# Patient Record
Sex: FEMALE | Race: WHITE | Employment: FULL TIME | URBAN - METROPOLITAN AREA
[De-identification: names, ages, dates, MRNs, and addresses within clinical notes are randomized per-mention and may not be internally consistent; named-entity substitution may affect disease eponyms.]

---

## 2017-01-05 ENCOUNTER — OFFICE VISIT (OUTPATIENT)
Dept: FAMILY MEDICINE | Facility: OTHER | Age: 57
End: 2017-01-05
Attending: FAMILY MEDICINE
Payer: MEDICARE

## 2017-01-05 VITALS
DIASTOLIC BLOOD PRESSURE: 68 MMHG | HEART RATE: 74 BPM | OXYGEN SATURATION: 93 % | WEIGHT: 285 LBS | RESPIRATION RATE: 16 BRPM | BODY MASS INDEX: 52.44 KG/M2 | SYSTOLIC BLOOD PRESSURE: 108 MMHG | HEIGHT: 62 IN | TEMPERATURE: 97.3 F

## 2017-01-05 DIAGNOSIS — Z11.59 NEED FOR HEPATITIS C SCREENING TEST: ICD-10-CM

## 2017-01-05 DIAGNOSIS — I10 BENIGN ESSENTIAL HYPERTENSION: Primary | ICD-10-CM

## 2017-01-05 DIAGNOSIS — Z12.31 ENCOUNTER FOR SCREENING MAMMOGRAM FOR BREAST CANCER: ICD-10-CM

## 2017-01-05 DIAGNOSIS — E78.5 HYPERLIPIDEMIA, UNSPECIFIED HYPERLIPIDEMIA TYPE: ICD-10-CM

## 2017-01-05 DIAGNOSIS — E66.01 MORBID OBESITY, UNSPECIFIED OBESITY TYPE (H): ICD-10-CM

## 2017-01-05 LAB
ALBUMIN SERPL-MCNC: 3.5 G/DL (ref 3.4–5)
ALP SERPL-CCNC: 134 U/L (ref 40–150)
ALT SERPL W P-5'-P-CCNC: 25 U/L (ref 0–50)
ANION GAP SERPL CALCULATED.3IONS-SCNC: 9 MMOL/L (ref 3–14)
AST SERPL W P-5'-P-CCNC: 17 U/L (ref 0–45)
BASOPHILS # BLD AUTO: 0 10E9/L (ref 0–0.2)
BASOPHILS NFR BLD AUTO: 0.3 %
BILIRUB SERPL-MCNC: 0.2 MG/DL (ref 0.2–1.3)
BUN SERPL-MCNC: 21 MG/DL (ref 7–30)
CALCIUM SERPL-MCNC: 8.5 MG/DL (ref 8.5–10.1)
CHLORIDE SERPL-SCNC: 107 MMOL/L (ref 94–109)
CHOLEST SERPL-MCNC: 164 MG/DL
CO2 SERPL-SCNC: 27 MMOL/L (ref 20–32)
CREAT SERPL-MCNC: 0.78 MG/DL (ref 0.52–1.04)
DIFFERENTIAL METHOD BLD: NORMAL
EOSINOPHIL # BLD AUTO: 0.3 10E9/L (ref 0–0.7)
EOSINOPHIL NFR BLD AUTO: 3 %
ERYTHROCYTE [DISTWIDTH] IN BLOOD BY AUTOMATED COUNT: 14.9 % (ref 10–15)
GFR SERPL CREATININE-BSD FRML MDRD: 76 ML/MIN/1.7M2
GLUCOSE SERPL-MCNC: 91 MG/DL (ref 70–99)
HCT VFR BLD AUTO: 38.9 % (ref 35–47)
HDLC SERPL-MCNC: 64 MG/DL
HGB BLD-MCNC: 12.5 G/DL (ref 11.7–15.7)
LDLC SERPL CALC-MCNC: 74 MG/DL
LYMPHOCYTES # BLD AUTO: 2.1 10E9/L (ref 0.8–5.3)
LYMPHOCYTES NFR BLD AUTO: 23.7 %
MCH RBC QN AUTO: 28.4 PG (ref 26.5–33)
MCHC RBC AUTO-ENTMCNC: 32.1 G/DL (ref 31.5–36.5)
MCV RBC AUTO: 88 FL (ref 78–100)
MONOCYTES # BLD AUTO: 0.6 10E9/L (ref 0–1.3)
MONOCYTES NFR BLD AUTO: 6.7 %
NEUTROPHILS # BLD AUTO: 5.8 10E9/L (ref 1.6–8.3)
NEUTROPHILS NFR BLD AUTO: 66.3 %
NONHDLC SERPL-MCNC: 100 MG/DL
PLATELET # BLD AUTO: 239 10E9/L (ref 150–450)
POTASSIUM SERPL-SCNC: 3.8 MMOL/L (ref 3.4–5.3)
PROT SERPL-MCNC: 7.5 G/DL (ref 6.8–8.8)
RBC # BLD AUTO: 4.4 10E12/L (ref 3.8–5.2)
SODIUM SERPL-SCNC: 143 MMOL/L (ref 133–144)
TRIGL SERPL-MCNC: 130 MG/DL
WBC # BLD AUTO: 8.7 10E9/L (ref 4–11)

## 2017-01-05 PROCEDURE — 80053 COMPREHEN METABOLIC PANEL: CPT | Performed by: FAMILY MEDICINE

## 2017-01-05 PROCEDURE — 86803 HEPATITIS C AB TEST: CPT | Performed by: FAMILY MEDICINE

## 2017-01-05 PROCEDURE — 85025 COMPLETE CBC W/AUTO DIFF WBC: CPT | Performed by: FAMILY MEDICINE

## 2017-01-05 PROCEDURE — 80061 LIPID PANEL: CPT | Performed by: FAMILY MEDICINE

## 2017-01-05 PROCEDURE — 99214 OFFICE O/P EST MOD 30 MIN: CPT | Performed by: FAMILY MEDICINE

## 2017-01-05 PROCEDURE — 36415 COLL VENOUS BLD VENIPUNCTURE: CPT | Performed by: FAMILY MEDICINE

## 2017-01-05 PROCEDURE — 99213 OFFICE O/P EST LOW 20 MIN: CPT

## 2017-01-05 RX ORDER — FUROSEMIDE 20 MG
40 TABLET ORAL DAILY
COMMUNITY
End: 2017-11-20

## 2017-01-05 ASSESSMENT — ANXIETY QUESTIONNAIRES
1. FEELING NERVOUS, ANXIOUS, OR ON EDGE: NOT AT ALL
5. BEING SO RESTLESS THAT IT IS HARD TO SIT STILL: NOT AT ALL
2. NOT BEING ABLE TO STOP OR CONTROL WORRYING: NOT AT ALL
7. FEELING AFRAID AS IF SOMETHING AWFUL MIGHT HAPPEN: NOT AT ALL
GAD7 TOTAL SCORE: 0
IF YOU CHECKED OFF ANY PROBLEMS ON THIS QUESTIONNAIRE, HOW DIFFICULT HAVE THESE PROBLEMS MADE IT FOR YOU TO DO YOUR WORK, TAKE CARE OF THINGS AT HOME, OR GET ALONG WITH OTHER PEOPLE: NOT DIFFICULT AT ALL
6. BECOMING EASILY ANNOYED OR IRRITABLE: NOT AT ALL
3. WORRYING TOO MUCH ABOUT DIFFERENT THINGS: NOT AT ALL

## 2017-01-05 ASSESSMENT — PAIN SCALES - GENERAL: PAINLEVEL: NO PAIN (0)

## 2017-01-05 ASSESSMENT — PATIENT HEALTH QUESTIONNAIRE - PHQ9: 5. POOR APPETITE OR OVEREATING: NOT AT ALL

## 2017-01-05 NOTE — MR AVS SNAPSHOT
After Visit Summary   1/5/2017    Maite Suero    MRN: [id number]           Patient Information     Date Of Birth          1960        Visit Information        Provider Department      1/5/2017 10:30 AM Yessi Dorsey MD [facility]        Today's Diagnoses     Benign essential hypertension    -  1     Hyperlipidemia, unspecified hyperlipidemia type         Morbid obesity, unspecified obesity type (H)         Need for hepatitis C screening test         Encounter for screening mammogram for breast cancer            Follow-ups after your visit        Additional Services     GENERAL SURG ADULT REFERRAL       Your provider has referred you to: [facility]    Please be aware that coverage of these services is subject to the terms and limitations of your health insurance plan.  Call member services at your health plan with any benefit or coverage questions.      Please bring the following with you to your appointment:    (1) Any X-Rays, CTs or MRIs which have been performed.  Contact the facility where they were done to arrange for  prior to your scheduled appointment.   (2) List of current medications   (3) This referral request   (4) Any documents/labs given to you for this referral                  Follow-up notes from your care team     Return in about 1 year (around 1/5/2018).      Your next 10 appointments already scheduled     Oct 17, 2017  1:00 PM   (Arrive by 12:45 PM)   Return Visit with Darling Osuna NP   [facility] ([facility])    [address] [place] [address]   [phone]              Who to contact     If you have questions or need follow up information about today's clinic visit or your schedule please contact [facility] directly at [phone].  Normal or non-critical lab and imaging results will be communicated to you by MyChart, letter or phone within 4 business days after the clinic has  "received the results. If you do not hear from us within 7 days, please contact the clinic through [employer] or phone. If you have a critical or abnormal lab result, we will notify you by phone as soon as possible.  Submit refill requests through [employer] or call your pharmacy and they will forward the refill request to us. Please allow 3 business days for your refill to be completed.          Additional Information About Your Visit        [employer] Information     [employer] lets you send messages to your doctor, view your test results, renew your prescriptions, schedule appointments and more. To sign up, go to [email or web address] . Click on \"Log in\" on the left side of the screen, which will take you to the Welcome page. Then click on \"Sign up Now\" on the right side of the page.     You will be asked to enter the access code listed below, as well as some personal information. Please follow the directions to create your username and password.     Your access code is: [email or web address]  Expires: 2017 12:22 PM     Your access code will  in 90 days. If you need help or a new code, please call your [facility] clinic or [phone].        Care EveryWhere ID     This is your Care EveryWhere ID. This could be used by other organizations to access your [facility] medical records  [id number]        Your Vitals Were     Pulse Temperature Respirations Height BMI (Body Mass Index) Pulse Oximetry    74 97.3  F (36.3  C) (Tympanic) 16 5' 2\" (1.575 m) 52.11 kg/m2 93%       Blood Pressure from Last 3 Encounters:   17 108/68   10/18/16 110/62   16 104/62    Weight from Last 3 Encounters:   17 285 lb (129.275 kg)   10/18/16 285 lb (129.275 kg)   16 281 lb (127.461 kg)              We Performed the Following     CBC with platelets and differential     Comprehensive metabolic panel     GENERAL SURG ADULT REFERRAL     Hepatitis C Screen Reflex to HCV RNA Quant and Genotype     Lipid Profile (Chol, Trig, HDL, " LDL calc)     MA Digital Screening Bilateral          Today's Medication Changes          These changes are accurate as of: 1/5/17  6:27 PM.  If you have any questions, ask your nurse or doctor.               These medicines have changed or have updated prescriptions.        Dose/Directions    LASIX 20 MG tablet   This may have changed:  Another medication with the same name was removed. Continue taking this medication, and follow the directions you see here.   Generic drug:  furosemide   Changed by:  Yessi Dorsey MD        Dose:  40 mg   Take 40 mg by mouth daily   Refills:  0                Primary Care Provider Office Phone # Fax #    Yessi Dorsey [phone]       [facility] [address] [place] [id number]        Thank you!     Thank you for choosing [facility]  for your care. Our goal is always to provide you with excellent care. Hearing back from our patients is one way we can continue to improve our services. Please take a few minutes to complete the written survey that you may receive in the mail after your visit with us. Thank you!             Your Updated Medication List - Protect others around you: Learn how to safely use, store and throw away your medicines at www.disposemymeds.org.          This list is accurate as of: 1/5/17  6:27 PM.  Always use your most recent med list.                   Brand Name Dispense Instructions for use    ABILIFY PO      Take 10 mg by mouth daily       aspirin 81 MG EC tablet   Generic drug:  aspirin      Take 81 mg by mouth 2 times daily       buPROPion 300 MG 24 hr tablet    WELLBUTRIN XL     Take by mouth every morning       COLACE PO      Take 100 mg by mouth daily       CRESTOR 20 MG tablet   Generic drug:  rosuvastatin     90 tablet    TAKE ONE TABLET BY MOUTH ONCE DAILY       LASIX 20 MG tablet   Generic drug:  furosemide      Take 40 mg by mouth daily       mirabegron 50 MG 24 hr tablet     MYRBETRIQ    30 tablet    Take 1 tablet (50 mg) by mouth daily       MULTIVITAMIN PO      Take 0.5 mg by mouth 2 times daily       NASONEX 50 MCG/ACT spray   Generic drug:  mometasone     17 g    USE TWO SPRAYS IN EACH NOSTRIL ONCE DAILY       omeprazole 20 MG CR capsule    priLOSEC    60 capsule    TAKE ONE CAPSULE BY MOUTH TWICE DAILY       PROBIOTIC DAILY Caps      Take by mouth daily       RITALIN LA 20 MG Cp24   Generic drug:  methylphenidate      Take 80 mg by mouth daily       ROZEREM 8 MG tablet   Generic drug:  ramelteon     30 tablet    TAKE ONE TABLET BY MOUTH AT BEDTIME       sertraline 100 MG tablet    ZOLOFT     Take 2 tablets by mouth daily

## 2017-01-05 NOTE — NURSING NOTE
"Chief Complaint   Patient presents with     Physical     needs mammogram ordered and breast exam today, has an order from DR LARSON for lab work     Recheck Medication     refills       Initial /68 mmHg  Pulse 74  Temp(Src) 97.3  F (36.3  C) (Tympanic)  Resp 16  Ht 5' 2\" (1.575 m)  Wt 285 lb (129.275 kg)  BMI 52.11 kg/m2  SpO2 93% Estimated body mass index is 52.11 kg/(m^2) as calculated from the following:    Height as of this encounter: 5' 2\" (1.575 m).    Weight as of this encounter: 285 lb (129.275 kg).  BP completed using cuff size: catarina Fletcher      "

## 2017-01-05 NOTE — Clinical Note
[facility]  [address]  [place] [address]  [phone]    January 10, 2017    Maite Suero  [address] [place] [address] [place] [id number]    Dear Maite,    Normal/negative results.     Results for orders placed or performed in visit on 01/05/17   Lipid Profile (Chol, Trig, HDL, LDL calc)   Result Value Ref Range    Cholesterol 164 <200 mg/dL    Triglycerides 130 <150 mg/dL    HDL Cholesterol 64 >49 mg/dL    LDL Cholesterol Calculated 74 <100 mg/dL    Non HDL Cholesterol 100 <130 mg/dL   Comprehensive metabolic panel   Result Value Ref Range    Sodium 143 133 - 144 mmol/L    Potassium 3.8 3.4 - 5.3 mmol/L    Chloride 107 94 - 109 mmol/L    Carbon Dioxide 27 20 - 32 mmol/L    Anion Gap 9 3 - 14 mmol/L    Glucose 91 70 - 99 mg/dL    Urea Nitrogen 21 7 - 30 mg/dL    Creatinine 0.78 0.52 - 1.04 mg/dL    GFR Estimate 76 >60 mL/min/1.7m2    GFR Estimate If Black >90   GFR Calc   >60 mL/min/1.7m2    Calcium 8.5 8.5 - 10.1 mg/dL    Bilirubin Total 0.2 0.2 - 1.3 mg/dL    Albumin 3.5 3.4 - 5.0 g/dL    Protein Total 7.5 6.8 - 8.8 g/dL    Alkaline Phosphatase 134 40 - 150 U/L    ALT 25 0 - 50 U/L    AST 17 0 - 45 U/L   CBC with platelets and differential   Result Value Ref Range    WBC 8.7 4.0 - 11.0 10e9/L    RBC Count 4.40 3.8 - 5.2 10e12/L    Hemoglobin 12.5 11.7 - 15.7 g/dL    Hematocrit 38.9 35.0 - 47.0 %    MCV 88 78 - 100 fl    MCH 28.4 26.5 - 33.0 pg    MCHC 32.1 31.5 - 36.5 g/dL    RDW 14.9 10.0 - 15.0 %    Platelet Count 239 150 - 450 10e9/L    Diff Method Automated Method     % Neutrophils 66.3 %    % Lymphocytes 23.7 %    % Monocytes 6.7 %    % Eosinophils 3.0 %    % Basophils 0.3 %    Absolute Neutrophil 5.8 1.6 - 8.3 10e9/L    Absolute Lymphocytes 2.1 0.8 - 5.3 10e9/L    Absolute Monocytes 0.6 0.0 - 1.3 10e9/L    Absolute Eosinophils 0.3 0.0 - 0.7 10e9/L    Absolute Basophils 0.0 0.0 - 0.2 10e9/L   Hepatitis C Screen Reflex to HCV RNA Quant and Genotype    Result Value Ref Range    Hepatitis C Antibody  NR     Nonreactive   Assay performance characteristics have not been established for newborns,   infants, and children         Sincerely,  DR FORBES

## 2017-01-06 ASSESSMENT — ANXIETY QUESTIONNAIRES: GAD7 TOTAL SCORE: 0

## 2017-01-06 ASSESSMENT — PATIENT HEALTH QUESTIONNAIRE - PHQ9: SUM OF ALL RESPONSES TO PHQ QUESTIONS 1-9: 7

## 2017-01-06 NOTE — PROGRESS NOTES
SUBJECTIVE:                                                    Maite Suero is a 56 year old female who presents to clinic today for the following health issues:    Hyperlipidemia Follow-Up      Rate your low fat/cholesterol diet?: good    Taking statin?  Yes, no muscle aches from statin    Other lipid medications/supplements?:  none     Hypertension Follow-up      Outpatient blood pressures are not being checked.    Low Salt Diet: no added salt       Amount of exercise or physical activity: trying to be active daily    Problems taking medications regularly: No    Medication side effects: none    Diet: low fat/cholesterol    Patient states she is frustrated with her weight.  She has tried to do all the right things.  She is ready to meet with Bariatrics.  She is also due for hepatitis C screening.  She is due for mammogram.    Problem list and histories reviewed & adjusted, as indicated.  Additional history: as documented    Patient Active Problem List   Diagnosis     Benign essential hypertension     Hyperlipidemia     Major depressive disorder, recurrent episode (H)     Ankle sprain     Weight gain     GERD (gastroesophageal reflux disease)     Low back pain     Cervicalgia (NECK)     Urinary, incontinence, stress female     Irritable bowel syndrome     Morbid obesity (H)     Dissociative identity disorder     Vitamin D deficiency     OAB (overactive bladder)     ACP (advance care planning)     Mixed stress and urge urinary incontinence     Past Surgical History   Procedure Laterality Date     Hysterectomy       fibroids     Gyn surgery       Cholecystectomy       Colonoscopy N/A 10/9/2014     Procedure: COLONOSCOPY;  Surgeon: Vandana Daniels MD;  Location: [facility] OR       Social History   Substance Use Topics     Smoking status: Never Smoker      Smokeless tobacco: Never Used     Alcohol Use: No     Family History   Problem Relation Age of Onset     Psychotic Disorder Mother      Psychotic Disorder Father   "    Psychotic Disorder Brother      Psychotic Disorder Sister          Current Outpatient Prescriptions   Medication Sig Dispense Refill     furosemide (LASIX) 20 MG tablet Take 40 mg by mouth daily       Docusate Sodium (COLACE PO) Take 100 mg by mouth daily       CRESTOR 20 MG tablet TAKE ONE TABLET BY MOUTH ONCE DAILY 90 tablet 0     NASONEX 50 MCG/ACT nasal spray USE TWO SPRAYS IN EACH NOSTRIL ONCE DAILY 17 g 5     mirabegron (MYRBETRIQ) 50 MG 24 hr tablet Take 1 tablet (50 mg) by mouth daily 30 tablet 11     methylphenidate (RITALIN LA) 20 MG CP24 Take 80 mg by mouth daily       omeprazole (PRILOSEC) 20 MG capsule TAKE ONE CAPSULE BY MOUTH TWICE DAILY 60 capsule 10     ROZEREM 8 MG tablet TAKE ONE TABLET BY MOUTH AT BEDTIME 30 tablet 5     Multiple Vitamins-Minerals (MULTIVITAMIN PO) Take 0.5 mg by mouth 2 times daily       ARIPiprazole (ABILIFY PO) Take 10 mg by mouth daily       aspirin (ASPIRIN) 81 MG EC tablet Take 81 mg by mouth 2 times daily        buPROPion (WELLBUTRIN XL) 300 MG 24 hr tablet Take by mouth every morning       sertraline (ZOLOFT) 100 MG tablet Take 2 tablets by mouth daily       Probiotic Product (PROBIOTIC DAILY) CAPS Take by mouth daily       Allergies   Allergen Reactions     Ambien [Zolpidem] Other (See Comments)     Pt states she was baking food in her sleep     Ivp Dye [Contrast Dye]      From 25 years ago, may be formulated differently now       ROS:  Constitutional, HEENT, cardiovascular, pulmonary, GI, , musculoskeletal, neuro, skin, endocrine and psych systems are negative, except as otherwise noted.    OBJECTIVE:                                                    /68 mmHg  Pulse 74  Temp(Src) 97.3  F (36.3  C) (Tympanic)  Resp 16  Ht 5' 2\" (1.575 m)  Wt 285 lb (129.275 kg)  BMI 52.11 kg/m2  SpO2 93%  Body mass index is 52.11 kg/(m^2).  GENERAL: healthy, alert and no distress  EYES: Eyes grossly normal to inspection, PERRL and conjunctivae and sclerae " normal  HENT: ear canals and TM's normal, nose and mouth without ulcers or lesions  NECK: no adenopathy  RESP: lungs clear to auscultation - no rales, rhonchi or wheezes  BREAST: normal without masses, tenderness or nipple discharge and no palpable axillary masses or adenopathy  CV: regular rate and rhythm, normal S1 S2, no S3 or S4, no murmur, click or rub, no peripheral edema and peripheral pulses strong  ABDOMEN: soft, nontender, no hepatosplenomegaly, no masses and bowel sounds normal  MS: no gross musculoskeletal defects noted, no edema  SKIN: no suspicious lesions or rashes  NEURO: Normal strength and tone, mentation intact and speech normal  PSYCH: mentation appears normal, affect normal/bright    Diagnostic Test Results:  none      ASSESSMENT/PLAN:                                                    Hyperlipidemia; controlled   Plan:  Labs:   Lipid and ALT    Hypertension; controlled   Associated with the following complications:    None   Plan:  Labs:   BMP        ICD-10-CM    1. Benign essential hypertension I10 Comprehensive metabolic panel     CBC with platelets and differential   2. Hyperlipidemia, unspecified hyperlipidemia type E78.5 Lipid Profile (Chol, Trig, HDL, LDL calc)     Comprehensive metabolic panel   3. Morbid obesity, unspecified obesity type (H) E66.01 GENERAL SURG ADULT REFERRAL   4. Need for hepatitis C screening test Z11.59 Hepatitis C Screen Reflex to HCV RNA Quant and Genotype   5. Encounter for screening mammogram for breast cancer Z12.31 MA Digital Screening Bilateral       CONSULTATION/REFERRAL to Bariatric Surgery at [facility].      Yessi Dorsey MD  [facility]

## 2017-01-09 LAB — HCV AB SERPL QL IA: NORMAL

## 2017-01-10 ENCOUNTER — TELEPHONE (OUTPATIENT)
Dept: FAMILY MEDICINE | Facility: OTHER | Age: 57
End: 2017-01-10

## 2017-01-10 DIAGNOSIS — S39.012A BACK STRAIN, INITIAL ENCOUNTER: Primary | ICD-10-CM

## 2017-02-01 DIAGNOSIS — E78.2 MIXED HYPERLIPIDEMIA: Primary | ICD-10-CM

## 2017-02-02 RX ORDER — ROSUVASTATIN CALCIUM 20 MG/1
TABLET, FILM COATED ORAL
Qty: 90 TABLET | Refills: 3 | Status: SHIPPED | OUTPATIENT
Start: 2017-02-02 | End: 2018-04-30

## 2017-02-02 NOTE — TELEPHONE ENCOUNTER
Crestor 20 mg    Last Written Prescription Date: 1-4-2017  Last Fill Quantity: 90, # refills: 0  Last Office Visit with [facility], P or [facility] prescribing provider: 1-5-2017       CHOL      164   1/5/2017  HDL       64   1/5/2017  LDL       74   1/5/2017  TRIG      130   1/5/2017  CHOLHDLRATIO      2.8   11/11/2015

## 2017-03-11 DIAGNOSIS — G47.00 INSOMNIA, UNSPECIFIED TYPE: Primary | ICD-10-CM

## 2017-03-11 DIAGNOSIS — G47.00 INSOMNIA: ICD-10-CM

## 2017-03-13 RX ORDER — RAMELTEON 8 MG/1
TABLET, FILM COATED ORAL
Qty: 30 TABLET | Refills: 5 | Status: SHIPPED | OUTPATIENT
Start: 2017-03-13 | End: 2017-11-28

## 2017-03-27 ENCOUNTER — HOSPITAL ENCOUNTER (EMERGENCY)
Facility: HOSPITAL | Age: 57
Discharge: HOME | End: 2017-03-27
Attending: EMERGENCY MEDICINE | Admitting: EMERGENCY MEDICINE
Payer: MEDICARE

## 2017-03-27 ENCOUNTER — TELEPHONE (OUTPATIENT)
Dept: FAMILY MEDICINE | Facility: OTHER | Age: 57
End: 2017-03-27

## 2017-03-27 VITALS
TEMPERATURE: 97.9 F | SYSTOLIC BLOOD PRESSURE: 119 MMHG | RESPIRATION RATE: 18 BRPM | OXYGEN SATURATION: 93 % | DIASTOLIC BLOOD PRESSURE: 69 MMHG | HEART RATE: 84 BPM

## 2017-03-27 DIAGNOSIS — R42 VERTIGO: ICD-10-CM

## 2017-03-27 PROCEDURE — 25000125 ZZHC RX 250: Performed by: EMERGENCY MEDICINE

## 2017-03-27 PROCEDURE — 99283 EMERGENCY DEPT VISIT LOW MDM: CPT

## 2017-03-27 PROCEDURE — 99283 EMERGENCY DEPT VISIT LOW MDM: CPT | Performed by: EMERGENCY MEDICINE

## 2017-03-27 PROCEDURE — 25900017 H RX MED GY IP 259 OP 259 PS 637: Mod: GY | Performed by: EMERGENCY MEDICINE

## 2017-03-27 PROCEDURE — A9270 NON-COVERED ITEM OR SERVICE: HCPCS | Mod: GY | Performed by: EMERGENCY MEDICINE

## 2017-03-27 RX ORDER — SCOLOPAMINE TRANSDERMAL SYSTEM 1 MG/1
PATCH, EXTENDED RELEASE TRANSDERMAL
Qty: 3 PATCH | Refills: 0 | Status: SHIPPED | OUTPATIENT
Start: 2017-03-27 | End: 2017-08-28

## 2017-03-27 RX ORDER — SCOLOPAMINE TRANSDERMAL SYSTEM 1 MG/1
1 PATCH, EXTENDED RELEASE TRANSDERMAL
Status: DISCONTINUED | OUTPATIENT
Start: 2017-03-27 | End: 2017-03-27 | Stop reason: HOSPADM

## 2017-03-27 RX ORDER — MECLIZINE HYDROCHLORIDE 25 MG/1
25 TABLET ORAL ONCE
Status: COMPLETED | OUTPATIENT
Start: 2017-03-27 | End: 2017-03-27

## 2017-03-27 RX ORDER — MECLIZINE HYDROCHLORIDE 25 MG/1
25 TABLET ORAL EVERY 6 HOURS PRN
Qty: 30 TABLET | Refills: 1 | Status: SHIPPED | OUTPATIENT
Start: 2017-03-27 | End: 2017-08-28

## 2017-03-27 RX ADMIN — SCOPALAMINE 1 PATCH: 1 PATCH, EXTENDED RELEASE TRANSDERMAL at 17:32

## 2017-03-27 RX ADMIN — MECLIZINE HYDROCHLORIDE 25 MG: 25 TABLET ORAL at 17:33

## 2017-03-27 ASSESSMENT — ENCOUNTER SYMPTOMS
FATIGUE: 1
ACTIVITY CHANGE: 1
APPETITE CHANGE: 1
WEAKNESS: 1
HEADACHES: 0
DIZZINESS: 1
LIGHT-HEADEDNESS: 1

## 2017-03-27 NOTE — TELEPHONE ENCOUNTER
12:21 PM    Reason for Call: OVERBOOK    Patient is having the following symptoms:  Extreme dizziness for  4 days    The patient is requesting an appointment for ASAP  with  Dr. Morin    Was an appointment offered for this call?  No    Preferred method for responding to this message: [phone]    If we cannot reach you directly, may we leave a detailed response at the number you provided?  Yes        Deysi Macias

## 2017-03-27 NOTE — ED PROVIDER NOTES
History     Chief Complaint   Patient presents with     Dizziness     Started a little on Friday night, had a little diarrhea on Saturday but no dizziness, then dizziness came back late Saturday night and Sunday. States was severe on Sunday but is actually better now.  has a history of these symptoms but it has been at least a year.  has taken meclizine in the past.      HPI  Maite Suero is a 56 year old female who presents with these symptoms.  She has had vertigo in the past and did not know that she could get meclizine OTC.  Since she could not get into see Dr. Campo today, she presents in our ER.  No headache or any neuro symptoms above the vertigo and nausea.     I have reviewed the Medications, Allergies, Past Medical and Surgical History, and Social History in the Epic system.    Review of Systems   Constitutional: Positive for activity change, appetite change and fatigue.   Neurological: Positive for dizziness, weakness and light-headedness. Negative for headaches.       Physical Exam   BP: 133/77  Temp: 97.9  F (36.6  C)  Resp: 18  SpO2: 94 %  Physical Exam   Constitutional: She is oriented to person, place, and time. She appears well-developed and well-nourished.   HENT:   Head: Normocephalic and atraumatic.   Eyes: Conjunctivae and EOM are normal. Pupils are equal, round, and reactive to light.   No nystagmus   Neck: Normal range of motion. Neck supple.   Cardiovascular: Normal rate.    Pulmonary/Chest: Effort normal.   Abdominal: Soft. She exhibits no distension. There is no tenderness. There is no rebound.   Musculoskeletal: Normal range of motion. She exhibits no edema.   Neurological: She is alert and oriented to person, place, and time.   Skin: Skin is warm.     ED Course     ED Course     Procedures  Critical Care time:  none    Assessments & Plan (with Medical Decision Making)   Maite has recurrent vertigo with nausea and requests only antivert since she was through this  same situation a few years prior.  To that end, a scoploamine patch was applied and she received antivert 25mg and was observed and felt better.  Will f/u with her FP [facility] if this does not help.   I have reviewed the nursing notes.    I have reviewed the findings, diagnosis, plan and need for follow up with the patient.    New Prescriptions    MECLIZINE (ANTIVERT) 25 MG TABLET    Take 1 tablet (25 mg) by mouth every 6 hours as needed for dizziness    SCOPOLAMINE (TRANSDERM) 72 HR PATCH    Apply 1 patch to hairless area behind one ear at least 4 hours before travel.  Remove old patch and change every 3 days (72 hours).       Final diagnoses:   Vertigo       3/27/2017   [facility]     Franky Louis MD  03/27/17 2126

## 2017-03-27 NOTE — ED AVS SNAPSHOT
[facility]    [address]    [place] [address]    Phone:  [phone]                                       Maite uSero   MRN: [id number]    Department:  [facility]   Date of Visit:  3/27/2017           After Visit Summary Signature Page     I have received my discharge instructions, and my questions have been answered. I have discussed any challenges I see with this plan with the nurse or doctor.    ..........................................................................................................................................  Patient/Patient Representative Signature      ..........................................................................................................................................  Patient Representative Print Name and Relationship to Patient    ..................................................               ................................................  Date                                            Time    ..........................................................................................................................................  Reviewed by Signature/Title    ...................................................              ..............................................  Date                                                            Time

## 2017-03-27 NOTE — DISCHARGE INSTRUCTIONS
Managing Dizziness (Vertigo) with Medications  Although medications can't cure your problem, they can help control symptoms. Your doctor may prescribe medications for a few weeks and then taper them off.  If you have side effects from your medications, contact your healthcare provider right away.   How medications can help      Treat infection or inflammation. If you have a bacterial infection, your doctor can prescribe antibiotics.    Limit conflicting balance signals. These medications are often in pill form.    Ease nausea. Suppositories, pills, or shots may be used to reduce vomiting.    Reduce pressure in the canals. Diuretics can be used to treat Meniere's disease. These medications help rid the body of excess fluid.    Ease other symptoms. Other medications can help ease depression and anxiety caused by living with dizziness or fainting.    2865-3240 [employer] [address] [place] [address], [place] [address]. All rights reserved. This information is not intended as a substitute for professional medical care. Always follow your healthcare professional's instructions.          Understanding Dizziness, Balance Problems, and Fainting  Balance is a group effort of the eyes, inner ear, joints, and muscles. They each send signals to the brain about body position and head movement. Then the brain uses this information to achieve balance. When the brain receives conflicting signals, or when there is a problem with blood flow, dizziness or fainting can happen.    Vertigo  Vertigo is the feeling of spinning. It may happen if the brain receives conflicting balance signals. Vertigo is often caused by a problem in the inner ear. Problems include changes in inner ear structures, infection, swelling, or excess fluid. Sometimes vertigo is due to a brain problem, such as migraine.   Dysequilibrium  Dysequilibrium is the feeling of imbalance without a sense of spinning. It may happen if the signal path between the  body and brain is disrupted. There are many causes of dysequilibrium, including diabetes, anemia, head injury, and aging.  Syncope  Syncope is losing consciousness or fainting. The brain needs oxygen-rich blood to function. The heart pumps that blood to the brain. If there is a problem with the heart, blood flow (such as low blood pressure), or blood vessels, faintness may happen.    6405-8219 [employer]. [address] [place] [address], [place] [address]. All rights reserved. This information is not intended as a substitute for professional medical care. Always follow your healthcare professional's instructions.      Dyanaauluis,   You feel that this episode of vertigo is reminiscent of your previous episode and would simply like some meds.  The patch is replaced every three days as a preventative and the antivert can be used for breakthroughs.  If the symptoms have not cleared in 10 days then check in with Dr. Campo for further evaluation.

## 2017-03-27 NOTE — ED AVS SNAPSHOT
[facility]    [address] [address]    Phone:  [phone]                                       Maite Suero   MRN: [id number]    Department:  [facility]   Date of Visit:  3/27/2017           Patient Information     Date Of Birth          1960        Your diagnoses for this visit were:     Vertigo        You were seen by Franky Louis MD.      Follow-up Information     Follow up with Yessi Dorsey MD.    Specialty:  Family Practice    Why:  As needed    Contact information:    [facility]  [address] [address]  [phone]          Discharge Instructions         Managing Dizziness (Vertigo) with Medications  Although medications can't cure your problem, they can help control symptoms. Your doctor may prescribe medications for a few weeks and then taper them off.  If you have side effects from your medications, contact your healthcare provider right away.   How medications can help      Treat infection or inflammation. If you have a bacterial infection, your doctor can prescribe antibiotics.    Limit conflicting balance signals. These medications are often in pill form.    Ease nausea. Suppositories, pills, or shots may be used to reduce vomiting.    Reduce pressure in the canals. Diuretics can be used to treat Meniere's disease. These medications help rid the body of excess fluid.    Ease other symptoms. Other medications can help ease depression and anxiety caused by living with dizziness or fainting.    6243-4096 [employer]. [address] [place] [address]. All rights reserved. This information is not intended as a substitute for professional medical care. Always follow your healthcare professional's instructions.          Understanding Dizziness, Balance Problems, and Fainting  Balance is a group effort of the eyes, inner ear, joints, and muscles. They each send signals to the  brain about body position and head movement. Then the brain uses this information to achieve balance. When the brain receives conflicting signals, or when there is a problem with blood flow, dizziness or fainting can happen.    Vertigo  Vertigo is the feeling of spinning. It may happen if the brain receives conflicting balance signals. Vertigo is often caused by a problem in the inner ear. Problems include changes in inner ear structures, infection, swelling, or excess fluid. Sometimes vertigo is due to a brain problem, such as migraine.   Dysequilibrium  Dysequilibrium is the feeling of imbalance without a sense of spinning. It may happen if the signal path between the body and brain is disrupted. There are many causes of dysequilibrium, including diabetes, anemia, head injury, and aging.  Syncope  Syncope is losing consciousness or fainting. The brain needs oxygen-rich blood to function. The heart pumps that blood to the brain. If there is a problem with the heart, blood flow (such as low blood pressure), or blood vessels, faintness may happen.    0553-1233 [employer]. [address] [place] [address]. All rights reserved. This information is not intended as a substitute for professional medical care. Always follow your healthcare professional's instructions.      Melaura,   You feel that this episode of vertigo is reminiscent of your previous episode and would simply like some meds.  The patch is replaced every three days as a preventative and the antivert can be used for breakthroughs.  If the symptoms have not cleared in 10 days then check in with Dr. Campo for further evaluation.     Future Appointments        Provider Department Dept Phone Center    10/17/2017 1:00 PM Darling Ham NP [facility] [phone] Thelma Art         Review of your medicines      START taking        Dose / Directions Last dose taken    meclizine 25 MG tablet   Commonly known as:   ANTIVERT   Dose:  25 mg   Quantity:  30 tablet        Take 1 tablet (25 mg) by mouth every 6 hours as needed for dizziness   Refills:  1        scopolamine 72 hr patch   Commonly known as:  TRANSDERM   Quantity:  3 patch        Apply 1 patch to hairless area behind one ear at least 4 hours before travel.  Remove old patch and change every 3 days (72 hours).   Refills:  0          Our records show that you are taking the medicines listed below. If these are incorrect, please call your family doctor or clinic.        Dose / Directions Last dose taken    ABILIFY PO   Dose:  10 mg        Take 10 mg by mouth daily   Refills:  0        aspirin 81 MG EC tablet   Dose:  81 mg   Generic drug:  aspirin        Take 81 mg by mouth 2 times daily   Refills:  0        buPROPion 300 MG 24 hr tablet   Commonly known as:  WELLBUTRIN XL        Take by mouth every morning   Refills:  0        COLACE PO   Dose:  100 mg        Take 100 mg by mouth daily   Refills:  0        CRESTOR 20 MG tablet   Quantity:  90 tablet   Generic drug:  rosuvastatin        TAKE ONE TABLET BY MOUTH ONCE DAILY   Refills:  3        LASIX 20 MG tablet   Dose:  40 mg   Generic drug:  furosemide        Take 40 mg by mouth daily   Refills:  0        mirabegron 50 MG 24 hr tablet   Commonly known as:  MYRBETRIQ   Dose:  50 mg   Quantity:  30 tablet        Take 1 tablet (50 mg) by mouth daily   Refills:  11        MULTIVITAMIN PO   Dose:  0.5 mg        Take 0.5 mg by mouth 2 times daily   Refills:  0        NASONEX 50 MCG/ACT spray   Quantity:  17 g   Generic drug:  mometasone        USE TWO SPRAYS IN EACH NOSTRIL ONCE DAILY   Refills:  5        omeprazole 20 MG CR capsule   Commonly known as:  priLOSEC   Quantity:  60 capsule        TAKE ONE CAPSULE BY MOUTH TWICE DAILY   Refills:  10        PROBIOTIC DAILY Caps        Take by mouth daily   Refills:  0        RITALIN LA 20 MG Cp24   Dose:  80 mg   Generic drug:  methylphenidate        Take 80 mg by mouth daily  "  Refills:  0        ROZEREM 8 MG tablet   Quantity:  30 tablet   Generic drug:  ramelteon        TAKE ONE TABLET BY MOUTH AT BEDTIME   Refills:  5        sertraline 100 MG tablet   Commonly known as:  ZOLOFT   Dose:  2 tablet        Take 2 tablets by mouth daily   Refills:  0        tiZANidine 4 MG tablet   Commonly known as:  ZANAFLEX   Quantity:  30 tablet        TAKE ONE TABLET BY MOUTH THREE TIMES DAILY AS NEEDED   Refills:  0                Prescriptions were sent or printed at these locations (2 Prescriptions)                   [facility] [address] [place] [address] [address]    [address] [facility] [address]    Telephone:  [phone]   Fax:  [phone]   Hours:                  Printed at Department/Unit printer (2 of 2)         scopolamine (TRANSDERM) 72 hr patch               meclizine (ANTIVERT) 25 MG tablet                Orders Needing Specimen Collection     None      Pending Results     No orders found from 3/25/2017 to 3/28/2017.            Pending Culture Results     No orders found from 3/25/2017 to 3/28/2017.            Thank you for choosing [facility]       Thank you for choosing [facility] for your care. Our goal is always to provide you with excellent care. Hearing back from our patients is one way we can continue to improve our services. Please take a few minutes to complete the written survey that you may receive in the mail after you visit with us. Thank you!        [employer] Information     [employer] lets you send messages to your doctor, view your test results, renew your prescriptions, schedule appointments and more. To sign up, go to [email or web address] . Click on \"Log in\" on the left side of the screen, which will take you to the Welcome page. Then click on \"Sign up Now\" on the right side of the page.     You will be asked to enter the access code listed below, as well as some personal information. Please follow the directions to create your username and " password.     Your access code is: [email or web address]  Expires: 2017  6:10 PM     Your access code will  in 90 days. If you need help or a new code, please call your [facility] clinic or [phone].        Care EveryWhere ID     This is your Care EveryWhere ID. This could be used by other organizations to access your [facility] medical records  [id number]        After Visit Summary       This is your record. Keep this with you and show to your community pharmacist(s) and doctor(s) at your next visit.

## 2017-04-26 DIAGNOSIS — N39.46 MIXED INCONTINENCE URGE AND STRESS (MALE)(FEMALE): ICD-10-CM

## 2017-04-26 DIAGNOSIS — N39.46 MIXED STRESS AND URGE URINARY INCONTINENCE: Primary | ICD-10-CM

## 2017-04-26 DIAGNOSIS — N32.81 OAB (OVERACTIVE BLADDER): ICD-10-CM

## 2017-04-26 RX ORDER — MIRABEGRON 50 MG/1
50 TABLET, EXTENDED RELEASE ORAL DAILY
Qty: 90 TABLET | Refills: 1 | Status: SHIPPED | OUTPATIENT
Start: 2017-04-26 | End: 2017-10-17

## 2017-04-26 NOTE — PROGRESS NOTES
I received a message from [employer].  Patient knox save money with 90 day prescriptions, so I sent in the 90 day script.  Darling Osuna  CNP, CWOCN  Urology and Wound Care

## 2017-06-29 DIAGNOSIS — J31.0 CHRONIC RHINITIS: ICD-10-CM

## 2017-06-29 DIAGNOSIS — K21.9 GASTROESOPHAGEAL REFLUX DISEASE WITHOUT ESOPHAGITIS: ICD-10-CM

## 2017-06-30 RX ORDER — IBUPROFEN 600 MG/1
TABLET ORAL
Qty: 17 G | Refills: 6 | Status: SHIPPED | OUTPATIENT
Start: 2017-06-30 | End: 2018-03-05

## 2017-06-30 NOTE — TELEPHONE ENCOUNTER
Nasonex      Last Written Prescription Date: 11/1/2016  Last Fill Quantity: 17g,  # refills: 5   Last Office Visit with [facility] or [facility] prescribing provider: 1/5/2017                                             Prilosec      Last Written Prescription Date: 7/1/2016  Last Fill Quantity: 60,  # refills: 10   Last Office Visit with [facility] or [facility] prescribing provider: 1/5/2017

## 2017-08-24 ENCOUNTER — TELEPHONE (OUTPATIENT)
Dept: FAMILY MEDICINE | Facility: OTHER | Age: 57
End: 2017-08-24

## 2017-08-24 NOTE — TELEPHONE ENCOUNTER
8:11 AM    Reason for Call: Phone Call    Description: Still dizzy even with taking her methezene     Was an appointment offered for this call? No  If yes : Appointment type              Date    Preferred method for responding to this message: Telephone Call wondering what to do  What is your phone number ?    If we cannot reach you directly, may we leave a detailed response at the number you provided? Yes    Can this message wait until your PCP/provider returns, if available today? Tracee Sarah

## 2017-08-24 NOTE — TELEPHONE ENCOUNTER
Pt is scheduled 9/11. Stated that she is dizzy now and asked what to do in the mean-time? I advised that she could go to Urgent Care if she didn't want to wait until her appt date.

## 2017-08-25 ENCOUNTER — TELEPHONE (OUTPATIENT)
Dept: FAMILY MEDICINE | Facility: OTHER | Age: 57
End: 2017-08-25

## 2017-08-25 DIAGNOSIS — R42 DIZZINESS: Primary | ICD-10-CM

## 2017-08-25 NOTE — TELEPHONE ENCOUNTER
She should see ENT, is she willing to go to referral appointment, it may be in [place] to be seen sooner?

## 2017-08-25 NOTE — TELEPHONE ENCOUNTER
She is willing to go to [place] but does not trust herself to Drive that far, could get a ride on Tuesdays or Thursday if clinic days are then sivakumar would need more local appointment

## 2017-08-25 NOTE — TELEPHONE ENCOUNTER
8:00 AM    Reason for Call: Phone Call    Description: Maite called and she is still dizzy even when taking the patch and other meds. Appt set for 09/08/18.   Doesn't know if she can go that long.  Please advise  Was an appointment offered for this call? Yes  If yes : Appointment type  Short              Date  09/08/2017  Preferred method for responding to this message: Telephone Call  What is your phone number ?  [phone]    If we cannot reach you directly, may we leave a detailed response at the number you provided? Yes    Can this message wait until your PCP/provider returns, if available today? Not applicable,     Shantell Mane

## 2017-08-28 ENCOUNTER — TELEPHONE (OUTPATIENT)
Dept: FAMILY MEDICINE | Facility: OTHER | Age: 57
End: 2017-08-28

## 2017-08-28 DIAGNOSIS — R42 DIZZINESS: Primary | ICD-10-CM

## 2017-08-28 RX ORDER — SCOLOPAMINE TRANSDERMAL SYSTEM 1 MG/1
PATCH, EXTENDED RELEASE TRANSDERMAL
Qty: 4 PATCH | Refills: 0 | Status: SHIPPED | OUTPATIENT
Start: 2017-08-28 | End: 2017-08-28

## 2017-08-28 RX ORDER — MECLIZINE HYDROCHLORIDE 25 MG/1
25 TABLET ORAL EVERY 6 HOURS PRN
Qty: 30 TABLET | Refills: 0 | Status: SHIPPED | OUTPATIENT
Start: 2017-08-28 | End: 2017-08-28

## 2017-08-28 RX ORDER — MECLIZINE HYDROCHLORIDE 25 MG/1
25 TABLET ORAL EVERY 6 HOURS PRN
Qty: 30 TABLET | Refills: 0 | Status: SHIPPED | OUTPATIENT
Start: 2017-08-28 | End: 2017-09-12

## 2017-08-28 RX ORDER — SCOLOPAMINE TRANSDERMAL SYSTEM 1 MG/1
PATCH, EXTENDED RELEASE TRANSDERMAL
Qty: 4 PATCH | Refills: 0 | Status: SHIPPED | OUTPATIENT
Start: 2017-08-28 | End: 2017-09-08

## 2017-08-28 NOTE — TELEPHONE ENCOUNTER
Call placed to [facility] pharmacy and informed pharmacy staff that Rx's that were sent by Dr. Dorsey today were sent to [facility] in [place] and that Rx should have gone to [facility].  Pharmacy staff states that Rx was already transferred to [facility] earlier.

## 2017-08-28 NOTE — TELEPHONE ENCOUNTER
Attempted to call [facility] to inform to cancel Rx's.  [facility] answering machine states it is closed until 2pm.    Call placed to patient to inform that script was sent to [facility].  Patient states understanding and has no further questions or concerns at this time.

## 2017-08-28 NOTE — TELEPHONE ENCOUNTER
12:29 PM    Reason for Call: Phone Call    Description:  Patient would like refill of meclizine and patches for her dizziness sent to [facility].    Was an appointment offered for this call? No - patient has upcoming appt on 09/08  If yes : Appointment type               Date    Preferred method for responding to this message: Telephone Call  What is your phone number ?    If we cannot reach you directly, may we leave a detailed response at the number you provided? Yes    Can this message wait until your PCP/provider returns, if available today? Not applicable,     Genie Jones

## 2017-09-08 ENCOUNTER — OFFICE VISIT (OUTPATIENT)
Dept: FAMILY MEDICINE | Facility: OTHER | Age: 57
End: 2017-09-08
Attending: FAMILY MEDICINE
Payer: MEDICARE

## 2017-09-08 VITALS
WEIGHT: 275 LBS | DIASTOLIC BLOOD PRESSURE: 72 MMHG | TEMPERATURE: 98 F | BODY MASS INDEX: 50.61 KG/M2 | RESPIRATION RATE: 12 BRPM | OXYGEN SATURATION: 93 % | SYSTOLIC BLOOD PRESSURE: 114 MMHG | HEIGHT: 62 IN | HEART RATE: 90 BPM

## 2017-09-08 DIAGNOSIS — R42 DIZZINESS: ICD-10-CM

## 2017-09-08 DIAGNOSIS — R42 VERTIGO: Primary | ICD-10-CM

## 2017-09-08 DIAGNOSIS — F33.1 MODERATE EPISODE OF RECURRENT MAJOR DEPRESSIVE DISORDER (H): ICD-10-CM

## 2017-09-08 PROCEDURE — 99212 OFFICE O/P EST SF 10 MIN: CPT

## 2017-09-08 PROCEDURE — 99214 OFFICE O/P EST MOD 30 MIN: CPT | Performed by: FAMILY MEDICINE

## 2017-09-08 ASSESSMENT — ANXIETY QUESTIONNAIRES
7. FEELING AFRAID AS IF SOMETHING AWFUL MIGHT HAPPEN: SEVERAL DAYS
6. BECOMING EASILY ANNOYED OR IRRITABLE: NOT AT ALL
IF YOU CHECKED OFF ANY PROBLEMS ON THIS QUESTIONNAIRE, HOW DIFFICULT HAVE THESE PROBLEMS MADE IT FOR YOU TO DO YOUR WORK, TAKE CARE OF THINGS AT HOME, OR GET ALONG WITH OTHER PEOPLE: NOT DIFFICULT AT ALL
5. BEING SO RESTLESS THAT IT IS HARD TO SIT STILL: NOT AT ALL
3. WORRYING TOO MUCH ABOUT DIFFERENT THINGS: NOT AT ALL
1. FEELING NERVOUS, ANXIOUS, OR ON EDGE: SEVERAL DAYS
2. NOT BEING ABLE TO STOP OR CONTROL WORRYING: NOT AT ALL
GAD7 TOTAL SCORE: 2

## 2017-09-08 ASSESSMENT — PATIENT HEALTH QUESTIONNAIRE - PHQ9
5. POOR APPETITE OR OVEREATING: NOT AT ALL
SUM OF ALL RESPONSES TO PHQ QUESTIONS 1-9: 1

## 2017-09-08 NOTE — PROGRESS NOTES
Chief Complaint   Patient presents with     Dizziness     Depression     Screen due         SUBJECTIVE:   Maite Suero is a 57 year old female who presents to clinic today for the following health issues:      Depression and Anxiety Follow-Up    Status since last visit: Improved patient reports    Other associated symptoms:None    Complicating factors:     Significant life event: Recent move    Current substance abuse: None    PHQ-9 SCORE 1/5/2017 9/8/2017   Total Score 7 1     IVONNE-7 SCORE 1/5/2017 9/8/2017   Total Score 0 2       PHQ-9  English  PHQ-9   Any Language  GAD7      Amount of exercise or physical activity: 6-7 days/week on her legs    Problems taking medications regularly: No    Medication side effects: none  Diet: regular (no restrictions)      Dizziness  Onset: 2-3 weeks    Description:   Do you feel faint:  YES  Does it feel like the surroundings (bed, room) are moving: YES  Unsteady/off balance: YES  Have you passed out or fallen: YES    Intensity: severe    Progression of Symptoms:  worsening    Accompanying Signs & Symptoms:  Heart palpitations: no   Nausea, vomiting: YES  Weakness in arms or legs: no   Fatigue: YES  Vision or speech changes: YES- vision has gotten worse.  Ringing in ears (Tinnitus): no   Hearing Loss: no     History:   Head trauma/concussion hx: YES- At 5 & 7 years of age  Previous similar symptoms: YES- but not as severe  Recent bleeding history: no     Precipitating factors:   Worse with activity or head movement: YES  Any new medications (BP?): YES- lamictal and the extra 150 mg of wellbutron  Alcohol/drug abuse/withdrawal: no     Alleviating factors:   Does staying in a fixed position give relief:  YES    Therapies Tried and outcome: Meclizine and scopolamine patch. Patient is still dizzy.        Patient states she was never contacted to schedule mammogram.      Problem list and histories reviewed & adjusted, as indicated.  Additional history: as documented    Patient  Active Problem List   Diagnosis     Benign essential hypertension     Hyperlipidemia     Major depressive disorder, recurrent episode (H)     Ankle sprain     Weight gain     GERD (gastroesophageal reflux disease)     Low back pain     Cervicalgia (NECK)     Urinary, incontinence, stress female     Irritable bowel syndrome     Morbid obesity (H)     Dissociative identity disorder     Vitamin D deficiency     OAB (overactive bladder)     ACP (advance care planning)     Mixed stress and urge urinary incontinence     Past Surgical History:   Procedure Laterality Date     cholecystectomy       COLONOSCOPY N/A 10/9/2014    Procedure: COLONOSCOPY;  Surgeon: Vandana Daniels MD;  Location: [facility]     GYN SURGERY       HYSTERECTOMY      fibroids       Social History   Substance Use Topics     Smoking status: Never Smoker     Smokeless tobacco: Never Used     Alcohol use No     Family History   Problem Relation Age of Onset     Psychotic Disorder Mother      Psychotic Disorder Father      Psychotic Disorder Brother      Psychotic Disorder Sister          Current Outpatient Prescriptions   Medication Sig Dispense Refill     BuPROPion HCl (WELLBUTRIN PO) Take 150 mg by mouth daily (with breakfast)       LamoTRIgine (LAMICTAL PO)        scopolamine (TRANSDERM) 72 hr patch Apply 1 patch to hairless area behind one ear at least 4 hours before travel.  Remove old patch and change every 3 days (72 hours). 4 patch 0     meclizine (ANTIVERT) 25 MG tablet Take 1 tablet (25 mg) by mouth every 6 hours as needed for dizziness 30 tablet 0     NASONEX 50 MCG/ACT spray USE TWO SPRAYS IN EACH NOSTRIL ONCE DAILY 17 g 6     omeprazole (PRILOSEC) 20 MG CR capsule TAKE ONE CAPSULE BY MOUTH TWICE DAILY (Patient taking differently: 2 every morning) 60 capsule 6     mirabegron (MYRBETRIQ) 50 MG 24 hr tablet Take 1 tablet (50 mg) by mouth daily 90 tablet 1     ROZEREM 8 MG tablet TAKE ONE TABLET BY MOUTH AT BEDTIME 30 tablet 5     CRESTOR 20 MG  "tablet TAKE ONE TABLET BY MOUTH ONCE DAILY 90 tablet 3     tiZANidine (ZANAFLEX) 4 MG tablet TAKE ONE TABLET BY MOUTH THREE TIMES DAILY AS NEEDED 30 tablet 0     furosemide (LASIX) 20 MG tablet Take 40 mg by mouth daily       Docusate Sodium (COLACE PO) Take 100 mg by mouth daily       NASONEX 50 MCG/ACT nasal spray USE TWO SPRAYS IN EACH NOSTRIL ONCE DAILY 17 g 5     methylphenidate (RITALIN LA) 20 MG CP24 Take 80 mg by mouth daily       Multiple Vitamins-Minerals (MULTIVITAMIN PO) Take 0.5 mg by mouth 2 times daily       ARIPiprazole (ABILIFY PO) Take 10 mg by mouth daily       Probiotic Product (PROBIOTIC DAILY) CAPS Take by mouth daily       aspirin (ASPIRIN) 81 MG EC tablet Take 81 mg by mouth 2 times daily        buPROPion (WELLBUTRIN XL) 300 MG 24 hr tablet Take by mouth every morning       sertraline (ZOLOFT) 100 MG tablet Take 2 tablets by mouth daily       Allergies   Allergen Reactions     Ambien [Zolpidem] Other (See Comments)     Pt states she was baking food in her sleep     Ivp Dye [Contrast Dye]      From 25 years ago, may be formulated differently now         Reviewed and updated as needed this visit by clinical staffTobacco  Allergies  Meds       Reviewed and updated as needed this visit by Provider         ROS:  Constitutional, HEENT, cardiovascular, pulmonary, gi and gu systems are negative, except as otherwise noted.      OBJECTIVE:   /72 (BP Location: Left arm, Patient Position: Sitting, Cuff Size: Adult Large)  Pulse 90  Temp 98  F (36.7  C) (Tympanic)  Resp 12  Ht 5' 2\" (1.575 m)  Wt 275 lb (124.7 kg)  SpO2 93%  BMI 50.3 kg/m2  Body mass index is 50.3 kg/(m^2).  GENERAL: healthy, alert and no distress  EYES: Eyes grossly normal to inspection, PERRL and conjunctivae and sclerae normal  HENT: ear canals and TM's normal, nose and mouth without ulcers or lesions  PSYCH: mentation appears normal, affect normal/bright    Diagnostic Test Results:  none     ASSESSMENT/PLAN:     1. " Vertigo  Will refer to ENT.  Patient requests Dr. Peterson.  - OTOLARYNGOLOGY REFERRAL    2. Moderate episode of recurrent major depressive disorder (H)  Patient continues to work with Psych.  - DEPRESSION ACTION PLAN (DAP)        Yessi Dorsey MD  [facility]

## 2017-09-08 NOTE — PATIENT INSTRUCTIONS
My Depression Action Plan  Name: Maite Suero   Date of Birth 1960  Date: 9/8/2017    My doctor: Yessi Dorsey   My clinic: [facility]  [address] [place] [address] [address]  [place] [address]  [phone]          GREEN    ZONE   Good Control    What it looks like:     Things are going generally well. You have normal up s and down s. You may even feel depressed from time to time, but bad moods usually last less than a day.   What you need to do:  1. Continue to care for yourself (see self care plan)  2. Check your depression survival kit and update it as needed  3. Follow your physician s recommendations including any medication.  4. Do not stop taking medication unless you consult with your physician first.           YELLOW         ZONE Getting Worse    What it looks like:     Depression is starting to interfere with your life.     It may be hard to get out of bed; you may be starting to isolate yourself from others.    Symptoms of depression are starting to last most all day and this has happened for several days.     You may have suicidal thoughts but they are not constant.   What you need to do:     1. Call your care team, your response to treatment will improve if you keep your care team informed of your progress. Yellow periods are signs an adjustment may need to be made.     2. Continue your self-care, even if you have to fake it!    3. Talk to someone in your support network    4. Open up your depression survival kit           RED    ZONE Medical Alert - Get Help    What it looks like:     Depression is seriously interfering with your life.     You may experience these or other symptoms: You can t get out of bed most days, can t work or engage in other necessary activities, you have trouble taking care of basic hygiene, or basic responsibilities, thoughts of suicide or death that will not go away, self-injurious behavior.     What you need to do:  1. Call your care team  and request a same-day appointment. If they are not available (weekends or after hours) call your local crisis line, emergency room or 911.      Electronically signed by: Lakshmi Gusman, September 8, 2017    Depression Self Care Plan / Survival Kit    Self-Care for Depression  Here s the deal. Your body and mind are really not as separate as most people think.  What you do and think affects how you feel and how you feel influences what you do and think. This means if you do things that people who feel good do, it will help you feel better.  Sometimes this is all it takes.  There is also a place for medication and therapy depending on how severe your depression is, so be sure to consult with your medical provider and/ or Behavioral Health Consultant if your symptoms are worsening or not improving.     In order to better manage my stress, I will:    Exercise  Get some form of exercise, every day. This will help reduce pain and release endorphins, the  feel good  chemicals in your brain. This is almost as good as taking antidepressants!  This is not the same as joining a gym and then never going! (they count on that by the way ) It can be as simple as just going for a walk or doing some gardening, anything that will get you moving.      Hygiene   Maintain good hygiene (Get out of bed in the morning, Make your bed, Brush your teeth, Take a shower, and Get dressed like you were going to work, even if you are unemployed).  If your clothes don't fit try to get ones that do.    Diet  I will strive to eat foods that are good for me, drink plenty of water, and avoid excessive sugar, caffeine, alcohol, and other mood-altering substances.  Some foods that are helpful in depression are: complex carbohydrates, B vitamins, flaxseed, fish or fish oil, fresh fruits and vegetables.    Psychotherapy  I agree to participate in Individual Therapy (if recommended).    Medication  If prescribed medications, I agree to take them.  Missing  doses can result in serious side effects.  I understand that drinking alcohol, or other illicit drug use, may cause potential side effects.  I will not stop my medication abruptly without first discussing it with my provider.    Staying Connected With Others  I will stay in touch with my friends, family members, and my primary care provider/team.    Use your imagination  Be creative.  We all have a creative side; it doesn t matter if it s oil painting, sand castles, or mud pies! This will also kick up the endorphins.    Witness Beauty  (AKA stop and smell the roses) Take a look outside, even in mid-winter. Notice colors, textures. Watch the squirrels and birds.     Service to others  Be of service to others.  There is always someone else in need.  By helping others we can  get out of ourselves  and remember the really important things.  This also provides opportunities for practicing all the other parts of the program.    Humor  Laugh and be silly!  Adjust your TV habits for less news and crime-drama and more comedy.    Control your stress  Try breathing deep, massage therapy, biofeedback, and meditation. Find time to relax each day.     My support system    Clinic Contact:  Phone number:    Contact 1:  Phone number:    Contact 2:  Phone number:    S[employer]ian[employer]/:  Phone number:    Therapist:  Phone number:    Local crisis center:    Phone number:    Other community support:  Phone number:

## 2017-09-08 NOTE — MR AVS SNAPSHOT
After Visit Summary   9/8/2017    Maite Suero    MRN: [id number]           Patient Information     Date Of Birth          1960        Visit Information        Provider Department      9/8/2017 2:30 PM Yessi Dorsey MD [facility]        Today's Diagnoses     Vertigo    -  1    Moderate episode of recurrent major depressive disorder (H)          Care Instructions                   My Depression Action Plan  Name: Maite Suero   Date of Birth 1960  Date: 9/8/2017    My doctor: Yessi Dorsey   My clinic: [facility]  [address]  [facility] [address]  [phone]          GREEN    ZONE   Good Control    What it looks like:     Things are going generally well. You have normal up s and down s. You may even feel depressed from time to time, but bad moods usually last less than a day.   What you need to do:  1. Continue to care for yourself (see self care plan)  2. Check your depression survival kit and update it as needed  3. Follow your physician s recommendations including any medication.  4. Do not stop taking medication unless you consult with your physician first.           YELLOW         ZONE Getting Worse    What it looks like:     Depression is starting to interfere with your life.     It may be hard to get out of bed; you may be starting to isolate yourself from others.    Symptoms of depression are starting to last most all day and this has happened for several days.     You may have suicidal thoughts but they are not constant.   What you need to do:     1. Call your care team, your response to treatment will improve if you keep your care team informed of your progress. Yellow periods are signs an adjustment may need to be made.     2. Continue your self-care, even if you have to fake it!    3. Talk to someone in your support network    4. Open up your depression survival kit           RED    ZONE Medical Alert - Get  Help    What it looks like:     Depression is seriously interfering with your life.     You may experience these or other symptoms: You can t get out of bed most days, can t work or engage in other necessary activities, you have trouble taking care of basic hygiene, or basic responsibilities, thoughts of suicide or death that will not go away, self-injurious behavior.     What you need to do:  1. Call your care team and request a same-day appointment. If they are not available (weekends or after hours) call your local crisis line, emergency room or 911.      Electronically signed by: Lakshmi Gusman, September 8, 2017    Depression Self Care Plan / Survival Kit    Self-Care for Depression  Here s the deal. Your body and mind are really not as separate as most people think.  What you do and think affects how you feel and how you feel influences what you do and think. This means if you do things that people who feel good do, it will help you feel better.  Sometimes this is all it takes.  There is also a place for medication and therapy depending on how severe your depression is, so be sure to consult with your medical provider and/ or Behavioral Health Consultant if your symptoms are worsening or not improving.     In order to better manage my stress, I will:    Exercise  Get some form of exercise, every day. This will help reduce pain and release endorphins, the  feel good  chemicals in your brain. This is almost as good as taking antidepressants!  This is not the same as joining a gym and then never going! (they count on that by the way ) It can be as simple as just going for a walk or doing some gardening, anything that will get you moving.      Hygiene   Maintain good hygiene (Get out of bed in the morning, Make your bed, Brush your teeth, Take a shower, and Get dressed like you were going to work, even if you are unemployed).  If your clothes don't fit try to get ones that do.    Diet  I will strive to eat foods  that are good for me, drink plenty of water, and avoid excessive sugar, caffeine, alcohol, and other mood-altering substances.  Some foods that are helpful in depression are: complex carbohydrates, B vitamins, flaxseed, fish or fish oil, fresh fruits and vegetables.    Psychotherapy  I agree to participate in Individual Therapy (if recommended).    Medication  If prescribed medications, I agree to take them.  Missing doses can result in serious side effects.  I understand that drinking alcohol, or other illicit drug use, may cause potential side effects.  I will not stop my medication abruptly without first discussing it with my provider.    Staying Connected With Others  I will stay in touch with my friends, family members, and my primary care provider/team.    Use your imagination  Be creative.  We all have a creative side; it doesn t matter if it s oil painting, sand castles, or mud pies! This will also kick up the endorphins.    Witness Beauty  (AKA stop and smell the roses) Take a look outside, even in mid-winter. Notice colors, textures. Watch the squirrels and birds.     Service to others  Be of service to others.  There is always someone else in need.  By helping others we can  get out of ourselves  and remember the really important things.  This also provides opportunities for practicing all the other parts of the program.    Humor  Laugh and be silly!  Adjust your TV habits for less news and crime-drama and more comedy.    Control your stress  Try breathing deep, massage therapy, biofeedback, and meditation. Find time to relax each day.     My support system    Clinic Contact:  Phone number:    Contact 1:  Phone number:    Contact 2:  Phone number:    Religion/:  Phone number:    Therapist:  Phone number:    Local crisis center:    Phone number:    Other community support:  Phone number:              Follow-ups after your visit        Additional Services     OTOLARYNGOLOGY REFERRAL        Your provider has referred you to: ENT that treats people with vertigo ([facility] or elsewhere)    Please be aware that coverage of these services is subject to the terms and limitations of your health insurance plan.  Call member services at your health plan with any benefit or coverage questions.      Please bring the following to your appointment:  >>   Any x-rays, CTs or MRIs which have been performed.  Contact the facility where they were done to arrange for  prior to your scheduled appointment.  Any new CT, MRI or other procedures ordered by your specialist must be performed at a [facility] or coordinated by your clinic's referral office.    >>   List of current medications   >>   This referral request   >>   Any documents/labs given to you for this referral                  Follow-up notes from your care team     Return if symptoms worsen or fail to improve.      Your next 10 appointments already scheduled     Sep 12, 2017  9:30 AM CDT   (Arrive by 9:15 AM)   Hearing Eval with Malick Dawson   [facility] [place] ([facility] [place] )    [address] [place] [address]   [phone]            Sep 12, 2017 10:00 AM CDT   (Arrive by 9:45 AM)   New Visit with Mary Peterson MD   [facility] [place] ([facility] [place] )    [address] [place] [address]   [phone]            Oct 17, 2017  1:00 PM CDT   (Arrive by 12:45 PM)   Return Visit with Darling Osuna NP   [facility] ([facility] [place] )    [address]  [place] [address]   [phone]              Who to contact     If you have questions or need follow up information about today's clinic visit or your schedule please contact [facility]ONG directly at [phone].  Normal or non-critical lab and imaging results will be communicated to you by MyChart, letter or phone within 4 business days after the clinic has received the results. If  "you do not hear from us within 7 days, please contact the clinic through [employer] or phone. If you have a critical or abnormal lab result, we will notify you by phone as soon as possible.  Submit refill requests through [employer] or call your pharmacy and they will forward the refill request to us. Please allow 3 business days for your refill to be completed.          Additional Information About Your Visit        [employer] Information     [employer] lets you send messages to your doctor, view your test results, renew your prescriptions, schedule appointments and more. To sign up, go to [email or web address] . Click on \"Log in\" on the left side of the screen, which will take you to the Welcome page. Then click on \"Sign up Now\" on the right side of the page.     You will be asked to enter the access code listed below, as well as some personal information. Please follow the directions to create your username and password.     Your access code is: [email or web address]  Expires: 12/10/2017 12:51 PM     Your access code will  in 90 days. If you need help or a new code, please call your [facility] clinic or [phone].        Care EveryWhere ID     This is your Care EveryWhere ID. This could be used by other organizations to access your [facility] medical records  [id number]        Your Vitals Were     Pulse Temperature Respirations Height Pulse Oximetry BMI (Body Mass Index)    90 98  F (36.7  C) (Tympanic) 12 5' 2\" (1.575 m) 93% 50.3 kg/m2       Blood Pressure from Last 3 Encounters:   17 114/72   17 119/69   17 108/68    Weight from Last 3 Encounters:   17 275 lb (124.7 kg)   17 285 lb (129.3 kg)   10/18/16 285 lb (129.3 kg)              We Performed the Following     DEPRESSION ACTION PLAN (DAP)     OTOLARYNGOLOGY REFERRAL          Today's Medication Changes          These changes are accurate as of: 17 11:59 PM.  If you have any questions, ask your nurse or doctor.               These " medicines have changed or have updated prescriptions.        Dose/Directions    omeprazole 20 MG CR capsule   Commonly known as:  priLOSEC   This may have changed:  See the new instructions.   Used for:  Gastroesophageal reflux disease without esophagitis        TAKE ONE CAPSULE BY MOUTH TWICE DAILY   Quantity:  60 capsule   Refills:  6       scopolamine 72 hr patch   Commonly known as:  TRANSDERM   This may have changed:  See the new instructions.   Used for:  Dizziness   Changed by:  Yessi Dorsey MD        APPLY 1 PATCH TO HAIRLESS AREA BEHIND ONE EAR AT LEAST 4 HOURS BEFORE TRAVEL. REMOVE OLD PATCH AND CHANGE EVERY 3 DAYS   Quantity:  4 patch   Refills:  1            Where to get your medicines      These medications were sent to [facility] [id number] [place] [address]  AT [facility] [address] [place] [address]     Phone:  [phone]     scopolamine 72 hr patch                Primary Care Provider Office Phone # Fax #    Yessi Dorsey [phone] [address] [facility] [address]        Equal Access to Services     [facility] : Hadii aad ku hadasho Soomaali, waaxda luqadaha, qaybta kaalmada adeegyada, waxay idiin hayaan tanya abdi . So [facility] [phone].    ATENCIÓN: Si habla español, tiene a gaviria disposición servicios gratuitos de asistencia lingüística. Llame al [phone].    We comply with applicable federal civil rights laws and [place] laws. We do not discriminate on the basis of race, color, national origin, age, disability sex, sexual orientation or gender identity.            Thank you!     Thank you for choosing [facility]  for your care. Our goal is always to provide you with excellent care. Hearing back from our patients is one way we can continue to improve our services. Please take a few minutes to complete the written survey that you may receive in the mail after  your visit with us. Thank you!             Your Updated Medication List - Protect others around you: Learn how to safely use, store and throw away your medicines at www.disposemymeds.org.          This list is accurate as of: 9/8/17 11:59 PM.  Always use your most recent med list.                   Brand Name Dispense Instructions for use Diagnosis    ABILIFY PO      Take 10 mg by mouth daily        aspirin 81 MG EC tablet   Generic drug:  aspirin      Take 81 mg by mouth 2 times daily        * buPROPion 300 MG 24 hr tablet    WELLBUTRIN XL     Take by mouth every morning        * WELLBUTRIN PO      Take 150 mg by mouth daily (with breakfast)        COLACE PO      Take 100 mg by mouth daily        CRESTOR 20 MG tablet   Generic drug:  rosuvastatin     90 tablet    TAKE ONE TABLET BY MOUTH ONCE DAILY    Mixed hyperlipidemia       LAMICTAL PO           LASIX 20 MG tablet   Generic drug:  furosemide      Take 40 mg by mouth daily        meclizine 25 MG tablet    ANTIVERT    30 tablet    Take 1 tablet (25 mg) by mouth every 6 hours as needed for dizziness    Dizziness       mirabegron 50 MG 24 hr tablet    MYRBETRIQ    90 tablet    Take 1 tablet (50 mg) by mouth daily    Mixed incontinence urge and stress (male)(female), OAB (overactive bladder)       MULTIVITAMIN PO      Take 0.5 mg by mouth 2 times daily        * NASONEX 50 MCG/ACT spray   Generic drug:  mometasone     17 g    USE TWO SPRAYS IN EACH NOSTRIL ONCE DAILY    Chronic rhinitis       * NASONEX 50 MCG/ACT spray   Generic drug:  mometasone     17 g    USE TWO SPRAYS IN EACH NOSTRIL ONCE DAILY    Chronic rhinitis       omeprazole 20 MG CR capsule    priLOSEC    60 capsule    TAKE ONE CAPSULE BY MOUTH TWICE DAILY    Gastroesophageal reflux disease without esophagitis       PROBIOTIC DAILY Caps      Take by mouth daily        RITALIN LA 20 MG Cp24   Generic drug:  methylphenidate      Take 80 mg by mouth daily        ROZEREM 8 MG tablet   Generic drug:  ramelteon      30 tablet    TAKE ONE TABLET BY MOUTH AT BEDTIME    Insomnia, unspecified type       scopolamine 72 hr patch    TRANSDERM    4 patch    APPLY 1 PATCH TO HAIRLESS AREA BEHIND ONE EAR AT LEAST 4 HOURS BEFORE TRAVEL. REMOVE OLD PATCH AND CHANGE EVERY 3 DAYS    Dizziness       sertraline 100 MG tablet    ZOLOFT     Take 2 tablets by mouth daily        tiZANidine 4 MG tablet    ZANAFLEX    30 tablet    TAKE ONE TABLET BY MOUTH THREE TIMES DAILY AS NEEDED    Strain of muscle and tendon of back wall of thorax, initial encounter       * Notice:  This list has 4 medication(s) that are the same as other medications prescribed for you. Read the directions carefully, and ask your doctor or other care provider to review them with you.

## 2017-09-08 NOTE — NURSING NOTE
"Chief Complaint   Patient presents with     Dizziness     Depression     Screen due       Initial /72 (BP Location: Left arm, Patient Position: Sitting, Cuff Size: Adult Large)  Pulse 90  Temp 98  F (36.7  C) (Tympanic)  Resp 12  Ht 5' 2\" (1.575 m)  Wt 275 lb (124.7 kg)  SpO2 93%  BMI 50.3 kg/m2 Estimated body mass index is 50.3 kg/(m^2) as calculated from the following:    Height as of this encounter: 5' 2\" (1.575 m).    Weight as of this encounter: 275 lb (124.7 kg).  Medication Reconciliation: complete   Lakshmi Gusman      "

## 2017-09-09 ASSESSMENT — ANXIETY QUESTIONNAIRES: GAD7 TOTAL SCORE: 2

## 2017-09-11 DIAGNOSIS — H81.10 BPPV (BENIGN PAROXYSMAL POSITIONAL VERTIGO), UNSPECIFIED LATERALITY: Primary | ICD-10-CM

## 2017-09-11 RX ORDER — SCOLOPAMINE TRANSDERMAL SYSTEM 1 MG/1
PATCH, EXTENDED RELEASE TRANSDERMAL
Qty: 4 PATCH | Refills: 1 | Status: SHIPPED | OUTPATIENT
Start: 2017-09-11 | End: 2017-09-12

## 2017-09-12 ENCOUNTER — OFFICE VISIT (OUTPATIENT)
Dept: OTOLARYNGOLOGY | Facility: OTHER | Age: 57
End: 2017-09-12
Attending: FAMILY MEDICINE
Payer: MEDICARE

## 2017-09-12 ENCOUNTER — OFFICE VISIT (OUTPATIENT)
Dept: AUDIOLOGY | Facility: OTHER | Age: 57
End: 2017-09-12
Attending: AUDIOLOGIST
Payer: MEDICARE

## 2017-09-12 VITALS
DIASTOLIC BLOOD PRESSURE: 74 MMHG | BODY MASS INDEX: 50.61 KG/M2 | OXYGEN SATURATION: 95 % | WEIGHT: 275 LBS | HEART RATE: 86 BPM | HEIGHT: 62 IN | SYSTOLIC BLOOD PRESSURE: 118 MMHG | TEMPERATURE: 97.3 F

## 2017-09-12 DIAGNOSIS — R11.0 NAUSEA: Primary | ICD-10-CM

## 2017-09-12 DIAGNOSIS — E66.01 MORBID OBESITY DUE TO EXCESS CALORIES (H): ICD-10-CM

## 2017-09-12 DIAGNOSIS — H90.3 SENSORINEURAL HEARING LOSS, BILATERAL: ICD-10-CM

## 2017-09-12 DIAGNOSIS — H81.10 BPPV (BENIGN PAROXYSMAL POSITIONAL VERTIGO), UNSPECIFIED LATERALITY: ICD-10-CM

## 2017-09-12 DIAGNOSIS — R42 VERTIGO: ICD-10-CM

## 2017-09-12 DIAGNOSIS — H90.3 SENSORINEURAL HEARING LOSS (SNHL) OF BOTH EARS: ICD-10-CM

## 2017-09-12 DIAGNOSIS — R42 DIZZINESS: ICD-10-CM

## 2017-09-12 PROCEDURE — 92504 EAR MICROSCOPY EXAMINATION: CPT | Performed by: OTOLARYNGOLOGY

## 2017-09-12 PROCEDURE — 92567 TYMPANOMETRY: CPT | Performed by: AUDIOLOGIST

## 2017-09-12 PROCEDURE — 99214 OFFICE O/P EST MOD 30 MIN: CPT | Mod: 25

## 2017-09-12 PROCEDURE — 92557 COMPREHENSIVE HEARING TEST: CPT | Performed by: AUDIOLOGIST

## 2017-09-12 PROCEDURE — 99203 OFFICE O/P NEW LOW 30 MIN: CPT | Mod: 25 | Performed by: OTOLARYNGOLOGY

## 2017-09-12 PROCEDURE — 99203 OFFICE O/P NEW LOW 30 MIN: CPT

## 2017-09-12 RX ORDER — FUROSEMIDE 20 MG
20 TABLET ORAL DAILY
COMMUNITY
Start: 2013-06-17 | End: 2017-09-13

## 2017-09-12 RX ORDER — ONDANSETRON 4 MG/1
4-8 TABLET, ORALLY DISINTEGRATING ORAL
Qty: 20 TABLET | Refills: 2 | Status: SHIPPED | OUTPATIENT
Start: 2017-09-12 | End: 2017-10-17

## 2017-09-12 RX ORDER — BUPROPION HYDROCHLORIDE 300 MG/1
300 TABLET ORAL DAILY
COMMUNITY
Start: 2012-01-31

## 2017-09-12 RX ORDER — SERTRALINE HYDROCHLORIDE 100 MG/1
100 TABLET, FILM COATED ORAL DAILY
COMMUNITY
Start: 2013-03-26 | End: 2017-10-17

## 2017-09-12 RX ORDER — METOPROLOL TARTRATE 25 MG/1
12.5 TABLET, FILM COATED ORAL DAILY
COMMUNITY
Start: 2013-08-08 | End: 2017-11-20

## 2017-09-12 RX ORDER — MULTIVIT-MIN/IRON/FOLIC ACID/K 18-600-40
1 CAPSULE ORAL DAILY
COMMUNITY

## 2017-09-12 RX ORDER — DIAZEPAM 5 MG
5 TABLET ORAL EVERY 6 HOURS PRN
Qty: 30 TABLET | Refills: 0 | Status: SHIPPED | OUTPATIENT
Start: 2017-09-12 | End: 2017-09-20

## 2017-09-12 RX ORDER — MIRABEGRON 50 MG/1
50 TABLET, EXTENDED RELEASE ORAL DAILY
COMMUNITY
End: 2017-10-17

## 2017-09-12 RX ORDER — PNV NO.95/FERROUS FUM/FOLIC AC 28MG-0.8MG
1 TABLET ORAL DAILY
COMMUNITY
End: 2017-10-17

## 2017-09-12 RX ORDER — METHYLPHENIDATE HYDROCHLORIDE 20 MG/1
80 CAPSULE, EXTENDED RELEASE ORAL DAILY
COMMUNITY
End: 2017-10-17

## 2017-09-12 RX ORDER — FEXOFENADINE HCL 180 MG/1
180 TABLET ORAL DAILY
COMMUNITY
Start: 2012-08-10 | End: 2017-10-17

## 2017-09-12 RX ORDER — KETOROLAC TROMETHAMINE 10 MG/1
10 TABLET, FILM COATED ORAL DAILY
COMMUNITY
Start: 2016-05-14 | End: 2017-10-17

## 2017-09-12 RX ORDER — METHYLPREDNISOLONE 4 MG
25 TABLET, DOSE PACK ORAL DAILY
COMMUNITY
Start: 2016-05-14 | End: 2017-10-17

## 2017-09-12 RX ORDER — DIPHENOXYLATE HYDROCHLORIDE AND ATROPINE SULFATE 2.5; .025 MG/1; MG/1
1 TABLET ORAL DAILY
COMMUNITY
Start: 2012-11-27 | End: 2017-10-17

## 2017-09-12 RX ORDER — UBIDECARENONE 100 MG
100 CAPSULE ORAL DAILY
COMMUNITY
Start: 2013-05-24 | End: 2017-10-17

## 2017-09-12 ASSESSMENT — PAIN SCALES - GENERAL: PAINLEVEL: NO PAIN (0)

## 2017-09-12 NOTE — PROGRESS NOTES
Otolaryngology Consultation    Patient: Maite Suero  : 1960    Patient presents with:  Consult: Vertigo/[facility] referred      HPI:  Maite Suero is a 57 year old female seen today for vertigo  She describes 2 months of intermittent spinning sensation, or feeling that floor is moving while walking.  Episodes last minutes with residual unsteadiness for hours  Associated nausea and emesis  She is not driving  Recent onset left otalgia without otorrhea  No chronic otitis media  No flux HL, no bothersome tinnitus or aural fullness  No facial weakness or numbness, no dysphagia    An audiogram and tympanogram were performed today as part of the evaluation and personally reviewed. The tympanogram shows a normal Type A curve, with normal canal volume and middle ear pressure.  There is no sign of eustachian tube dysfunction or middle ear effusion.  The audiogram shows near normal thresholds to mild mid frequency sensorineural hearing loss without conductive hearing loss or significant asymmetry.  SRT 25 to 30 dB  Some problems with communication due to HL, present for over 1 year  No sudden HL or change in hearing with vertigo    She has taken meclizine and scopolomine to no avail  Denies any other new rx    Current Outpatient Rx   Medication Sig Dispense Refill     Multiple Vitamin (MULTI-VITAMINS) TABS Take 1 capsule by mouth daily       metoprolol (LOPRESSOR) 25 MG tablet Take 12.5 mg by mouth daily       methylPREDNISolone (MEDROL DOSEPAK) 4 MG tablet Take 25 mg by mouth daily       ketorolac (TORADOL) 10 MG tablet Take 10 mg by mouth daily       fexofenadine (ALLEGRA) 180 MG tablet Take 180 mg by mouth daily       co-enzyme Q-10 100 MG CAPS capsule Take 100 mg by mouth daily       buPROPion (WELLBUTRIN XL) 300 MG 24 hr tablet Take 450 mg by mouth daily       furosemide (LASIX) 20 MG tablet Take 20 mg by mouth daily       sertraline (ZOLOFT) 100 MG tablet Take 100 mg by mouth daily       Ferrous  Sulfate (IRON) 325 (65 FE) MG tablet Take 1 tablet by mouth daily       Cholecalciferol (VITAMIN D) 2000 UNITS CAPS Take 1 capsule by mouth daily       Amino Acids (DAILY AMINO 6000 PO) Take 100 mg by mouth daily       methylphenidate (RITALIN LA) 20 MG CP24 Take 80 mg by mouth daily       mirabegron (MYRBETRIQ) 50 MG 24 hr tablet Take 50 mg by mouth daily       scopolamine (TRANSDERM) 72 hr patch APPLY 1 PATCH TO HAIRLESS AREA BEHIND ONE EAR AT LEAST 4 HOURS BEFORE TRAVEL. REMOVE OLD PATCH AND CHANGE EVERY 3 DAYS 4 patch 1     BuPROPion HCl (WELLBUTRIN PO) Take 150 mg by mouth daily (with breakfast)       LamoTRIgine (LAMICTAL PO)        meclizine (ANTIVERT) 25 MG tablet Take 1 tablet (25 mg) by mouth every 6 hours as needed for dizziness 30 tablet 0     NASONEX 50 MCG/ACT spray USE TWO SPRAYS IN EACH NOSTRIL ONCE DAILY 17 g 6     omeprazole (PRILOSEC) 20 MG CR capsule TAKE ONE CAPSULE BY MOUTH TWICE DAILY (Patient taking differently: 2 every morning) 60 capsule 6     mirabegron (MYRBETRIQ) 50 MG 24 hr tablet Take 1 tablet (50 mg) by mouth daily 90 tablet 1     ROZEREM 8 MG tablet TAKE ONE TABLET BY MOUTH AT BEDTIME 30 tablet 5     CRESTOR 20 MG tablet TAKE ONE TABLET BY MOUTH ONCE DAILY 90 tablet 3     tiZANidine (ZANAFLEX) 4 MG tablet TAKE ONE TABLET BY MOUTH THREE TIMES DAILY AS NEEDED 30 tablet 0     furosemide (LASIX) 20 MG tablet Take 40 mg by mouth daily       Docusate Sodium (COLACE PO) Take 100 mg by mouth daily       NASONEX 50 MCG/ACT nasal spray USE TWO SPRAYS IN EACH NOSTRIL ONCE DAILY 17 g 5     methylphenidate (RITALIN LA) 20 MG CP24 Take 80 mg by mouth daily       Multiple Vitamins-Minerals (MULTIVITAMIN PO) Take 0.5 mg by mouth 2 times daily       ARIPiprazole (ABILIFY PO) Take 10 mg by mouth daily       Probiotic Product (PROBIOTIC DAILY) CAPS Take by mouth daily       aspirin (ASPIRIN) 81 MG EC tablet Take 81 mg by mouth 2 times daily        buPROPion (WELLBUTRIN XL) 300 MG 24 hr tablet Take by  "mouth every morning       sertraline (ZOLOFT) 100 MG tablet Take 2 tablets by mouth daily         Allergies: Diagnostic x-ray materials; Shellfish allergy; Ambien [zolpidem]; Ivp dye [contrast dye]; Pork allergy; Seasonal; and Povidone iodine     Past Medical History:   Diagnosis Date     Ankle sprain 10/3/2013     Irritable bowel      Major depressive disorder, recurrent episode, unspecified 10/3/2013     Morbid obesity (H)      Other and unspecified hyperlipidemia 10/3/2013     Plantar fasciitis      Unspecified essential hypertension 10/3/2013     Weight gain 10/3/2013       Past Surgical History:   Procedure Laterality Date     cholecystectomy       COLONOSCOPY N/A 10/9/2014    Procedure: COLONOSCOPY;  Surgeon: Vandana Daniels MD;  Location: [facility]     GYN SURGERY       HYSTERECTOMY      fibroids       ENT family history reviewed    Social History   Substance Use Topics     Smoking status: Never Smoker     Smokeless tobacco: Never Used     Alcohol use No       Review of Systems  ROS: 10 point ROS neg other than the symptoms noted above in the HPI and joint pain, depression    Physical Exam  /74 (BP Location: Left arm, Patient Position: Chair, Cuff Size: Adult Large)  Pulse 86  Temp 97.3  F (36.3  C) (Tympanic)  Ht 1.575 m (5' 2\")  Wt 124.7 kg (275 lb)  SpO2 95%  BMI 50.3 kg/m2  General - Alert and oriented to person and place, answers questions and cooperates with examination appropriately.  Morbidly obese.  Nervous  Rhomberg unsteady, gait unsteady  Head and Face - Normocephalic and atraumatic, with no gross asymmetry noted.  The facial nerve is intact, with strong symmetric movements.  Voice and Breathing - The patient was breathing comfortably without the use of accessory muscles. There was no wheezing, stridor, or stertor.  The patients voice was clear and strong, and had appropriate pitch and quality.  Ears -examined under microscopy bilaterally  The external auditory canals are patent, the " tympanic membranes are intact without effusion, retraction or mass.  Bony landmarks are intact.  Eyes - Extraocular movements intact, and the pupils were reactive to light.  Sclera were not icteric or injected, conjunctiva were pink and moist. No spontaneous nystagmus  Mouth - Examination of the oral cavity showed pink, healthy oral mucosa. No lesions or ulcerations noted.  The tongue was mobile and midline, and the dentition were in good condition.    Throat - The walls of the oropharynx were smooth, pink, moist, symmetric, and had no lesions or ulcerations.  The tonsillar pillars and soft palate were symmetric.  The uvula was midline on elevation.    Neck - Normal midline excursion of the laryngotracheal complex during swallowing.  Full range of motion on passive movement.  Palpation of the occipital, submental, submandibular, internal jugular chain, and supraclavicular nodes did not demonstrate any abnormal lymph nodes or masses.  Palpation of the thyroid was soft and smooth, with no nodules or goiter appreciated.  The trachea was mobile and midline.  Nose - External contour is symmetric, no gross deflection or scars.  Nasal mucosa is pink and moist with no abnormal mucus.  The septum and turbinates were evaluated: non obstructive.  No polyps, masses, or purulence noted on examination.    brooke hallpike maneuver performed with reproducible vertigo bilaterally with return to upright positioning, no nystagmus    Impression and Plan- Maite GAVIOTA Suero is a 57 year old female with:    ICD-10-CM    1. Nausea R11.0 ondansetron (ZOFRAN ODT) 4 MG ODT tab   2. Vertigo R42 diazepam (VALIUM) 5 MG tablet   3. BPPV (benign paroxysmal positional vertigo), unspecified laterality H81.10 PHYSICAL THERAPY REFERRAL   4. Morbid obesity due to excess calories (H) E66.01    5. Sensorineural hearing loss (SNHL) of both ears H90.3          No driving    Follow up with Vestibular Therapy  Discontinue Meclizine/Scopalamine  Use Zofran as  needed for Nausea  Use Valium as needed for Vertigo  Repeat Audiogram in 1 year  Follow up with CHEVY Marrufo in 1-2 months  Consider normal pressure hydrocephalus if persistent, d/w Melaura today  May require neurology referral  Weight loss encouraged      Mary Peterson D.O.  Otolaryngology/Head and Neck Surgery  Allergy

## 2017-09-12 NOTE — MR AVS SNAPSHOT
After Visit Summary   9/12/2017    Maite Suero    MRN: [id number]           Patient Information     Date Of Birth          1960        Visit Information        Provider Department      9/12/2017 10:00 AM Mary Peterson MD [facility]        Today's Diagnoses     Vertigo          Care Instructions    Thank you for allowing Dr. Peterson and our ENT team to participate in your care.  If you have a scheduling or an appointment question please contact [facility] Unit Coordinator at their direct line [phone].   ALL nursing questions or concerns can be directed to your ENT nurse at: [phone] - Alyce    Follow up with Vestibular Therapy  Discontinue Meclizine/Scopalamine  Use Zofran as needed for Nausea  Use Valium as needed for Vertigo  Repeat Audiogram in 1 year  Follow up with CHEVY Marrufo in 1-2months          Follow-ups after your visit        Follow-up notes from your care team     Return in about 4 weeks (around 10/10/2017).      Your next 10 appointments already scheduled     Oct 17, 2017  1:00 PM CDT   (Arrive by 12:45 PM)   Return Visit with Darling [facility]   [facility] )    [address]  [facility] [address]   [phone]              Who to contact     If you have questions or need follow up information about today's clinic visit or your schedule please contact [facility] directly at [phone].  Normal or non-critical lab and imaging results will be communicated to you by MyChart, letter or phone within 4 business days after the clinic has received the results. If you do not hear from us within 7 days, please contact the clinic through MyChart or phone. If you have a critical or abnormal lab result, we will notify you by phone as soon as possible.  Submit refill requests through [employer] or call your pharmacy and they will forward the refill request to us. Please allow 3 business  "days for your refill to be completed.          Additional Information About Your Visit        [employer] Information     [employer] lets you send messages to your doctor, view your test results, renew your prescriptions, schedule appointments and more. To sign up, go to [email or web address] . Click on \"Log in\" on the left side of the screen, which will take you to the Welcome page. Then click on \"Sign up Now\" on the right side of the page.     You will be asked to enter the access code listed below, as well as some personal information. Please follow the directions to create your username and password.     Your access code is: [email or web address]  Expires: 12/10/2017 12:51 PM     Your access code will  in 90 days. If you need help or a new code, please call your [place] clinic or [phone].        Care EveryWhere ID     This is your Care EveryWhere ID. This could be used by other organizations to access your [place] medical records  [id number]        Your Vitals Were     Pulse Temperature Height Pulse Oximetry BMI (Body Mass Index)       86 97.3  F (36.3  C) (Tympanic) 5' 2\" (1.575 m) 95% 50.3 kg/m2        Blood Pressure from Last 3 Encounters:   17 118/74   17 114/72   17 119/69    Weight from Last 3 Encounters:   17 275 lb (124.7 kg)   17 275 lb (124.7 kg)   17 285 lb (129.3 kg)              Today, you had the following     No orders found for display         Today's Medication Changes          These changes are accurate as of: 17 10:09 AM.  If you have any questions, ask your nurse or doctor.               These medicines have changed or have updated prescriptions.        Dose/Directions    omeprazole 20 MG CR capsule   Commonly known as:  priLOSEC   This may have changed:  See the new instructions.   Used for:  Gastroesophageal reflux disease without esophagitis        TAKE ONE CAPSULE BY MOUTH TWICE DAILY   Quantity:  60 capsule   Refills:  6                Primary " Care Provider Office Phone # Fax #    Yessi Dorsey [phone] [facility] [address]        Equal Access to Services     EVE GONZALEZ : Hadii aad ku hadadenserena Eloisamatt, wakraigda luisaireese, qaybta kafrancisco javierda manuel, michael beasleyshahnaz galvinjan landaerasmo muñoz. So [facility] [phone].    ATENCIÓN: Si habla español, tiene a gaviria disposición servicios gratuitos de asistencia lingüística. Llame al [phone].    We comply with applicable federal civil rights laws and [place] laws. We do not discriminate on the basis of race, color, national origin, age, disability sex, sexual orientation or gender identity.            Thank you!     Thank you for choosing [facility]  for your care. Our goal is always to provide you with excellent care. Hearing back from our patients is one way we can continue to improve our services. Please take a few minutes to complete the written survey that you may receive in the mail after your visit with us. Thank you!             Your Updated Medication List - Protect others around you: Learn how to safely use, store and throw away your medicines at www.disposemymeds.org.          This list is accurate as of: 9/12/17 10:09 AM.  Always use your most recent med list.                   Brand Name Dispense Instructions for use Diagnosis    ABILIFY PO      Take 10 mg by mouth daily        aspirin 81 MG EC tablet   Generic drug:  aspirin      Take 81 mg by mouth 2 times daily        * buPROPion 300 MG 24 hr tablet    WELLBUTRIN XL     Take by mouth every morning        * WELLBUTRIN PO      Take 150 mg by mouth daily (with breakfast)        * buPROPion 300 MG 24 hr tablet    WELLBUTRIN XL     Take 450 mg by mouth daily        co-enzyme Q-10 100 MG Caps capsule      Take 100 mg by mouth daily        COLACE PO      Take 100 mg by mouth daily        CRESTOR 20 MG tablet   Generic drug:  rosuvastatin     90 tablet    TAKE ONE TABLET BY MOUTH  ONCE DAILY    Mixed hyperlipidemia       DAILY AMINO 6000 PO      Take 100 mg by mouth daily        fexofenadine 180 MG tablet    ALLEGRA     Take 180 mg by mouth daily        iron 325 (65 FE) MG tablet      Take 1 tablet by mouth daily        ketorolac 10 MG tablet    TORADOL     Take 10 mg by mouth daily        LAMICTAL PO           * LASIX 20 MG tablet   Generic drug:  furosemide      Take 40 mg by mouth daily        * furosemide 20 MG tablet    LASIX     Take 20 mg by mouth daily        meclizine 25 MG tablet    ANTIVERT    30 tablet    Take 1 tablet (25 mg) by mouth every 6 hours as needed for dizziness    Dizziness       methylPREDNISolone 4 MG tablet    MEDROL DOSEPAK     Take 25 mg by mouth daily        metoprolol 25 MG tablet    LOPRESSOR     Take 12.5 mg by mouth daily        * mirabegron 50 MG 24 hr tablet    MYRBETRIQ     Take 50 mg by mouth daily        * mirabegron 50 MG 24 hr tablet    MYRBETRIQ    90 tablet    Take 1 tablet (50 mg) by mouth daily    Mixed incontinence urge and stress (male)(female), OAB (overactive bladder)       MULTI-VITAMINS Tabs      Take 1 capsule by mouth daily        MULTIVITAMIN PO      Take 0.5 mg by mouth 2 times daily        * NASONEX 50 MCG/ACT spray   Generic drug:  mometasone     17 g    USE TWO SPRAYS IN EACH NOSTRIL ONCE DAILY    Chronic rhinitis       * NASONEX 50 MCG/ACT spray   Generic drug:  mometasone     17 g    USE TWO SPRAYS IN EACH NOSTRIL ONCE DAILY    Chronic rhinitis       omeprazole 20 MG CR capsule    priLOSEC    60 capsule    TAKE ONE CAPSULE BY MOUTH TWICE DAILY    Gastroesophageal reflux disease without esophagitis       PROBIOTIC DAILY Caps      Take by mouth daily        * RITALIN LA 20 MG Cp24   Generic drug:  methylphenidate      Take 80 mg by mouth daily        * methylphenidate 20 MG Cp24    RITALIN LA     Take 80 mg by mouth daily        ROZEREM 8 MG tablet   Generic drug:  ramelteon     30 tablet    TAKE ONE TABLET BY MOUTH AT BEDTIME     Insomnia, unspecified type       scopolamine 72 hr patch    TRANSDERM    4 patch    APPLY 1 PATCH TO HAIRLESS AREA BEHIND ONE EAR AT LEAST 4 HOURS BEFORE TRAVEL. REMOVE OLD PATCH AND CHANGE EVERY 3 DAYS    Dizziness       * sertraline 100 MG tablet    ZOLOFT     Take 2 tablets by mouth daily        * sertraline 100 MG tablet    ZOLOFT     Take 100 mg by mouth daily        tiZANidine 4 MG tablet    ZANAFLEX    30 tablet    TAKE ONE TABLET BY MOUTH THREE TIMES DAILY AS NEEDED    Strain of muscle and tendon of back wall of thorax, initial encounter       vitamin D 2000 UNITS Caps      Take 1 capsule by mouth daily        * Notice:  This list has 13 medication(s) that are the same as other medications prescribed for you. Read the directions carefully, and ask your doctor or other care provider to review them with you.

## 2017-09-12 NOTE — NURSING NOTE
"Chief Complaint   Patient presents with     Consult     Vertigo/[place] referred       Initial /74 (BP Location: Left arm, Patient Position: Chair, Cuff Size: Adult Large)  Pulse 86  Temp 97.3  F (36.3  C) (Tympanic)  Ht 5' 2\" (1.575 m)  Wt 275 lb (124.7 kg)  SpO2 95%  BMI 50.3 kg/m2 Estimated body mass index is 50.3 kg/(m^2) as calculated from the following:    Height as of this encounter: 5' 2\" (1.575 m).    Weight as of this encounter: 275 lb (124.7 kg).  Medication Reconciliation: complete   Keli Cooley          "

## 2017-09-12 NOTE — PATIENT INSTRUCTIONS
Thank you for allowing Dr. Peterson and our ENT team to participate in your care.  If you have a scheduling or an appointment question please contact [facility] Unit Coordinator at their direct line [phone].   ALL nursing questions or concerns can be directed to your ENT nurse at: [phone] - Alyce    Follow up with Vestibular Therapy  Discontinue Meclizine/Scopalamine  Use Zofran as needed for Nausea  Use Valium as needed for Vertigo  Repeat Audiogram in 1 year  Follow up with CHEVY Marrufo in 1-2months

## 2017-09-12 NOTE — PROGRESS NOTES
Audiology Evaluation Completed. Please refer SCANNED AUDIOGRAM and/or TYMPANOGRAM for BACKGROUND, RESULTS, RECOMMENDATIONS.    UNDER RECOMMENDATIONS ON AUDIOGRAM PATIENT REFERRED TO ENT WITH SYMPTOMS      *Left ear pain reported with tympanogram left ear.      Kelin ELIAS, [facility]  Audiologist #[id number]        NO EPIC REFERRAL/ORDER NEEDED TO ENT BY AUDIOLOGY AS PATIENT ALREADY HAS AN APPOINTMENT WITH ENT

## 2017-09-12 NOTE — MR AVS SNAPSHOT
After Visit Summary   9/12/2017    Maite Suero    MRN: [id number]           Patient Information     Date Of Birth          1960        Visit Information        Provider Department      9/12/2017 9:30 AM Kelin Love [facility]        Today's Diagnoses     Sensorineural hearing loss, bilateral        Dizziness           Follow-ups after your visit        Your next 10 appointments already scheduled     Sep 12, 2017  9:30 AM CDT   (Arrive by 9:15 AM)   Hearing Eval with Malick Dawson   [facility] [place] ([facility] [place] )    [address]  [place] [address]   [phone]            Sep 12, 2017 10:00 AM CDT   (Arrive by 9:45 AM)   New Visit with Mary Peterson MD   [facility] [place] ([facility] [place] )    [address]  [place] [address]   [phone]            Oct 17, 2017  1:00 PM CDT   (Arrive by 12:45 PM)   Return Visit with Darling Osuna NP   [facility] [place] ([facility] [place] )    [address]  [place] [address]   [phone]              Who to contact     If you have questions or need follow up information about today's clinic visit or your schedule please contact [facility] directly at [phone].  Normal or non-critical lab and imaging results will be communicated to you by [employer], letter or phone within 4 business days after the clinic has received the results. If you do not hear from us within 7 days, please contact the clinic through [employer] or phone. If you have a critical or abnormal lab result, we will notify you by phone as soon as possible.  Submit refill requests through [employer] or call your pharmacy and they will forward the refill request to us. Please allow 3 business days for your refill to be completed.          Additional Information About Your Visit        [employer] Information     [employer] lets you send messages to your doctor, view  "your test results, renew your prescriptions, schedule appointments and more. To sign up, go to [email or web address] . Click on \"Log in\" on the left side of the screen, which will take you to the Welcome page. Then click on \"Sign up Now\" on the right side of the page.     You will be asked to enter the access code listed below, as well as some personal information. Please follow the directions to create your username and password.     Your access code is: [email or web address]  Expires: 12/10/2017 12:51 PM     Your access code will  in 90 days. If you need help or a new code, please call your [place] clinic or [phone].        Care EveryWhere ID     This is your Care EveryWhere ID. This could be used by other organizations to access your [place] medical records  [id number]         Blood Pressure from Last 3 Encounters:   17 114/72   17 119/69   17 108/68    Weight from Last 3 Encounters:   17 275 lb (124.7 kg)   17 285 lb (129.3 kg)   10/18/16 285 lb (129.3 kg)              We Performed the Following     AUDIOGRAM/TYMPANOGRAM - INTERFACE          Today's Medication Changes          These changes are accurate as of: 17  9:23 AM.  If you have any questions, ask your nurse or doctor.               These medicines have changed or have updated prescriptions.        Dose/Directions    omeprazole 20 MG CR capsule   Commonly known as:  priLOSEC   This may have changed:  See the new instructions.   Used for:  Gastroesophageal reflux disease without esophagitis        TAKE ONE CAPSULE BY MOUTH TWICE DAILY   Quantity:  60 capsule   Refills:  6                Primary Care Provider Office Phone # Fax #    Yessi Dorsey [phone] [facility] [address]        Equal Access to Services     EVE GONZALEZ AH: Aakash Webster, yves ramon, qaybta kaalmayazan jackson, michael muñoz. So wac " [phone].    ATENCIÓN: Si bettina araujo, tiene a gaviria disposición servicios gratuitos de asistencia lingüística. Olegario lizama [phone].    We comply with applicable federal civil rights laws and [place] laws. We do not discriminate on the basis of race, color, national origin, age, disability sex, sexual orientation or gender identity.            Thank you!     Thank you for choosing [facility]  for your care. Our goal is always to provide you with excellent care. Hearing back from our patients is one way we can continue to improve our services. Please take a few minutes to complete the written survey that you may receive in the mail after your visit with us. Thank you!             Your Updated Medication List - Protect others around you: Learn how to safely use, store and throw away your medicines at www.disposemymeds.org.          This list is accurate as of: 9/12/17  9:23 AM.  Always use your most recent med list.                   Brand Name Dispense Instructions for use Diagnosis    ABILIFY PO      Take 10 mg by mouth daily        aspirin 81 MG EC tablet   Generic drug:  aspirin      Take 81 mg by mouth 2 times daily        * buPROPion 300 MG 24 hr tablet    WELLBUTRIN XL     Take by mouth every morning        * WELLBUTRIN PO      Take 150 mg by mouth daily (with breakfast)        COLACE PO      Take 100 mg by mouth daily        CRESTOR 20 MG tablet   Generic drug:  rosuvastatin     90 tablet    TAKE ONE TABLET BY MOUTH ONCE DAILY    Mixed hyperlipidemia       LAMICTAL PO           LASIX 20 MG tablet   Generic drug:  furosemide      Take 40 mg by mouth daily        meclizine 25 MG tablet    ANTIVERT    30 tablet    Take 1 tablet (25 mg) by mouth every 6 hours as needed for dizziness    Dizziness       mirabegron 50 MG 24 hr tablet    MYRBETRIQ    90 tablet    Take 1 tablet (50 mg) by mouth daily    Mixed incontinence urge and stress (male)(female), OAB (overactive bladder)       MULTIVITAMIN  PO      Take 0.5 mg by mouth 2 times daily        * NASONEX 50 MCG/ACT spray   Generic drug:  mometasone     17 g    USE TWO SPRAYS IN EACH NOSTRIL ONCE DAILY    Chronic rhinitis       * NASONEX 50 MCG/ACT spray   Generic drug:  mometasone     17 g    USE TWO SPRAYS IN EACH NOSTRIL ONCE DAILY    Chronic rhinitis       omeprazole 20 MG CR capsule    priLOSEC    60 capsule    TAKE ONE CAPSULE BY MOUTH TWICE DAILY    Gastroesophageal reflux disease without esophagitis       PROBIOTIC DAILY Caps      Take by mouth daily        RITALIN LA 20 MG Cp24   Generic drug:  methylphenidate      Take 80 mg by mouth daily        ROZEREM 8 MG tablet   Generic drug:  ramelteon     30 tablet    TAKE ONE TABLET BY MOUTH AT BEDTIME    Insomnia, unspecified type       scopolamine 72 hr patch    TRANSDERM    4 patch    APPLY 1 PATCH TO HAIRLESS AREA BEHIND ONE EAR AT LEAST 4 HOURS BEFORE TRAVEL. REMOVE OLD PATCH AND CHANGE EVERY 3 DAYS    Dizziness       sertraline 100 MG tablet    ZOLOFT     Take 2 tablets by mouth daily        tiZANidine 4 MG tablet    ZANAFLEX    30 tablet    TAKE ONE TABLET BY MOUTH THREE TIMES DAILY AS NEEDED    Strain of muscle and tendon of back wall of thorax, initial encounter       * Notice:  This list has 4 medication(s) that are the same as other medications prescribed for you. Read the directions carefully, and ask your doctor or other care provider to review them with you.

## 2017-09-13 DIAGNOSIS — R60.9 EDEMA, UNSPECIFIED TYPE: Primary | ICD-10-CM

## 2017-09-13 NOTE — TELEPHONE ENCOUNTER
Lasix      Last Written Prescription Date: unknown, not prescribed here  Last Fill Quantity: unknown, # refills: unknown  Last Office Visit with [facility] or [facility] prescribing provider: 9/8/17  Next 5 appointments (look out 90 days)     Oct 17, 2017  1:00 PM CDT   (Arrive by 12:45 PM)   Return Visit with Darling Osuna NP   [facility] [place] ([facility] [place] )    [address]  [place] [address]   [phone]            Oct 17, 2017  1:45 PM CDT   (Arrive by 1:30 PM)   Return Visit with Alycia Armenta PA-C   [facility] [place] ([facility] [place] )    [address]  [place] [address]   [phone]                   Potassium   Date Value Ref Range Status   01/05/2017 3.8 3.4 - 5.3 mmol/L Final     Creatinine   Date Value Ref Range Status   01/05/2017 0.78 0.52 - 1.04 mg/dL Final     BP Readings from Last 3 Encounters:   09/12/17 118/74   09/08/17 114/72   03/27/17 119/69

## 2017-09-14 RX ORDER — FUROSEMIDE 20 MG
TABLET ORAL
Qty: 90 TABLET | Refills: 0 | Status: SHIPPED | OUTPATIENT
Start: 2017-09-14 | End: 2017-10-13

## 2017-09-20 DIAGNOSIS — R42 VERTIGO: ICD-10-CM

## 2017-09-21 ENCOUNTER — TELEPHONE (OUTPATIENT)
Dept: FAMILY MEDICINE | Facility: OTHER | Age: 57
End: 2017-09-21

## 2017-09-21 DIAGNOSIS — R35.0 URINARY FREQUENCY: Primary | ICD-10-CM

## 2017-09-21 RX ORDER — DIAZEPAM 5 MG
TABLET ORAL
Qty: 30 TABLET | Refills: 1 | Status: SHIPPED | OUTPATIENT
Start: 2017-09-21 | End: 2017-10-17

## 2017-09-21 NOTE — TELEPHONE ENCOUNTER
8:23 AM    Reason for Call: Phone Call    Description: Patient has possible UTI, no pain, but frequent urination and would like an antibiotic sent to [facility].     Was an appointment offered for this call? No  If yes : Appointment type              Date    Preferred method for responding to this message: Telephone Call  What is your phone number ?    If we cannot reach you directly, may we leave a detailed response at the number you provided? Yes    Can this message wait until your PCP/provider returns, if available today? Not applicable,     Genie Jones

## 2017-09-21 NOTE — TELEPHONE ENCOUNTER
Controlled Substance Refill Request for Valium  Problem List Complete:  No     PROVIDER TO CONSIDER COMPLETION OF PROBLEM LIST AND OVERVIEW/CONTROLLED SUBSTANCE AGREEMENT    Last Written Prescription Date:  09/12/17  Last Fill Quantity: 30,   # refills: 0    Last Office Visit with [facility] primary care provider: 09/08/17    Future Office visit:   Next 5 appointments (look out 90 days)     Oct 17, 2017  1:00 PM CDT   (Arrive by 12:45 PM)   Return Visit with Darling Osuna NP   [facility] [place] ([facility] [place] )    [address] [place] [place] [address]   [phone]            Oct 17, 2017  1:45 PM CDT   (Arrive by 1:30 PM)   Return Visit with Alycia Armenta PA-C   [facility] [place] ([facility] [place] )    [address] [place] [address]   [phone]                  Controlled substance agreement on file: No.     Processing:  Fax Rx to [facility] pharmacy

## 2017-09-22 DIAGNOSIS — R35.0 URINARY FREQUENCY: ICD-10-CM

## 2017-09-22 LAB
ALBUMIN UR-MCNC: NEGATIVE MG/DL
APPEARANCE UR: CLEAR
BILIRUB UR QL STRIP: NEGATIVE
COLOR UR AUTO: YELLOW
GLUCOSE UR STRIP-MCNC: NEGATIVE MG/DL
HGB UR QL STRIP: ABNORMAL
KETONES UR STRIP-MCNC: NEGATIVE MG/DL
LEUKOCYTE ESTERASE UR QL STRIP: ABNORMAL
NITRATE UR QL: NEGATIVE
PH UR STRIP: 6 PH (ref 5–7)
RBC #/AREA URNS AUTO: NORMAL /HPF
SOURCE: ABNORMAL
SP GR UR STRIP: <=1.005 (ref 1–1.03)
UROBILINOGEN UR STRIP-ACNC: 0.2 EU/DL (ref 0.2–1)
WBC #/AREA URNS AUTO: NORMAL /HPF

## 2017-09-22 PROCEDURE — 81001 URINALYSIS AUTO W/SCOPE: CPT | Mod: ZL | Performed by: FAMILY MEDICINE

## 2017-09-26 ENCOUNTER — HOSPITAL ENCOUNTER (OUTPATIENT)
Dept: PHYSICAL THERAPY | Facility: HOSPITAL | Age: 57
Setting detail: THERAPIES SERIES
End: 2017-09-26
Attending: OTOLARYNGOLOGY
Payer: MEDICARE

## 2017-09-26 DIAGNOSIS — H81.11 BPPV (BENIGN PAROXYSMAL POSITIONAL VERTIGO), RIGHT: Primary | ICD-10-CM

## 2017-09-26 PROCEDURE — 97530 THERAPEUTIC ACTIVITIES: CPT | Mod: GP

## 2017-09-26 PROCEDURE — G8981 BODY POS CURRENT STATUS: HCPCS | Mod: GP,CI

## 2017-09-26 PROCEDURE — G8982 BODY POS GOAL STATUS: HCPCS | Mod: GP,CI

## 2017-09-26 PROCEDURE — 40000719 ZZHC STATISTIC PT DEPARTMENT NEURO VISIT

## 2017-09-26 PROCEDURE — 97161 PT EVAL LOW COMPLEX 20 MIN: CPT | Mod: GP

## 2017-09-26 NOTE — TELEPHONE ENCOUNTER
Dr. Peterson,   Please specify a side for vertigo or medicare will not pay for the appointment for Maite. Thanks Alycia

## 2017-09-26 NOTE — PROGRESS NOTES
09/26/17 1135   Quick Adds   Quick Adds Certification;Vestibular Eval   Type of Visit Initial OP PT Evaluation   General Information   Start of Care Date 09/26/17   Referring Physician DR. Peterson   Orders Evaluate and Treat as Indicated   Order Date 09/12/17   Medical Diagnosis vertigo   Onset of illness/injury or Date of Surgery 08/14/17   Surgical/Medical history reviewed Yes   Pertinent history of current vestibular problem (include personal factors and/or comorbidities that impact the POC)  ADHD;Depression;Hearing loss;Learning disability;Migraines;Motion sickness;Prior concussion(s)   Hearing Loss Comments no hearing aides   Pertinent history of current problem (include personal factors and/or comorbidities that impact the POC) States a month before she saw  SHe had vertigo when se woke up. Almost fell at that point and vomitted.  She states vomitting has been off and on x 5 times. Prior denies any issues with infection and states she does have allergies.   Pertinent Visual History  cheaters   Prior level of function comment had this issue a couple years ago . Prior level is independent.   Previous/Current Treatment Medication(s)   Improvement after medication Moderate   Patient role/Employment history Disabled   Living environment Apartment/condo   Home/Community Accessibility Comments no stairs and drives   Patient/Family Goals Statement to decrease her dizziness or get rid of it   Fall Risk Screen   Fall screen completed by PT   Per patient - Fall 2 or more times in past year? Yes   Per patient - Fall with injury in past year? No   Is patient a fall risk? No  (will continue to evaluate)   Pain   Patient currently in pain No   Cognitive Status Examination   Orientation orientation to person, place and time   Level of Consciousness alert   Follows Commands and Answers Questions 100% of the time   Personal Safety and Judgment intact   Memory intact   Observation   Observation no distress but right leg  shaking as she is siting her with me   Integumentary   Integumentary No deficits were identified   Integumentary Comments complains of intermittent right sided jaw pain   Posture   Posture Forward head position;Protracted shoulders   Range of Motion (ROM)   ROM Comment WFL uppers, lowers, neck, TMJ slight deviation to right    Strength   Strength Comments strength grossly 5/5 uppers, lowers, postural is 3/5   Bed Mobility   Bed Mobility Comments independent with supine to sit and rolling, no vertigo today   Transfer Skills   Transfer Comments independent with sit to stand and pivot   Gait   Gait Comments WFL   Balance   Balance Comments WFL   Muscle Tone   Muscle Tone no deficits were identified   Oculomotor Exam   Smooth Pursuit Normal   Saccades Normal   VOR Normal   VOR Cancellation Normal   Rapid Head Thrust Corrective Saccade R head thrust   Rapid Head Thrust Comments very small   Convergence Testing Normal   Infrared Goggle Exam or Frenzel Lense Exam   Vestibular Suppressant in Last 24 Hours? No   Exam completed with Frenzel Lenses   Spontaneous Nystagmus Negative   Gaze Evoked Nystagmus Negative   Head Shake Horizontal Nystagmus Negative   Positional testing Negative   [place]-Hallpike (right) Negative   [place]-Hallpike (Left) Negative   [facility] Supine Roll Test (Right) Negative   [facility] Supine Roll Test (Left) Negative   Planned Therapy Interventions   Planned Therapy Interventions neuromuscular re-education   Planned Therapy Interventions Comment postural correction and TMJ education for controlled opening    Clinical Impression   Criteria for Skilled Therapeutic Interventions Met yes, treatment indicated   PT Diagnosis poor posture with slight decreased VOR head thrust   Influenced by the following impairments poor posture    Functional limitations due to impairments quick head motions and jaw pain/ear pain   Clinical Presentation Stable/Uncomplicated   Clinical Presentation Rationale clinical obs   Clinical Decision  Making (Complexity) Low complexity   Therapy Frequency 1 time/week   Predicted Duration of Therapy Intervention (days/wks) 4 weeks   Risk & Benefits of therapy have been explained Yes   Patient, Family & other staff in agreement with plan of care Yes   Clinical Impression Comments Client presents with poor posture with slight right deviation with jaw and very slight head thrust, not intterferring with function.   Education Assessment   Preferred Learning Style Listening;Reading;Demonstration;Pictures/video   Barriers to Learning No barriers   Goal 1   Goal Identifier functional/long   Goal Description Client will be independent with HEP and be free of dizziness guille she is moving to right.   Target Date 10/27/17   Total Evaluation Time   Total Evaluation Time (Minutes) 30   Therapy Certification   Certification date from 09/26/17   Certification date to 10/27/17   Medical Diagnosis right vertigo   Certification I certify the need for these services furnished under this plan of treatment and while under my care.  (Physician co-signature of this document indicates review and certification of the therapy plan).

## 2017-10-05 NOTE — PROGRESS NOTES
09/26/17 1135   Quick Adds   Quick Adds Certification;Vestibular Eval   Type of Visit Initial OP PT Evaluation   General Information   Start of Care Date 09/26/17   Referring Physician DR. Peterson   Orders Evaluate and Treat as Indicated   Order Date 09/12/17   Medical Diagnosis vertigo   Onset of illness/injury or Date of Surgery 08/14/17   Surgical/Medical history reviewed Yes   Pertinent history of current vestibular problem (include personal factors and/or comorbidities that impact the POC)  ADHD;Depression;Hearing loss;Learning disability;Migraines;Motion sickness;Prior concussion(s)   Hearing Loss Comments no hearing aides   Pertinent history of current problem (include personal factors and/or comorbidities that impact the POC) States a month before she saw  SHe had vertigo when se woke up. Almost fell at that point and vomitted.  She states vomitting has been off and on x 5 times. Prior denies any issues with infection and states she does have allergies.   Pertinent Visual History  cheaters   Prior level of function comment had this issue a couple years ago . Prior level is independent.   Previous/Current Treatment Medication(s)   Improvement after medication Moderate   Patient role/Employment history Disabled   Living environment Apartment/condo   Home/Community Accessibility Comments no stairs and drives   Patient/Family Goals Statement to decrease her dizziness or get rid of it   Fall Risk Screen   Fall screen completed by PT   Per patient - Fall 2 or more times in past year? Yes   Per patient - Fall with injury in past year? No   Is patient a fall risk? No  (will continue to evaluate)   Pain   Patient currently in pain No   Cognitive Status Examination   Orientation orientation to person, place and time   Level of Consciousness alert   Follows Commands and Answers Questions 100% of the time   Personal Safety and Judgment intact   Memory intact   Observation   Observation no distress but right leg  shaking as she is siting her with me   Integumentary   Integumentary No deficits were identified   Integumentary Comments complains of intermittent right sided jaw pain   Posture   Posture Forward head position;Protracted shoulders   Range of Motion (ROM)   ROM Comment WFL uppers, lowers, neck, TMJ slight deviation to right    Strength   Strength Comments strength grossly 5/5 uppers, lowers, postural is 3/5   Bed Mobility   Bed Mobility Comments independent with supine to sit and rolling, no vertigo today   Transfer Skills   Transfer Comments independent with sit to stand and pivot   Gait   Gait Comments WFL   Balance   Balance Comments WFL   Muscle Tone   Muscle Tone no deficits were identified   Oculomotor Exam   Smooth Pursuit Normal   Saccades Normal   VOR Normal   VOR Cancellation Normal   Rapid Head Thrust Corrective Saccade R head thrust   Rapid Head Thrust Comments very small   Convergence Testing Normal   Infrared Goggle Exam or Frenzel Lense Exam   Vestibular Suppressant in Last 24 Hours? No   Exam completed with Frenzel Lenses   Spontaneous Nystagmus Negative   Gaze Evoked Nystagmus Negative   Head Shake Horizontal Nystagmus Negative   Positional testing Negative   [place]-Hallpike (right) Negative   [place]-Hallpike (Left) Negative   [facility] Supine Roll Test (Right) Negative   [facility] Supine Roll Test (Left) Negative   Planned Therapy Interventions   Planned Therapy Interventions neuromuscular re-education   Planned Therapy Interventions Comment postural correction and TMJ education for controlled opening    Clinical Impression   Criteria for Skilled Therapeutic Interventions Met yes, treatment indicated   PT Diagnosis poor posture with slight decreased VOR head thrust   Influenced by the following impairments poor posture    Functional limitations due to impairments quick head motions and jaw pain/ear pain   Clinical Presentation Stable/Uncomplicated   Clinical Presentation Rationale clinical obs   Clinical Decision  Making (Complexity) Low complexity   Therapy Frequency 1 time/week   Predicted Duration of Therapy Intervention (days/wks) 4 weeks   Risk & Benefits of therapy have been explained Yes   Patient, Family & other staff in agreement with plan of care Yes   Clinical Impression Comments Client presents with poor posture with slight right deviation with jaw and very slight head thrust, not intterferring with function.   Education Assessment   Preferred Learning Style Listening;Reading;Demonstration;Pictures/video   Barriers to Learning No barriers   Goal 1   Goal Identifier functional/long   Goal Description Client will be independent with HEP and be free of dizziness guille she is moving to right.   Target Date 10/27/17   Total Evaluation Time   Total Evaluation Time (Minutes) 30   Therapy Certification   Certification date from 09/26/17   Certification date to 10/27/17   Medical Diagnosis right vertigo   Certification I certify the need for these services furnished under this plan of treatment and while under my care.  (Physician co-signature of this document indicates review and certification of the therapy plan).

## 2017-10-13 DIAGNOSIS — R60.9 EDEMA, UNSPECIFIED TYPE: ICD-10-CM

## 2017-10-16 RX ORDER — FUROSEMIDE 20 MG
TABLET ORAL
Qty: 60 TABLET | Refills: 0 | Status: SHIPPED | OUTPATIENT
Start: 2017-10-16 | End: 2017-10-17

## 2017-10-17 ENCOUNTER — OFFICE VISIT (OUTPATIENT)
Dept: OTOLARYNGOLOGY | Facility: OTHER | Age: 57
End: 2017-10-17
Attending: PHYSICIAN ASSISTANT
Payer: MEDICARE

## 2017-10-17 ENCOUNTER — OFFICE VISIT (OUTPATIENT)
Dept: UROLOGY | Facility: OTHER | Age: 57
End: 2017-10-17
Attending: NURSE PRACTITIONER
Payer: MEDICARE

## 2017-10-17 VITALS
OXYGEN SATURATION: 92 % | WEIGHT: 276 LBS | DIASTOLIC BLOOD PRESSURE: 64 MMHG | HEART RATE: 77 BPM | HEIGHT: 62 IN | SYSTOLIC BLOOD PRESSURE: 100 MMHG | BODY MASS INDEX: 50.79 KG/M2 | TEMPERATURE: 98.4 F

## 2017-10-17 VITALS
SYSTOLIC BLOOD PRESSURE: 112 MMHG | BODY MASS INDEX: 48.55 KG/M2 | TEMPERATURE: 97.9 F | HEART RATE: 78 BPM | DIASTOLIC BLOOD PRESSURE: 60 MMHG | HEIGHT: 63 IN | WEIGHT: 274 LBS

## 2017-10-17 DIAGNOSIS — H90.3 SENSORINEURAL HEARING LOSS (SNHL) OF BOTH EARS: ICD-10-CM

## 2017-10-17 DIAGNOSIS — N39.46 MIXED STRESS AND URGE URINARY INCONTINENCE: Primary | ICD-10-CM

## 2017-10-17 DIAGNOSIS — N39.46 MIXED INCONTINENCE URGE AND STRESS (MALE)(FEMALE): ICD-10-CM

## 2017-10-17 DIAGNOSIS — M26.609 TMJ (TEMPOROMANDIBULAR JOINT SYNDROME): ICD-10-CM

## 2017-10-17 DIAGNOSIS — H81.10 BPPV (BENIGN PAROXYSMAL POSITIONAL VERTIGO), UNSPECIFIED LATERALITY: ICD-10-CM

## 2017-10-17 DIAGNOSIS — N32.81 OAB (OVERACTIVE BLADDER): ICD-10-CM

## 2017-10-17 DIAGNOSIS — R42 VERTIGO: Primary | ICD-10-CM

## 2017-10-17 PROCEDURE — 92504 EAR MICROSCOPY EXAMINATION: CPT

## 2017-10-17 PROCEDURE — 51798 US URINE CAPACITY MEASURE: CPT

## 2017-10-17 PROCEDURE — 99213 OFFICE O/P EST LOW 20 MIN: CPT | Mod: 25

## 2017-10-17 PROCEDURE — 92504 EAR MICROSCOPY EXAMINATION: CPT | Performed by: PHYSICIAN ASSISTANT

## 2017-10-17 PROCEDURE — 99213 OFFICE O/P EST LOW 20 MIN: CPT | Performed by: NURSE PRACTITIONER

## 2017-10-17 PROCEDURE — 99212 OFFICE O/P EST SF 10 MIN: CPT

## 2017-10-17 PROCEDURE — 99213 OFFICE O/P EST LOW 20 MIN: CPT | Mod: 25 | Performed by: PHYSICIAN ASSISTANT

## 2017-10-17 RX ORDER — MIRABEGRON 50 MG/1
50 TABLET, EXTENDED RELEASE ORAL DAILY
Qty: 90 TABLET | Refills: 3 | Status: SHIPPED | OUTPATIENT
Start: 2017-10-17 | End: 2018-08-28

## 2017-10-17 ASSESSMENT — PAIN SCALES - GENERAL
PAINLEVEL: NO PAIN (0)
PAINLEVEL: NO PAIN (0)

## 2017-10-17 NOTE — PATIENT INSTRUCTIONS
Please continue to take the Myrbetriq.  I have reordered it.  Please come back to see me in a year, or sooner if you have problems.

## 2017-10-17 NOTE — PROGRESS NOTES
CC: Mixed urinary incontinence.    HPI: Ms. Maite Suero is a very pleasant 55 year old female who presents to the urology clinic today with complaints of mixed urinary incontinence.  I saw her for this a year ago. Her  Myrbetriq was increased from 25 mg to 50 mg daily. She is here today for follow up, and a post void residual.  She has severely limited her caffeine intake and feels that with the extra Myrbetriq and her caffeine restriciton that she is doing better.    Daytime frequency: No, 4-5 X  Nocturia: No, X1  Urgency: Yes  Dysuria: No  Hematuria: No  Urge incontinence: No  Stress incontinence: Yes, only with a cold  Number of pads per day: none (except when having a cold)  Episodes of incontinence per day: none usually  Sensation of incomplete bladder emptying: No  Strain or Crede to improve bladder emptying: Yes  Effect on life:  Rate from 1-5, with 1 being the least bothersome and 5 the most bothersome: 1    Past Medical History:   Diagnosis Date     Ankle sprain 10/3/2013     Irritable bowel      Major depressive disorder, recurrent episode, unspecified 10/3/2013     Morbid obesity (H)      Other and unspecified hyperlipidemia 10/3/2013     Plantar fasciitis      Unspecified essential hypertension 10/3/2013     Weight gain 10/3/2013     Past Surgical History:   Procedure Laterality Date     cholecystectomy       COLONOSCOPY N/A 10/9/2014    Procedure: COLONOSCOPY;  Surgeon: Vandana Daniels MD;  Location: [facility]     GYN SURGERY       HYSTERECTOMY      fibroids     Current Outpatient Prescriptions   Medication Sig Dispense Refill     metoprolol (LOPRESSOR) 25 MG tablet Take 12.5 mg by mouth daily       Cholecalciferol (VITAMIN D) 2000 UNITS CAPS Take 1 capsule by mouth daily       buPROPion (WELLBUTRIN XL) 300 MG 24 hr tablet Take 450 mg by mouth daily       LamoTRIgine (LAMICTAL PO)        NASONEX 50 MCG/ACT spray USE TWO SPRAYS IN EACH NOSTRIL ONCE DAILY 17 g 6     omeprazole (PRILOSEC) 20 MG CR  capsule TAKE ONE CAPSULE BY MOUTH TWICE DAILY (Patient taking differently: 2 every morning) 60 capsule 6     mirabegron (MYRBETRIQ) 50 MG 24 hr tablet Take 1 tablet (50 mg) by mouth daily 90 tablet 1     ROZEREM 8 MG tablet TAKE ONE TABLET BY MOUTH AT BEDTIME 30 tablet 5     CRESTOR 20 MG tablet TAKE ONE TABLET BY MOUTH ONCE DAILY 90 tablet 3     furosemide (LASIX) 20 MG tablet Take 40 mg by mouth daily       Docusate Sodium (COLACE PO) Take 100 mg by mouth daily 2-3 tablets daily       methylphenidate (RITALIN LA) 20 MG CP24 Take 80 mg by mouth daily       ARIPiprazole (ABILIFY PO) Take 10 mg by mouth daily       Probiotic Product (PROBIOTIC DAILY) CAPS Take by mouth daily       aspirin (ASPIRIN) 81 MG EC tablet Take 81 mg by mouth 2 times daily        sertraline (ZOLOFT) 100 MG tablet Take 2 tablets by mouth daily       [DISCONTINUED] furosemide (LASIX) 20 MG tablet TAKE 2 TABLETS BY MOUTH EVERY MORNING AND 1 TABLET BY MOUTH EVERY DAY AT NOON 60 tablet 0     [DISCONTINUED] sertraline (ZOLOFT) 100 MG tablet Take 100 mg by mouth daily       [DISCONTINUED] BuPROPion HCl (WELLBUTRIN PO) Take 150 mg by mouth daily (with breakfast)       [DISCONTINUED] buPROPion (WELLBUTRIN XL) 300 MG 24 hr tablet Take by mouth every morning       Allergies   Allergen Reactions     Diagnostic X-Ray Materials Anaphylaxis     IVP dyes     Shellfish Allergy Anaphylaxis     Ambien [Zolpidem] Other (See Comments)     Pt states she was baking food in her sleep     Ivp Dye [Contrast Dye]      From 25 years ago, may be formulated differently now     Pork Allergy      Seasonal      Povidone Iodine Rash     Family History   Problem Relation Age of Onset     Psychotic Disorder Mother      Psychotic Disorder Father      Psychotic Disorder Brother      Psychotic Disorder Sister      SOCIAL HISTORY: The patient does not smoke cigarettes, no EtOH, and no illicit drug use.  She is   with six children.  She is not employed, she is on disability  "for depression.    Review Of Systems  Skin: negative  Eyes: glasses  Ears/Nose/Throat: negative  Respiratory: Shortness of breath- with stairs, uses CPAP for sleep apnea  Cardiovascular: occ left chest discomfort  Gastrointestinal: negative  Genitourinary: as above  Musculoskeletal: negative  Neurologic: negative  Psychiatric: depression stable  Hematologic/Lymphatic/Immunologic: negative  Endocrine: negative    PHYSICAL EXAM:   Vital signs:  Temp: 98.4  F (36.9  C) Temp src: Tympanic BP: 100/64 Pulse: 77     SpO2: 92 %     Height: 157.5 cm (5' 2\") Weight: 125.2 kg (276 lb)  Estimated body mass index is 50.48 kg/(m^2) as calculated from the following:    Height as of this encounter: 1.575 m (5' 2\").    Weight as of this encounter: 125.2 kg (276 lb).  GENERAL: Well groomed/well developed/obese female in no acute distress.  RESP: No increased respiratory effort.  CV: RRR, no murmurs/rubs/gallops.  LYMPH: No lower extremity edema.  ABD: Soft, non tender, non distended, no palpable masses.  Normoactive bowel sounds.  MS: Full ROM in extremities.  NEURO: Alert and oriented x 3.  PSYCH: Normal mood and affect, pleasant and agreeable during interview and exam.    RU: Postvoid residual urine volume by ultrasound was 59 cc.    Office Visit on 06/02/2015   Component Date Value Ref Range Status     Sodium 06/02/2015 139  133 - 144 mmol/L Final     Potassium 06/02/2015 4.1  3.4 - 5.3 mmol/L Final     Chloride 06/02/2015 103  94 - 109 mmol/L Final     Carbon Dioxide 06/02/2015 29  20 - 32 mmol/L Final     Anion Gap 06/02/2015 7  3 - 14 mmol/L Final     Glucose 06/02/2015 85  70 - 99 mg/dL Final     Urea Nitrogen 06/02/2015 15  7 - 30 mg/dL Final     Creatinine 06/02/2015 0.96  0.52 - 1.04 mg/dL Final     GFR Estimate 06/02/2015 61  >60 mL/min/1.7m2 Final    Non  GFR Calc     GFR Estimate If Black 06/02/2015 73  >60 mL/min/1.7m2 Final    African American GFR Calc     Calcium 06/02/2015 9.1  8.5 - 10.1 mg/dL Final "     Cholesterol 06/02/2015 144  <200 mg/dL Final    Comment: LDL Cholesterol is the primary guide to therapy.   The NCEP recommends further evaluation of: patients with cholesterol greater   than 200 mg/dL if additional risk factors are present, cholesterol greater   than   240 mg/dL, triglycerides greater than 150 mg/dL, or HDL less than 40 mg/dL.       Triglycerides 06/02/2015 174* 0 - 150 mg/dL Final    Fasting specimen     HDL Cholesterol 06/02/2015 57  >50 mg/dL Final     LDL Cholesterol Calculated 06/02/2015 52  0 - 129 mg/dL Final    Comment: LDL Cholesterol is the primary guide to therapy: LDL-cholesterol goal in high   risk patients is <100 mg/dL and in very high risk patients is <70 mg/dL.       VLDL-Cholesterol 06/02/2015 35* 0 - 30 mg/dL Final     Cholesterol/HDL Ratio 06/02/2015 2.5  0.0 - 5.0 Final     ALT 06/02/2015 46  0 - 50 U/L Final     Vitamin D Deficiency screening 06/02/2015 36  30 - 75 ug/L Final    Comment: Season, race, dietary intake, and treatment affect the concentration of   25-hydroxy-Vitamin D. Values may decrease during winter months and increase   during summer months. Values less than 30 ug/L may indicate Vitamin D   deficiency.   Vitamin D determiniation is routinely performed by an immunoassay specific   for   25 hydroxyvitamin D3.  If an individual is on vitamin D2 (ergocalciferol)   supplementation, please specify 25 OH vitamin D2 and D3 level determination   by   LCMSMS test [id number].       ASSESSMENT and PLAN:    Ms. Maite Suero is a very pleasant 55 year old female with mixed urinary incontinence.  She will continue with the Myrbetriq and will come back to see me in a year, or sooner if she has having more problems.  I have enjoyed participating in the medical care of this very pleasant patient.  Using layterms, and diagrams when needed, I explained the pathophysiology, usual course, potential complications, recommended monitoring, preventive modalities, treatment  rationale, risks, and benefits to Ms. Maite Suero. The patient appeared to understand and all questions were satisfactorily answered.      Darling Osuna  [facility], [facility]  Urology and Wound Care  October 17, 2017

## 2017-10-17 NOTE — PATIENT INSTRUCTIONS
Vertigo resolved. Use exercises as directed    Annual audiogram.   Hearing protection.     Ears look good. There is no fluid or infection.   Caution on TMJ causing ear pain. Follow TMJ instructions      If there are concerns or questions, Call [phone] and ask for nurse    Based on today's history and physical exam, I can find no evidence of middle ear pathology or eustachian tube dysfunction.  At this point my primary diagnosis is of temporomandibular syndrome.  I have discussed the etiology of TMJ, and the reasons why referred pain can mimic symptoms of ear disease, headaches, and even sinusitis.  i have given the patient an instructional sheet of things to be tried at home, as well a referral to a TMJ specialist should it be needed.  Finally, I counseled the patient that should the therapy not help, or should the symptoms change, that they should return to me.

## 2017-10-17 NOTE — PROGRESS NOTES
Chief Complaint   Patient presents with     RECHECK     F/U Vertigo, BPPV, Nausea       She describes 2 months of intermittent spinning sensation, or feeling that floor is moving while walking.  Episodes last minutes with residual unsteadiness for hours  Associated nausea and emesis  She is not driving  Recent onset left otalgia without otorrhea. She has a sensation of dull ache, which has been present for a few months. She did have a dental block with dental work, and noted left side jaw pain.   No chronic otitis media  No flux HL, no bothersome tinnitus or aural fullness  No facial weakness or numbness, no dysphagia   She was changed to Valium, Zofran as needed      MR OF THE INTERNAL AUDITORY CANAL     TECHNIQUE:  Images were obtained axially FLAIR T2-weighted, axially  coronally T1-weighted, coronally T2-weighted, axially and coronally  T1-weighted following gadolinium administration.     REPORT:  The cerebellopontine angle cisterns and internal auditory  canals appear normal.  No abnormal fluid is seen in the middle ear  cavity or mastoids.  Cochlea and labyrinth appear normal bilaterally.  The brainstem and cerebellum are imaged and appear normal.     IMPRESSION:  NORMAL INTERNAL AUDITORY CANALS.  NO EVIDENCE OF  CEREBELLOPONTINE ANGLE MASS.  Exam Date: Jun 03, 2015 04:46:00 PM  Author: JAMEY MCKNIGHT  This report is final and signed  Past Medical History:   Diagnosis Date     Ankle sprain 10/3/2013     Irritable bowel      Major depressive disorder, recurrent episode, unspecified 10/3/2013     Morbid obesity (H)      Other and unspecified hyperlipidemia 10/3/2013     Plantar fasciitis      Unspecified essential hypertension 10/3/2013     Weight gain 10/3/2013        Allergies   Allergen Reactions     Diagnostic X-Ray Materials Anaphylaxis     IVP dyes     Shellfish Allergy Anaphylaxis     Ambien [Zolpidem] Other (See Comments)     Pt states she was baking food in her sleep     Ivp Dye [Contrast Dye]       "From 25 years ago, may be formulated differently now     Pork Allergy      Seasonal      Povidone Iodine Rash     Current Outpatient Prescriptions   Medication     [DISCONTINUED] furosemide (LASIX) 20 MG tablet     metoprolol (LOPRESSOR) 25 MG tablet     Cholecalciferol (VITAMIN D) 2000 UNITS CAPS     buPROPion (WELLBUTRIN XL) 300 MG 24 hr tablet     [DISCONTINUED] sertraline (ZOLOFT) 100 MG tablet     LamoTRIgine (LAMICTAL PO)     [DISCONTINUED] BuPROPion HCl (WELLBUTRIN PO)     NASONEX 50 MCG/ACT spray     omeprazole (PRILOSEC) 20 MG CR capsule     mirabegron (MYRBETRIQ) 50 MG 24 hr tablet     ROZEREM 8 MG tablet     CRESTOR 20 MG tablet     furosemide (LASIX) 20 MG tablet     Docusate Sodium (COLACE PO)     methylphenidate (RITALIN LA) 20 MG CP24     ARIPiprazole (ABILIFY PO)     Probiotic Product (PROBIOTIC DAILY) CAPS     aspirin (ASPIRIN) 81 MG EC tablet     sertraline (ZOLOFT) 100 MG tablet     [DISCONTINUED] buPROPion (WELLBUTRIN XL) 300 MG 24 hr tablet     No current facility-administered medications for this visit.       ROS: 10 point ROS neg other than the symptoms noted above in the HPI.  /60 (BP Location: Left arm, Cuff Size: Adult Large)  Pulse 78  Temp 97.9  F (36.6  C) (Tympanic)  Ht 5' 3\" (1.6 m)  Wt 274 lb (124.3 kg)  BMI 48.54 kg/m2    General - Alert and oriented to person and place, answers questions and cooperates with examination appropriately.  Morbidly obese.  Nervous. Gait appears stable.   Head and Face - Normocephalic and atraumatic, with no gross asymmetry noted.  The facial nerve is intact, with strong symmetric movements.  Voice and Breathing - The patient was breathing comfortably without the use of accessory muscles. There was no wheezing, stridor, or stertor.  The patients voice was clear and strong, and had appropriate pitch and quality.  Ears -examined under microscopy bilaterally  The external auditory canals are patent, the tympanic membranes are intact without " effusion, retraction or mass.  Bony landmarks are intact.  Eyes - Extraocular movements intact, and the pupils were reactive to light.  Sclera were not icteric or injected, conjunctiva were pink and moist. No spontaneous nystagmus  Mouth - Examination of the oral cavity showed pink, healthy oral mucosa. No lesions or ulcerations noted.  The tongue was mobile and midline, and the dentition were in good condition.  Oral tenderness to palpation of masseter.   Throat - The walls of the oropharynx were smooth, pink, moist, symmetric, and had no lesions or ulcerations.  The tonsillar pillars and soft palate were symmetric.  The uvula was midline on elevation.    Neck - Normal midline excursion of the laryngotracheal complex during swallowing.  Full range of motion on passive movement.  Palpation of the occipital, submental, submandibular, internal jugular chain, and supraclavicular nodes did not demonstrate any abnormal lymph nodes or masses.  Palpation of the thyroid was soft and smooth, with no nodules or goiter appreciated.  The trachea was mobile and midline.  Nose - External contour is symmetric, no gross deflection or scars.  Nasal mucosa is pink and moist with no abnormal mucus.  The septum and turbinates were evaluated: non obstructive.  No polyps, masses, or purulence noted on examination.    ASSESSMENT:    ICD-10-CM    1. Vertigo R42    2. BPPV (benign paroxysmal positional vertigo), unspecified laterality H81.10    3. Sensorineural hearing loss (SNHL) of both ears H90.3    4. TMJ (temporomandibular joint syndrome) M26.609        Annual audiogram  Prior MRI of IAC was normal.     Vertigo resolved. Use exercises as directed    Annual audiogram.   Hearing protection.     Ears look good. There is no fluid or infection.   Caution on TMJ causing ear pain. Follow TMJ instructions    TMJ disease can usually be managed conservatively.  Recommendations include resting the muscles and joints by eating soft foods, not  chewing gum, avoiding clenching or tensing the jaw, and relaxing muscles with moist heat for 1/2 hour at least, twice daily.  Physical therapy can be helpful as well as non-steroidal anti-inflammatory drugs.  Oral splints or mouth guards are often necessary.  If these steps are not helpful an oral surgeon may need to be contacted.      Alycia Armenta PA-C  ENT  [facility]  [phone]

## 2017-10-17 NOTE — NURSING NOTE
"Chief Complaint   Patient presents with     Urinary Problem     1 yr f/u OAB       Initial /64 (BP Location: Left arm, Cuff Size: Adult Large)  Pulse 77  Temp 98.4  F (36.9  C) (Tympanic)  Ht 1.575 m (5' 2\")  Wt 125.2 kg (276 lb)  SpO2 92%  BMI 50.48 kg/m2 Estimated body mass index is 50.48 kg/(m^2) as calculated from the following:    Height as of this encounter: 1.575 m (5' 2\").    Weight as of this encounter: 125.2 kg (276 lb).  Medication Reconciliation: complete   Anastasia Lindsey LPN      "

## 2017-10-17 NOTE — NURSING NOTE
"Chief Complaint   Patient presents with     RECHECK     F/U Vertigo, BPPV, Nausea.  Pt was placed on Zofran, Valium, and ordered PT.  Pt has had no further problems.       Initial /60 (BP Location: Left arm, Cuff Size: Adult Large)  Pulse 78  Temp 97.9  F (36.6  C) (Tympanic)  Ht 5' 3\" (1.6 m)  Wt 274 lb (124.3 kg)  BMI 48.54 kg/m2 Estimated body mass index is 48.54 kg/(m^2) as calculated from the following:    Height as of this encounter: 5' 3\" (1.6 m).    Weight as of this encounter: 274 lb (124.3 kg).  Medication Reconciliation: complete   Felisha Montaño LPN      "

## 2017-10-17 NOTE — MR AVS SNAPSHOT
After Visit Summary   10/17/2017    Maite Suero    MRN: [id number]           Patient Information     Date Of Birth          1960        Visit Information        Provider Department      10/17/2017 1:45 PM Alycia Armenta PA-C [facility]        Care Instructions    Vertigo resolved. Use exercises as directed    Annual audiogram.   Hearing protection.     Ears look good. There is no fluid or infection.   Caution on TMJ causing ear pain. Follow TMJ instructions      If there are concerns or questions, Call [phone] and ask for nurse    Based on today's history and physical exam, I can find no evidence of middle ear pathology or eustachian tube dysfunction.  At this point my primary diagnosis is of temporomandibular syndrome.  I have discussed the etiology of TMJ, and the reasons why referred pain can mimic symptoms of ear disease, headaches, and even sinusitis.  i have given the patient an instructional sheet of things to be tried at home, as well a referral to a TMJ specialist should it be needed.  Finally, I counseled the patient that should the therapy not help, or should the symptoms change, that they should return to me.          Follow-ups after your visit        Your next 10 appointments already scheduled     Oct 18, 2017  8:30 AM CDT   MA SCREENING DIGITAL BILATERAL with HCMA1   [facility] ([facility] [place] )    [address]  [facility] [address]   [phone]           Do not use any powder, lotion or deodorant under your arms or on your breast. If you do, we will ask you to remove it before your exam.  Wear comfortable, two-piece clothing.  If you have any allergies, tell your care team.  Bring any previous mammograms from other facilities or have them mailed to the breast center. Three-dimensional (3D) mammograms are available at [facility] locations in [place], [place], [place], [place], [facility], [place], [place], and  "[place]. [facility] locations include [facility] in [place]. Benefits of 3D mammograms include: - Improved rate of cancer detection - Decreases your chance of having to go back for more tests, which means fewer: - \"False-positive\" results (This means that there is an abnormal area but it isn't cancer.) - Invasive testing procedures, such as a biopsy or surgery - Can provide clearer images of the breast if you have dense breast tissue. 3D mammography is an optional exam that anyone can have with a 2D mammogram. It doesn't replace or take the place of a 2D mammogram. 2D mammograms remain an effective screening test for all women.  Not all insurance companies cover the cost of a 3D mammogram. Check with your insurance.            Oct 16, 2018  1:00 PM CDT   (Arrive by 12:45 PM)   Return Visit with Darling Osuna NP   [facility] ([facility] )    [address]  [place] [address]   [phone]              Who to contact     If you have questions or need follow up information about today's clinic visit or your schedule please contact [facility] directly at [phone].  Normal or non-critical lab and imaging results will be communicated to you by MyChart, letter or phone within 4 business days after the clinic has received the results. If you do not hear from us within 7 days, please contact the clinic through MyChart or phone. If you have a critical or abnormal lab result, we will notify you by phone as soon as possible.  Submit refill requests through [employer] or call your pharmacy and they will forward the refill request to us. Please allow 3 business days for your refill to be completed.          Additional Information About Your Visit        [employer] Information     [employer] lets you send messages to your doctor, view your test results, renew your prescriptions, schedule appointments and more. To sign up, go to " "[email or web address] . Click on \"Log in\" on the left side of the screen, which will take you to the Welcome page. Then click on \"Sign up Now\" on the right side of the page.     You will be asked to enter the access code listed below, as well as some personal information. Please follow the directions to create your username and password.     Your access code is: [email or web address]  Expires: 12/10/2017 12:51 PM     Your access code will  in 90 days. If you need help or a new code, please call your [facility] clinic or [phone].        Care EveryWhere ID     This is your Care EveryWhere ID. This could be used by other organizations to access your [facility] medical records  [id number]        Your Vitals Were     Pulse Temperature Height BMI (Body Mass Index)          78 97.9  F (36.6  C) (Tympanic) 5' 3\" (1.6 m) 48.54 kg/m2         Blood Pressure from Last 3 Encounters:   10/17/17 112/60   10/17/17 100/64   17 118/74    Weight from Last 3 Encounters:   10/17/17 274 lb (124.3 kg)   10/17/17 276 lb (125.2 kg)   17 275 lb (124.7 kg)              Today, you had the following     No orders found for display         Today's Medication Changes          These changes are accurate as of: 10/17/17  2:00 PM.  If you have any questions, ask your nurse or doctor.               These medicines have changed or have updated prescriptions.        Dose/Directions    omeprazole 20 MG CR capsule   Commonly known as:  priLOSEC   This may have changed:  See the new instructions.   Used for:  Gastroesophageal reflux disease without esophagitis        TAKE ONE CAPSULE BY MOUTH TWICE DAILY   Quantity:  60 capsule   Refills:  6            Where to get your medicines      These medications were sent to [id number]  [address] [facility] AT [facility] OF [address] [address]     Phone:  [phone]     mirabegron 50 MG 24 hr tablet                Primary Care " Provider Office Phone # Fax #    Yessiniru Dorsey [phone] [address] [facility] [address]        Equal Access to Services     EVE GONZALEZ : Aakash Webster, wakraigda jacyannieha, qajohnsonta kafrancisco javierda kamardorothy, michael michaelin hayaashahnaz galvinjan evelynfrankieerasmo muñoz. So [facility] [phone].    ATENCIÓN: Si habla español, tiene a gaviria disposición servicios gratuitos de asistencia lingüística. Llame al [phone].    We comply with applicable federal civil rights laws and [place] laws. We do not discriminate on the basis of race, color, national origin, age, disability, sex, sexual orientation, or gender identity.            Thank you!     Thank you for choosing [facility]  [facility]. Our goal is always to provide you with excellent care. Hearing back from our patients is one way we can continue to improve our services. Please take a few minutes to complete the written survey that you may receive in the mail after your visit with us. Thank you!             Your Updated Medication List - Protect others around you: Learn how to safely use, store and throw away your medicines at www.disposemymeds.org.          This list is accurate as of: 10/17/17  2:00 PM.  Always use your most recent med list.                   Brand Name Dispense Instructions for use Diagnosis    ABILIFY PO      Take 10 mg by mouth daily        aspirin 81 MG EC tablet   Generic drug:  aspirin      Take 81 mg by mouth 2 times daily        buPROPion 300 MG 24 hr tablet    WELLBUTRIN XL     Take 450 mg by mouth daily        COLACE PO      Take 100 mg by mouth daily 2-3 tablets daily        CRESTOR 20 MG tablet   Generic drug:  rosuvastatin     90 tablet    TAKE ONE TABLET BY MOUTH ONCE DAILY    Mixed hyperlipidemia       LAMICTAL PO           LASIX 20 MG tablet   Generic drug:  furosemide      Take 40 mg by mouth daily        metoprolol 25 MG tablet    LOPRESSOR     Take 12.5 mg by mouth daily         mirabegron 50 MG 24 hr tablet    MYRBETRIQ    90 tablet    Take 1 tablet (50 mg) by mouth daily    Mixed incontinence urge and stress (male)(female), OAB (overactive bladder)       NASONEX 50 MCG/ACT spray   Generic drug:  mometasone     17 g    USE TWO SPRAYS IN EACH NOSTRIL ONCE DAILY    Chronic rhinitis       omeprazole 20 MG CR capsule    priLOSEC    60 capsule    TAKE ONE CAPSULE BY MOUTH TWICE DAILY    Gastroesophageal reflux disease without esophagitis       PROBIOTIC DAILY Caps      Take by mouth daily        RITALIN LA 20 MG Cp24   Generic drug:  methylphenidate      Take 80 mg by mouth daily        ROZEREM 8 MG tablet   Generic drug:  ramelteon     30 tablet    TAKE ONE TABLET BY MOUTH AT BEDTIME    Insomnia, unspecified type       sertraline 100 MG tablet    ZOLOFT     Take 2 tablets by mouth daily        vitamin D 2000 UNITS Caps      Take 1 capsule by mouth daily

## 2017-10-17 NOTE — MR AVS SNAPSHOT
"              After Visit Summary   10/17/2017    Maite Suero    MRN: [id number]           Patient Information     Date Of Birth          1960        Visit Information        Provider Department      10/17/2017 1:00 PM Darling Osuna, NP [facility]        Today's Diagnoses     Mixed stress and urge urinary incontinence    -  1    OAB (overactive bladder)        Mixed incontinence urge and stress (male)(female)          Care Instructions    Please continue to take the Myrbetriq.  I have reordered it.  Please come back to see me in a year, or sooner if you have problems.            Follow-ups after your visit        Your next 10 appointments already scheduled     Oct 17, 2017  1:45 PM CDT   (Arrive by 1:30 PM)   Return Visit with Alycia Armenta PA-C   [facility] ([facility] )    [address] [address]   [phone]            Oct 18, 2017  8:30 AM CDT   MA SCREENING DIGITAL BILATERAL with HCMA1   [facility] ([facility] )    [address] [address]   [phone]           Do not use any powder, lotion or deodorant under your arms or on your breast. If you do, we will ask you to remove it before your exam.  Wear comfortable, two-piece clothing.  If you have any allergies, tell your care team.  Bring any previous mammograms from other facilities or have them mailed to the breast center. Three-dimensional (3D) mammograms are available at [place] locations in [facility], [facility], [place], [place], and [place]. [facility] locations include [facility] in [place]. Benefits of 3D mammograms include: - Improved rate of cancer detection - Decreases your chance of having to go back for more tests, which means fewer: - \"False-positive\" results (This means that there is an abnormal area but it isn't cancer.) - Invasive testing " "procedures, such as a biopsy or surgery - Can provide clearer images of the breast if you have dense breast tissue. 3D mammography is an optional exam that anyone can have with a 2D mammogram. It doesn't replace or take the place of a 2D mammogram. 2D mammograms remain an effective screening test for all women.  Not all insurance companies cover the cost of a 3D mammogram. Check with your insurance.              Who to contact     If you have questions or need follow up information about today's clinic visit or your schedule please contact [facility] directly at [phone].  Normal or non-critical lab and imaging results will be communicated to you by VesselVanguardhart, letter or phone within 4 business days after the clinic has received the results. If you do not hear from us within 7 days, please contact the clinic through YouGovt or phone. If you have a critical or abnormal lab result, we will notify you by phone as soon as possible.  Submit refill requests through Zerve or call your pharmacy and they will forward the refill request to us. Please allow 3 business days for your refill to be completed.          Additional Information About Your Visit        MyChart Information     Zerve lets you send messages to your doctor, view your test results, renew your prescriptions, schedule appointments and more. To sign up, go to [email or web address] . Click on \"Log in\" on the left side of the screen, which will take you to the Welcome page. Then click on \"Sign up Now\" on the right side of the page.     You will be asked to enter the access code listed below, as well as some personal information. Please follow the directions to create your username and password.     Your access code is: [email or web address]  Expires: 12/10/2017 12:51 PM     Your access code will  in 90 days. If you need help or a new code, please call your [facility] or [phone].        Care EveryWhere ID     This is your Care " "EveryWhere ID. This could be used by other organizations to access your [place] medical records  [id number]        Your Vitals Were     Pulse Temperature Height Pulse Oximetry BMI (Body Mass Index)       77 98.4  F (36.9  C) (Tympanic) 1.575 m (5' 2\") 92% 50.48 kg/m2        Blood Pressure from Last 3 Encounters:   10/17/17 100/64   09/12/17 118/74   09/08/17 114/72    Weight from Last 3 Encounters:   10/17/17 125.2 kg (276 lb)   09/12/17 124.7 kg (275 lb)   09/08/17 124.7 kg (275 lb)              Today, you had the following     No orders found for display         Today's Medication Changes          These changes are accurate as of: 10/17/17  1:36 PM.  If you have any questions, ask your nurse or doctor.               These medicines have changed or have updated prescriptions.        Dose/Directions    omeprazole 20 MG CR capsule   Commonly known as:  priLOSEC   This may have changed:  See the new instructions.   Used for:  Gastroesophageal reflux disease without esophagitis        TAKE ONE CAPSULE BY MOUTH TWICE DAILY   Quantity:  60 capsule   Refills:  6            Where to get your medicines      These medications were sent to [facility] [address] [place] [address]  AT [facility] [address]  [address] [place] [address] [address] [facility] [address]     Phone:  [phone]     mirabegron 50 MG 24 hr tablet                Primary Care Provider Office Phone # Fax #    Yessi ROSANGELA Dorsey [phone] [address] [facility] [address] [facility] [address]        Equal Access to Services     [facility] Center[facility] : Hadii delmer ku hadasho Soomaali, waaxda luqadaha, qaybta kaalmada adeegyayazan, michael idiin hayaan adeeg kharash la'aan . So [facility] [phone].    ATENCIÓN: Si habla español, tiene a gaviria disposición servicios gratuitos de asistencia lingüística. Llame al [phone].    We comply with applicable federal civil rights laws and [place] laws. We do not discriminate on the basis of race, " color, national origin, age, disability, sex, sexual orientation, or gender identity.            Thank you!     Thank you for choosing [facility] [place]  for your care. Our goal is always to provide you with excellent care. Hearing back from our patients is one way we can continue to improve our services. Please take a few minutes to complete the written survey that you may receive in the mail after your visit with us. Thank you!             Your Updated Medication List - Protect others around you: Learn how to safely use, store and throw away your medicines at www.disposemymeds.org.          This list is accurate as of: 10/17/17  1:36 PM.  Always use your most recent med list.                   Brand Name Dispense Instructions for use Diagnosis    ABILIFY PO      Take 10 mg by mouth daily        aspirin 81 MG EC tablet   Generic drug:  aspirin      Take 81 mg by mouth 2 times daily        buPROPion 300 MG 24 hr tablet    WELLBUTRIN XL     Take 450 mg by mouth daily        COLACE PO      Take 100 mg by mouth daily 2-3 tablets daily        CRESTOR 20 MG tablet   Generic drug:  rosuvastatin     90 tablet    TAKE ONE TABLET BY MOUTH ONCE DAILY    Mixed hyperlipidemia       LAMICTAL PO           LASIX 20 MG tablet   Generic drug:  furosemide      Take 40 mg by mouth daily        metoprolol 25 MG tablet    LOPRESSOR     Take 12.5 mg by mouth daily        mirabegron 50 MG 24 hr tablet    MYRBETRIQ    90 tablet    Take 1 tablet (50 mg) by mouth daily    Mixed incontinence urge and stress (male)(female), OAB (overactive bladder)       NASONEX 50 MCG/ACT spray   Generic drug:  mometasone     17 g    USE TWO SPRAYS IN EACH NOSTRIL ONCE DAILY    Chronic rhinitis       omeprazole 20 MG CR capsule    priLOSEC    60 capsule    TAKE ONE CAPSULE BY MOUTH TWICE DAILY    Gastroesophageal reflux disease without esophagitis       PROBIOTIC DAILY Caps      Take by mouth daily        RITALIN LA 20 MG Cp24   Generic drug:   methylphenidate      Take 80 mg by mouth daily        ROZEREM 8 MG tablet   Generic drug:  ramelteon     30 tablet    TAKE ONE TABLET BY MOUTH AT BEDTIME    Insomnia, unspecified type       sertraline 100 MG tablet    ZOLOFT     Take 2 tablets by mouth daily        vitamin D 2000 UNITS Caps      Take 1 capsule by mouth daily

## 2017-10-18 ENCOUNTER — RADIANT APPOINTMENT (OUTPATIENT)
Dept: MAMMOGRAPHY | Facility: OTHER | Age: 57
End: 2017-10-18
Attending: FAMILY MEDICINE
Payer: MEDICARE

## 2017-10-18 PROCEDURE — G0202 SCR MAMMO BI INCL CAD: HCPCS | Mod: TC

## 2017-10-21 DIAGNOSIS — N39.46 MIXED INCONTINENCE URGE AND STRESS (MALE)(FEMALE): ICD-10-CM

## 2017-10-21 DIAGNOSIS — N32.81 OAB (OVERACTIVE BLADDER): ICD-10-CM

## 2017-10-24 RX ORDER — MIRABEGRON 50 MG/1
TABLET, FILM COATED, EXTENDED RELEASE ORAL
Qty: 30 TABLET | Refills: 0 | OUTPATIENT
Start: 2017-10-24

## 2017-10-24 NOTE — TELEPHONE ENCOUNTER
MYRBETRIQ 50 MG 24 hr tablet      Last Written Prescription Date: 10/17/17  Last Fill Quantity: 30,  # refills: 0   Last Office Visit with [facility], [facility] or [facility] prescribing provider: 9/8/17

## 2017-11-11 DIAGNOSIS — R60.9 EDEMA, UNSPECIFIED TYPE: ICD-10-CM

## 2017-11-16 RX ORDER — FUROSEMIDE 20 MG
TABLET ORAL
Qty: 90 TABLET | Refills: 2 | Status: SHIPPED | OUTPATIENT
Start: 2017-11-16 | End: 2018-02-07

## 2017-11-20 ENCOUNTER — APPOINTMENT (OUTPATIENT)
Dept: CT IMAGING | Facility: HOSPITAL | Age: 57
End: 2017-11-20
Attending: PHYSICIAN ASSISTANT
Payer: MEDICARE

## 2017-11-20 ENCOUNTER — HOSPITAL ENCOUNTER (EMERGENCY)
Facility: HOSPITAL | Age: 57
Discharge: HOME | End: 2017-11-20
Attending: PHYSICIAN ASSISTANT | Admitting: PHYSICIAN ASSISTANT
Payer: MEDICARE

## 2017-11-20 ENCOUNTER — TELEPHONE (OUTPATIENT)
Dept: FAMILY MEDICINE | Facility: OTHER | Age: 57
End: 2017-11-20

## 2017-11-20 VITALS
TEMPERATURE: 97.4 F | WEIGHT: 277 LBS | HEIGHT: 63 IN | SYSTOLIC BLOOD PRESSURE: 119 MMHG | DIASTOLIC BLOOD PRESSURE: 74 MMHG | BODY MASS INDEX: 49.08 KG/M2 | OXYGEN SATURATION: 96 % | RESPIRATION RATE: 20 BRPM

## 2017-11-20 DIAGNOSIS — S09.90XA CLOSED HEAD INJURY, INITIAL ENCOUNTER: ICD-10-CM

## 2017-11-20 PROCEDURE — 99284 EMERGENCY DEPT VISIT MOD MDM: CPT | Mod: 25

## 2017-11-20 PROCEDURE — 99284 EMERGENCY DEPT VISIT MOD MDM: CPT | Performed by: PHYSICIAN ASSISTANT

## 2017-11-20 PROCEDURE — 70450 CT HEAD/BRAIN W/O DYE: CPT | Mod: TC

## 2017-11-20 RX ORDER — POLYMYXIN B SULFATE AND TRIMETHOPRIM 1; 10000 MG/ML; [USP'U]/ML
1 SOLUTION OPHTHALMIC 4 TIMES DAILY
Qty: 2 ML | Refills: 0 | Status: SHIPPED | OUTPATIENT
Start: 2017-11-20 | End: 2017-11-27

## 2017-11-20 ASSESSMENT — ENCOUNTER SYMPTOMS
ABDOMINAL PAIN: 0
DIZZINESS: 0
NAUSEA: 1
NUMBNESS: 0
NECK PAIN: 0
SHORTNESS OF BREATH: 0
LIGHT-HEADEDNESS: 0
VOMITING: 0
HEADACHES: 1
BACK PAIN: 0
WEAKNESS: 0

## 2017-11-20 NOTE — TELEPHONE ENCOUNTER
10:15 AM    Reason for Call: OVERBOOK    Patient is having the following symptoms: conjunctivitis and fell last night and hit her headThe patient is requesting an appointment for  sarina today    Was an appointment offered for this call? No  If yes : Appointment type              Date    Preferred method for responding to this message: Telephone Call  What is your phone number ?[phone]    If we cannot reach you directly, may we leave a detailed response at the number you provided? Yes    Can this message wait until your PCP/provider returns, if unavailable today? No,     Lara Sarah

## 2017-11-20 NOTE — TELEPHONE ENCOUNTER
"Called patient back. Fell last night and hit a metal shelve. Bump and bruise across half of forehead from hairline down to eyebrow that reaches  Temple.Small \"scratch\".Headache 4/10.Denies blurred vision in left eye.Vision is blurred from \"pink eye\" per patient. Advised ER/UC care.Updated patient MD will be contacted with further recommendations. Please advise.Thank you  "

## 2017-11-20 NOTE — DISCHARGE INSTRUCTIONS
Rest and stay hydrated.    Follow-up in the clinic as needed.     You are welcome to return here for any worsening symptoms or other concerns.

## 2017-11-20 NOTE — ED AVS SNAPSHOT
[facility]    [address]    [facility] [address]    Phone:  [phone]                                       Maite Suero   MRN: [id number]    Department:  [facility]   Date of Visit:  11/20/2017           Patient Information     Date Of Birth          1960        Your diagnoses for this visit were:     Closed head injury, initial encounter        You were seen by Wesley Garsia PA-C.      Follow-up Information     Follow up with Yessi Dorsey MD.    Specialty:  Family Practice    Why:  As needed    Contact information:    [address] [place] [address]  [phone]          Follow up with [facility].    Specialty:  EMERGENCY MEDICINE    Why:  If symptoms worsen    Contact information:    [address]  [facility] [address] [phone]    Additional information:    From [facility]: Take [address]. Turn left at [address]. Turn left at the first stoplight on [address]. At the first stop sign, take a right onto [address]. Take a left into the parking lot and continue through until you reach the North enterance of the building.       From [place]: Take [address]. Take the [address] ramp towards [place]. Turn left onto [address]. Take a slight right onto [address]. Turn left at the first stoplight on [address]. At the first stop sign, take a right onto [address]. Take a left into the parking lot and continue through until you reach the North enterance of the building.       From [place]: Take [address]. Take a right at [address]. At the first stop sign, take a right onto [address]. Take a left into the parking lot and continue through until you reach the North enterance of the building.         Discharge Instructions       Rest and stay hydrated.    Follow-up in the clinic as needed.     You are welcome to return here  for any worsening symptoms or other concerns.     Discharge References/Attachments     MILD TRAUMATIC BRAIN INJURY (CONCUSSION)? WHAT IS (ENGLISH)    HEMATOMA (ENGLISH)      Future Appointments        Provider Department Dept Phone Center    10/16/2018 1:00 PM Malick Rain [facility] [phone] [place]    10/16/2018 1:30 PM Darling Ham NP [facility] [phone] [facility]         Review of your medicines      START taking        Dose / Directions Last dose taken    trimethoprim-polymyxin b ophthalmic solution   Commonly known as:  POLYTRIM   Dose:  1 drop   Quantity:  2 mL        Place 1 drop into the right eye 4 times daily for 7 days   Refills:  0          CONTINUE these medicines which may have CHANGED, or have new prescriptions. If we are uncertain of the size of tablets/capsules you have at home, strength may be listed as something that might have changed.        Dose / Directions Last dose taken    furosemide 20 MG tablet   Commonly known as:  LASIX   What changed:  See the new instructions.   Quantity:  90 tablet        TAKE 2 TABLETS BY MOUTH EVERY MORNING AND 1 TABLET BY MOUTH EVERY DAY AT NOON   Refills:  2        omeprazole 20 MG CR capsule   Commonly known as:  priLOSEC   What changed:  See the new instructions.   Quantity:  60 capsule        TAKE ONE CAPSULE BY MOUTH TWICE DAILY   Refills:  6          Our records show that you are taking the medicines listed below. If these are incorrect, please call your family doctor or clinic.        Dose / Directions Last dose taken    ABILIFY PO   Dose:  10 mg        Take 10 mg by mouth daily   Refills:  0        aspirin 81 MG EC tablet   Dose:  81 mg   Generic drug:  aspirin        Take 81 mg by mouth daily   Refills:  0        buPROPion 300 MG 24 hr tablet   Commonly known as:  WELLBUTRIN XL   Dose:  450 mg        Take 450 mg by mouth daily   Refills:  0        COLACE PO   Dose:  100 mg        Take 100 mg by  mouth daily 2-3 tablets daily   Refills:  0        CRESTOR 20 MG tablet   Quantity:  90 tablet   Generic drug:  rosuvastatin        TAKE ONE TABLET BY MOUTH ONCE DAILY   Refills:  3        LAMICTAL PO        Refills:  0        mirabegron 50 MG 24 hr tablet   Commonly known as:  MYRBETRIQ   Dose:  50 mg   Quantity:  90 tablet        Take 1 tablet (50 mg) by mouth daily   Refills:  3        NASONEX 50 MCG/ACT spray   Quantity:  17 g   Generic drug:  mometasone        USE TWO SPRAYS IN EACH NOSTRIL ONCE DAILY   Refills:  6        PROBIOTIC DAILY Caps        Take by mouth daily   Refills:  0        RITALIN LA 20 MG Cp24   Dose:  80 mg   Generic drug:  methylphenidate        Take 80 mg by mouth daily   Refills:  0        ROZEREM 8 MG tablet   Quantity:  30 tablet   Generic drug:  ramelteon        TAKE ONE TABLET BY MOUTH AT BEDTIME   Refills:  5        sertraline 100 MG tablet   Commonly known as:  ZOLOFT   Dose:  2 tablet        Take 2 tablets by mouth daily   Refills:  0        vitamin D 2000 UNITS Caps   Dose:  1 capsule        Take 1 capsule by mouth daily   Refills:  0                Prescriptions were sent or printed at these locations (1 Prescription)                   [facility] [id number] [place] [address] [address] AT [facility] [address]   [address] [facility] [address]    Telephone:  [phone]   Fax:  [phone]   Hours:                  E-Prescribed (1 of 1)         trimethoprim-polymyxin b (POLYTRIM) ophthalmic solution                Procedures and tests performed during your visit     CT Head w/o Contrast      Orders Needing Specimen Collection     None      Pending Results     No orders found from 11/18/2017 to 11/21/2017.            Pending Culture Results     No orders found from 11/18/2017 to 11/21/2017.            Thank you for choosing [facility]       Thank you for choosing [facility] for your care. Our goal is always to provide you with excellent care. Hearing back  "from our patients is one way we can continue to improve our services. Please take a few minutes to complete the written survey that you may receive in the mail after you visit with us. Thank you!        [facility] Information     DoveConviene lets you send messages to your doctor, view your test results, renew your prescriptions, schedule appointments and more. To sign up, go to [email or web address] . Click on \"Log in\" on the left side of the screen, which will take you to the Welcome page. Then click on \"Sign up Now\" on the right side of the page.     You will be asked to enter the access code listed below, as well as some personal information. Please follow the directions to create your username and password.     Your access code is: [email or web address]  Expires: 12/10/2017 11:51 AM     Your access code will  in 90 days. If you need help or a new code, please call your [facility] clinic or [phone].        Care EveryWhere ID     This is your Care EveryWhere ID. This could be used by other organizations to access your [facility] medical records  [id number]        Equal Access to Services     EVE GONZALEZ : Hadmorgan Webster, yves ramon, jaylon jackson, michael abdi . So [facility] [phone].    ATENCIÓN: Si habla español, tiene a gaviria disposición servicios gratuitos de asistencia lingüística. Olegario al [phone].    We comply with applicable federal civil rights laws and [place] laws. We do not discriminate on the basis of race, color, national origin, age, disability, sex, sexual orientation, or gender identity.            After Visit Summary       This is your record. Keep this with you and show to your community pharmacist(s) and doctor(s) at your next visit.                  "

## 2017-11-20 NOTE — PROGRESS NOTES
Met with Maite.  She lives in an apartment with her , Abad and adult son, Chester.  They share household chores.  She states that she has been feeling unsteady for a few months but believes that it has been the vertigo.  She does not use an assistive device and denies additional falls.  She uses a c pap at night.  She enjoys crocheting, line dancing, exercising, and playing guitar.  She has five children and five grandchildren.  She sees Dr. Campo for primary care and was last seen in September.  She goes to [facility] for dental care and was last seen within the last year.  Does not have an eye care provider, list of local providers provided.  She utilizes [facility] for medications.  She denies questions or concerns.

## 2017-11-20 NOTE — ED NOTES
"58 y/o female presents with c/o falling at 0100 and hitting her head on a steel shelf. Patient states she was \"fiddeling to put on my cpap while standing when I tripped and hit my head on the side of a shelf.\" Scabbed laceration noted to R side of forehead - bruising and swelling noted to R side of forehead, bruising noted on R eyelid. Denies LOC at scene. Rates headache 4/10 - denies vomiting, but has felt nauseous. States she feels dizzy \"but I always am dizzy. I see Physical Therapy and do movements to help with it.\" Ice applied on admit.  "

## 2017-11-20 NOTE — ED PROVIDER NOTES
History     Chief Complaint   Patient presents with     Fall     fell last night into stainless steel shelf, no LOC      The history is provided by the patient.     Maite Suero is a 57 year old female who presented to the ED ambulatory for evaluation of headache and nausea after head injury.  Fell and hit her head against a metal shelf this morning.  No LOC.  No neck pain.  Takes a daily ASA.  No other concerns.      Problem List:    Patient Active Problem List    Diagnosis Date Noted     ACP (advance care planning) 10/18/2016     Priority: Medium     Advance Care Planning 10/18/2016: ACP Review of Chart / Resources Provided:  Reviewed chart for advance care plan.  Maite Suero has been provided information and resources to begin or update their advance care plan.  Added by Dmitriy Tidwell             Mixed stress and urge urinary incontinence 10/18/2016     Priority: Medium     OAB (overactive bladder) 07/19/2016     Priority: Medium     Vitamin D deficiency 06/02/2015     Priority: Medium     Dissociative identity disorder 08/07/2014     Priority: Medium     Irritable bowel syndrome 06/14/2014     Priority: Medium     Morbid obesity (H) 06/14/2014     Priority: Medium     GERD (gastroesophageal reflux disease) 04/21/2014     Priority: Medium     Low back pain 04/21/2014     Priority: Medium     Cervicalgia (NECK) 04/21/2014     Priority: Medium     Urinary, incontinence, stress female 04/21/2014     Priority: Medium     Benign essential hypertension 10/03/2013     Priority: Medium     Hyperlipidemia 10/03/2013     Priority: Medium     Major depressive disorder, recurrent episode (H) 10/03/2013     Priority: Medium     Sees outside mental health provider for medications, refuses PHQ-9 here.       Ankle sprain 10/03/2013     Priority: Medium     Weight gain 10/03/2013     Priority: Medium        Past Medical History:    Past Medical History:   Diagnosis Date     Ankle sprain 10/3/2013     Irritable  bowel      Major depressive disorder, recurrent episode, unspecified 10/3/2013     Morbid obesity (H)      Other and unspecified hyperlipidemia 10/3/2013     Plantar fasciitis      Unspecified essential hypertension 10/3/2013     Weight gain 10/3/2013       Past Surgical History:    Past Surgical History:   Procedure Laterality Date     cholecystectomy       COLONOSCOPY N/A 10/9/2014    Procedure: COLONOSCOPY;  Surgeon: Vandaan Daniels MD;  Location: [facility]     GYN SURGERY       HYSTERECTOMY      fibroids       Family History:    Family History   Problem Relation Age of Onset     Psychotic Disorder Mother      Psychotic Disorder Father      Psychotic Disorder Brother      Psychotic Disorder Sister        Social History:  Marital Status:  Legally  [3]  Social History   Substance Use Topics     Smoking status: Never Smoker     Smokeless tobacco: Never Used     Alcohol use No        Medications:      trimethoprim-polymyxin b (POLYTRIM) ophthalmic solution   furosemide (LASIX) 20 MG tablet   mirabegron (MYRBETRIQ) 50 MG 24 hr tablet   Cholecalciferol (VITAMIN D) 2000 UNITS CAPS   buPROPion (WELLBUTRIN XL) 300 MG 24 hr tablet   LamoTRIgine (LAMICTAL PO)   NASONEX 50 MCG/ACT spray   omeprazole (PRILOSEC) 20 MG CR capsule   ROZEREM 8 MG tablet   CRESTOR 20 MG tablet   Docusate Sodium (COLACE PO)   methylphenidate (RITALIN LA) 20 MG CP24   ARIPiprazole (ABILIFY PO)   Probiotic Product (PROBIOTIC DAILY) CAPS   aspirin (ASPIRIN) 81 MG EC tablet   sertraline (ZOLOFT) 100 MG tablet   [DISCONTINUED] furosemide (LASIX) 20 MG tablet         Review of Systems   Respiratory: Negative for shortness of breath.    Cardiovascular: Negative for chest pain.   Gastrointestinal: Positive for nausea. Negative for abdominal pain and vomiting.   Genitourinary: Negative.    Musculoskeletal: Negative for back pain and neck pain.   Skin: Negative.    Neurological: Positive for headaches. Negative for dizziness, syncope, weakness,  "light-headedness and numbness.   Hematological:        Daily ASA         Physical Exam   BP: 126/80  Heart Rate: 72  Temp: 97.4  F (36.3  C)  Resp: 20  Height: 160 cm (5' 3\")  Weight: 125.6 kg (277 lb)  SpO2: 93 %      Physical Exam   Constitutional: She is oriented to person, place, and time. She appears well-developed and well-nourished. No distress.   HENT:   Right Ear: External ear normal.   Left Ear: External ear normal.   Mouth/Throat: Oropharynx is clear and moist.   Large right frontal hematoma    Eyes: EOM are normal. Pupils are equal, round, and reactive to light.   Some minimal erythema on the upper lids   Very mild injected conjunctiva on the right.  Left is normal    Neck: Normal range of motion. Neck supple.   No midline cervical tenderness    Cardiovascular: Normal rate and regular rhythm.    Pulmonary/Chest: Effort normal.   Neurological: She is alert and oriented to person, place, and time.   Skin: Skin is warm and dry.   Psychiatric: She has a normal mood and affect.   Nursing note and vitals reviewed.      ED Course     ED Course     Procedures          Results for orders placed or performed during the hospital encounter of 11/20/17   CT Head w/o Contrast    Narrative    PROCEDURE: CT HEAD W/O CONTRAST     HISTORY: fall and head injury, right frontal; .    COMPARISON: None.    TECHNIQUE: Helical Images were obtained from the skull base to the  vertex. Axial images were reviewed.     FINDINGS: The ventricles and sulci are normal in size. No acute  intracranial hemorrhage, mass effect, or midline shift.    The grey-white matter interface is preserved. No evidence of acute  ischemia.    The calvarium is intact. The mastoid air cells are clear.  The  visualized paranasal sinuses are clear. There is a right frontal scalp  hematoma.      Impression    IMPRESSION:   1. No acute intracranial findings.  2. Right frontal scalp hematoma.      NAKITA SHAW MD        Critical Care time:  none           "     Labs Ordered and Resulted from Time of ED Arrival Up to the Time of Departure from the ED - No data to display    Assessments & Plan (with Medical Decision Making)   CT scan negative.  No neck pain.  No distracting pain. Ms Suero states that she had a mild red eye prior to this happening and is adamant on receiving eye drops possible conjunctivitis.  No red flags or eye pain.  I guess this is reasonable.  Follow-up in the clinic.  Return here for ANY other concerns or questions.     I have reviewed the nursing notes.    I have reviewed the findings, diagnosis, plan and need for follow up with the patient.       New Prescriptions    TRIMETHOPRIM-POLYMYXIN B (POLYTRIM) OPHTHALMIC SOLUTION    Place 1 drop into the right eye 4 times daily for 7 days       Final diagnoses:   Closed head injury, initial encounter       11/20/2017   [facility]     Wesley Garsia PA-C  11/20/17 [id number]

## 2017-11-20 NOTE — ED NOTES
"Patient discharged home at this time. Patient given written and verbal discharge instructions regarding home care, f/u and medications. Patient verbalized understanding of all discharge instructions. Aware RX available at [facility]. Patient refused last set of vitals as \"that blood pressure cuff bruised me.\"    "

## 2017-11-28 DIAGNOSIS — G47.00 INSOMNIA, UNSPECIFIED TYPE: ICD-10-CM

## 2017-11-30 RX ORDER — RAMELTEON 8 MG/1
TABLET, FILM COATED ORAL
Qty: 30 TABLET | Refills: 0 | Status: SHIPPED | OUTPATIENT
Start: 2017-11-30 | End: 2017-12-28

## 2017-11-30 NOTE — TELEPHONE ENCOUNTER
eryn      Last Written Prescription Date: 3/13/17  Last Fill Quantity: 30,  # refills: 5   Last Office Visit with G, P or [facility] prescribing provider: 9/8/17

## 2017-12-28 DIAGNOSIS — G47.00 INSOMNIA, UNSPECIFIED TYPE: ICD-10-CM

## 2017-12-28 RX ORDER — RAMELTEON 8 MG/1
TABLET, FILM COATED ORAL
Qty: 30 TABLET | Refills: 2 | Status: SHIPPED | OUTPATIENT
Start: 2017-12-28 | End: 2018-03-28

## 2017-12-28 NOTE — TELEPHONE ENCOUNTER
rozerem  Last Written Prescription Date: 11/30/17  Last Fill Quantity: 30,  # refills: 0   Last Office Visit with G, [facility] or [facility] prescribing provider: 9/8/17

## 2018-01-21 ENCOUNTER — HEALTH MAINTENANCE LETTER (OUTPATIENT)
Age: 58
End: 2018-01-21

## 2018-02-04 DIAGNOSIS — K21.9 GASTROESOPHAGEAL REFLUX DISEASE WITHOUT ESOPHAGITIS: ICD-10-CM

## 2018-03-02 ENCOUNTER — TELEPHONE (OUTPATIENT)
Dept: FAMILY MEDICINE | Facility: OTHER | Age: 58
End: 2018-03-02

## 2018-03-02 DIAGNOSIS — J31.0 CHRONIC RHINITIS, UNSPECIFIED TYPE: Primary | ICD-10-CM

## 2018-03-02 NOTE — TELEPHONE ENCOUNTER
Nasonex is not covered by drug plan. The preferred alternative is Fluticasone and Flunisolide. Please change medication to one of the preferred meds. Thank you

## 2018-03-05 RX ORDER — FLUTICASONE PROPIONATE 50 MCG
1-2 SPRAY, SUSPENSION (ML) NASAL DAILY
Qty: 1 BOTTLE | Refills: 3 | Status: SHIPPED | OUTPATIENT
Start: 2018-03-05 | End: 2018-06-21

## 2018-03-06 DIAGNOSIS — R60.9 EDEMA, UNSPECIFIED TYPE: ICD-10-CM

## 2018-03-06 RX ORDER — FUROSEMIDE 20 MG
TABLET ORAL
Qty: 90 TABLET | Refills: 0 | Status: SHIPPED | OUTPATIENT
Start: 2018-03-06 | End: 2018-04-02

## 2018-03-06 NOTE — LETTER
March 6, 2018      [address]  [facility] [address]        Dear Maite,     We are concerned about your health care.  We recently provided you with medication refills.  Lab tests (Comprehensive Metabolic Panel) are required every 12 months in order to continue refilling your Lasix.  Orders have been placed in our computer and should be accessible at most [facility] labs. You WILL NOT need to be fasting for this lab. Please complete this as soon as possible. If you have any questions please call us at [phone].        Sincerely,        Yessi Dorsey MD

## 2018-03-06 NOTE — TELEPHONE ENCOUNTER
Lasix  Last Written Prescription Date: 2/7/18  Last Fill Quantity: 90 # of Refills: 0  Last Office Visit: 9/8/17

## 2018-03-09 ENCOUNTER — OFFICE VISIT (OUTPATIENT)
Dept: FAMILY MEDICINE | Facility: OTHER | Age: 58
End: 2018-03-09
Attending: FAMILY MEDICINE
Payer: MEDICARE

## 2018-03-09 VITALS
RESPIRATION RATE: 18 BRPM | HEIGHT: 63 IN | BODY MASS INDEX: 48.2 KG/M2 | SYSTOLIC BLOOD PRESSURE: 112 MMHG | OXYGEN SATURATION: 95 % | WEIGHT: 272 LBS | TEMPERATURE: 98.1 F | HEART RATE: 90 BPM | DIASTOLIC BLOOD PRESSURE: 68 MMHG

## 2018-03-09 DIAGNOSIS — L76.82 PAIN AT SURGICAL INCISION: ICD-10-CM

## 2018-03-09 DIAGNOSIS — I10 BENIGN ESSENTIAL HYPERTENSION: ICD-10-CM

## 2018-03-09 DIAGNOSIS — Z00.00 ROUTINE GENERAL MEDICAL EXAMINATION AT A HEALTH CARE FACILITY: Primary | ICD-10-CM

## 2018-03-09 DIAGNOSIS — E78.5 HYPERLIPIDEMIA, UNSPECIFIED HYPERLIPIDEMIA TYPE: ICD-10-CM

## 2018-03-09 LAB
ALT SERPL W P-5'-P-CCNC: 27 U/L (ref 0–50)
ANION GAP SERPL CALCULATED.3IONS-SCNC: 10 MMOL/L (ref 3–14)
BUN SERPL-MCNC: 14 MG/DL (ref 7–30)
CALCIUM SERPL-MCNC: 8.8 MG/DL (ref 8.5–10.1)
CHLORIDE SERPL-SCNC: 105 MMOL/L (ref 94–109)
CHOLEST SERPL-MCNC: 154 MG/DL
CO2 SERPL-SCNC: 24 MMOL/L (ref 20–32)
CREAT SERPL-MCNC: 0.99 MG/DL (ref 0.52–1.04)
EST. AVERAGE GLUCOSE BLD GHB EST-MCNC: 108 MG/DL
GFR SERPL CREATININE-BSD FRML MDRD: 58 ML/MIN/1.7M2
GLUCOSE SERPL-MCNC: 89 MG/DL (ref 70–99)
HBA1C MFR BLD: 5.4 % (ref 4.3–6)
HDLC SERPL-MCNC: 58 MG/DL
LDLC SERPL CALC-MCNC: 61 MG/DL
NONHDLC SERPL-MCNC: 96 MG/DL
POTASSIUM SERPL-SCNC: 4 MMOL/L (ref 3.4–5.3)
SODIUM SERPL-SCNC: 139 MMOL/L (ref 133–144)
TRIGL SERPL-MCNC: 174 MG/DL

## 2018-03-09 PROCEDURE — 36415 COLL VENOUS BLD VENIPUNCTURE: CPT | Mod: ZL | Performed by: FAMILY MEDICINE

## 2018-03-09 PROCEDURE — 80048 BASIC METABOLIC PNL TOTAL CA: CPT | Mod: ZL | Performed by: FAMILY MEDICINE

## 2018-03-09 PROCEDURE — 83036 HEMOGLOBIN GLYCOSYLATED A1C: CPT | Mod: ZL,GZ | Performed by: FAMILY MEDICINE

## 2018-03-09 PROCEDURE — 80061 LIPID PANEL: CPT | Mod: ZL | Performed by: FAMILY MEDICINE

## 2018-03-09 PROCEDURE — 84460 ALANINE AMINO (ALT) (SGPT): CPT | Mod: ZL | Performed by: FAMILY MEDICINE

## 2018-03-09 PROCEDURE — 99396 PREV VISIT EST AGE 40-64: CPT | Performed by: FAMILY MEDICINE

## 2018-03-09 PROCEDURE — 40000788 ZZHCL STATISTIC ESTIMATED AVERAGE GLUCOSE: Mod: ZL | Performed by: FAMILY MEDICINE

## 2018-03-09 ASSESSMENT — ANXIETY QUESTIONNAIRES
2. NOT BEING ABLE TO STOP OR CONTROL WORRYING: NOT AT ALL
1. FEELING NERVOUS, ANXIOUS, OR ON EDGE: NOT AT ALL
4. TROUBLE RELAXING: SEVERAL DAYS
6. BECOMING EASILY ANNOYED OR IRRITABLE: NOT AT ALL
IF YOU CHECKED OFF ANY PROBLEMS ON THIS QUESTIONNAIRE, HOW DIFFICULT HAVE THESE PROBLEMS MADE IT FOR YOU TO DO YOUR WORK, TAKE CARE OF THINGS AT HOME, OR GET ALONG WITH OTHER PEOPLE: NOT DIFFICULT AT ALL
5. BEING SO RESTLESS THAT IT IS HARD TO SIT STILL: NOT AT ALL
3. WORRYING TOO MUCH ABOUT DIFFERENT THINGS: NOT AT ALL
GAD7 TOTAL SCORE: 1
7. FEELING AFRAID AS IF SOMETHING AWFUL MIGHT HAPPEN: NOT AT ALL

## 2018-03-09 ASSESSMENT — PAIN SCALES - GENERAL: PAINLEVEL: NO PAIN (0)

## 2018-03-09 NOTE — PROGRESS NOTES
"   SUBJECTIVE:   CC: Maite Suero is an 57 year old woman who presents for preventive health visit.     Healthy Habits:    Do you get at least three servings of calcium containing foods daily (dairy, green leafy vegetables, etc.)? yes    Amount of exercise or daily activities, outside of work: 20 min per day    Problems taking medications regularly Yes has to have all in am or forgets them    Medication side effects: Yes dry mouth    Have you had an eye exam in the past two years? no    Do you see a dentist twice per year? no    Do you have sleep apnea, excessive snoring or daytime drowsiness?yes      Hyperlipidemia Follow-Up      Rate your low fat/cholesterol diet?: good    Taking statin?  Yes, no muscle aches from statin    Other lipid medications/supplements?:  none    Hypertension Follow-up      Outpatient blood pressures are not being checked.    Low Salt Diet: no added salt    Patient states she has ongoing pain at surgical scar site from hysterectomy.  She can feel a muscle defect.  She states that her previous OB/Gyn told her \"nothing could be done\".  She notes ongoing pain, and wonders if something can be done.      Today's PHQ-2 Score:   PHQ-2 ( 1999 [employer]) 1/5/2017 6/2/2015   Q1: Little interest or pleasure in doing things 1 0   Q2: Feeling down, depressed or hopeless 0 0   PHQ-2 Score 1 0       Abuse: Current or Past(Physical, Sexual or Emotional)- No  Do you feel safe in your environment - Yes    Social History   Substance Use Topics     Smoking status: Never Smoker     Smokeless tobacco: Never Used     Alcohol use No     If you drink alcohol do you typically have >3 drinks per day or >7 drinks per week? No                     Reviewed orders with patient.  Reviewed health maintenance and updated orders accordingly - Yes  Patient Active Problem List   Diagnosis     Benign essential hypertension     Hyperlipidemia     Major depressive disorder, recurrent episode (H)     Ankle sprain     Weight gain "     GERD (gastroesophageal reflux disease)     Low back pain     Cervicalgia (NECK)     Urinary, incontinence, stress female     Irritable bowel syndrome     Morbid obesity (H)     Dissociative identity disorder     Vitamin D deficiency     OAB (overactive bladder)     ACP (advance care planning)     Mixed stress and urge urinary incontinence     Past Surgical History:   Procedure Laterality Date     cholecystectomy       COLONOSCOPY N/A 10/9/2014    Procedure: COLONOSCOPY;  Surgeon: Vandana Daniels MD;  Location: [facility]     GYN SURGERY       HYSTERECTOMY      YEMI/BSO, no cervix; fibroids       Social History   Substance Use Topics     Smoking status: Never Smoker     Smokeless tobacco: Never Used     Alcohol use No     Family History   Problem Relation Age of Onset     Psychotic Disorder Mother      Psychotic Disorder Father      Psychotic Disorder Brother      Psychotic Disorder Sister          Current Outpatient Prescriptions   Medication Sig Dispense Refill     furosemide (LASIX) 20 MG tablet TAKE 2 TABLETS BY MOUTH EVERY MORNING AND 1 TABLET BY MOUTH EVERY DAY AT NOON (Patient taking differently: 2 tables by mouth every morning) 90 tablet 0     fluticasone (FLONASE) 50 MCG/ACT spray Spray 1-2 sprays into both nostrils daily 1 Bottle 3     omeprazole (PRILOSEC) 20 MG CR capsule TAKE 1 CAPSULE BY MOUTH TWICE DAILY 60 capsule 2     ROZEREM 8 MG tablet TAKE 1 TABLET BY MOUTH EVERY NIGHT AT BEDTIME 30 tablet 2     mirabegron (MYRBETRIQ) 50 MG 24 hr tablet Take 1 tablet (50 mg) by mouth daily 90 tablet 3     Cholecalciferol (VITAMIN D) 2000 UNITS CAPS Take 1 capsule by mouth daily       buPROPion (WELLBUTRIN XL) 300 MG 24 hr tablet Take 450 mg by mouth daily       LamoTRIgine (LAMICTAL PO)        CRESTOR 20 MG tablet TAKE ONE TABLET BY MOUTH ONCE DAILY 90 tablet 3     Docusate Sodium (COLACE PO) Take 100 mg by mouth daily 2-3 tablets daily       methylphenidate (RITALIN LA) 20 MG CP24 Take 80 mg by mouth daily        "ARIPiprazole (ABILIFY PO) Take 10 mg by mouth daily       Probiotic Product (PROBIOTIC DAILY) CAPS Take by mouth daily       aspirin (ASPIRIN) 81 MG EC tablet Take 81 mg by mouth daily        sertraline (ZOLOFT) 100 MG tablet Take 2 tablets by mouth daily       Allergies   Allergen Reactions     Shellfish Allergy Anaphylaxis     Ambien [Zolpidem] Other (See Comments)     Pt states she was baking food in her sleep     Pork Allergy      Seasonal      Povidone Iodine Rash       Patient over age 50, mutual decision to screen reflected in health maintenance.    Pertinent mammograms are reviewed under the imaging tab.  History of abnormal Pap smear: Status post benign hysterectomy. Health Maintenance and Surgical History updated.    Reviewed and updated as needed this visit by clinical staff  Tobacco  Allergies  Meds  Problems         Reviewed and updated as needed this visit by Provider            ROS:  C: NEGATIVE for fever, chills, change in weight  I: NEGATIVE for worrisome rashes, moles or lesions  E: NEGATIVE for vision changes or irritation  ENT: NEGATIVE for ear, mouth and throat problems  R: NEGATIVE for significant cough or SOB  B: NEGATIVE for masses, tenderness or discharge  CV: NEGATIVE for chest pain, palpitations or peripheral edema  GI: NEGATIVE for nausea, abdominal pain, heartburn, or change in bowel habits  : NEGATIVE for unusual urinary or vaginal symptoms. No vaginal bleeding.  M: NEGATIVE for significant arthralgias or myalgia  N: NEGATIVE for weakness, dizziness or paresthesias  P: NEGATIVE for changes in mood or affect     OBJECTIVE:   /68 (BP Location: Right arm, Patient Position: Chair, Cuff Size: Adult Large)  Pulse 90  Temp 98.1  F (36.7  C) (Tympanic)  Resp 18  Ht 5' 3\" (1.6 m)  Wt 272 lb (123.4 kg)  SpO2 95%  BMI 48.18 kg/m2  EXAM:  GENERAL: healthy, alert and no distress  EYES: Eyes grossly normal to inspection, PERRL and conjunctivae and sclerae normal  HENT: ear canals " "and TM's normal, nose and mouth without ulcers or lesions  NECK: no adenopathy  RESP: lungs clear to auscultation - no rales, rhonchi or wheezes  BREAST: normal without masses, tenderness or nipple discharge and no palpable axillary masses or adenopathy  CV: regular rate and rhythm, normal S1 S2, no S3 or S4, no murmur, click or rub, no peripheral edema and peripheral pulses strong  ABDOMEN: soft, nontender, without hepatosplenomegaly or masses, bowel sounds normal and  section scar well healed, no tenderness; surgical scan near mons, muscle defect palpated with tenderness noted, skin intact   (female): normal female external genitalia, normal urethral meatus , vaginal mucosa pink, moist, well rugated and normal post-hysterectomy exam without masses, no cervix palpated   MS: no gross musculoskeletal defects noted, no edema  SKIN: no suspicious lesions or rashes  NEURO: Normal strength and tone, mentation intact and speech normal  PSYCH: mentation appears normal, affect normal/bright    ASSESSMENT/PLAN:       ICD-10-CM    1. Routine general medical examination at a health care facility Z00.00 Hemoglobin A1c   2. Hyperlipidemia, unspecified hyperlipidemia type E78.5 Lipid Profile     ALT   3. Benign essential hypertension I10 Basic metabolic panel   4. Pain at surgical incision R20.8 OB/GYN REFERRAL       COUNSELING:   Reviewed preventive health counseling, as reflected in patient instructions         reports that she has never smoked. She has never used smokeless tobacco.    Estimated body mass index is 48.18 kg/(m^2) as calculated from the following:    Height as of this encounter: 5' 3\" (1.6 m).    Weight as of this encounter: 272 lb (123.4 kg).   Weight management plan: Discussed healthy diet and exercise guidelines and patient will follow up in 12 months in clinic to re-evaluate.    Counseling Resources:  ATP IV Guidelines  Pooled Cohorts Equation Calculator  Breast Cancer Risk Calculator  FRAX Risk " Assessment  ICSI Preventive Guidelines  Dietary Guidelines for Americans, 2010  USDA's MyPlate  ASA Prophylaxis  Lung CA Screening      Yessi Dorsey MD  [facility]

## 2018-03-09 NOTE — PATIENT INSTRUCTIONS

## 2018-03-09 NOTE — LETTER
March 9, 2018      Maite Suero  [address]  [facility] [address]              Dear Maite,       Results for orders placed or performed in visit on 03/09/18   Lipid Profile   Result Value Ref Range    Cholesterol 154 <200 mg/dL    Triglycerides 174 (H) <150 mg/dL    HDL Cholesterol 58 >49 mg/dL    LDL Cholesterol Calculated 61 <100 mg/dL    Non HDL Cholesterol 96 <130 mg/dL   Hemoglobin A1c   Result Value Ref Range    Hemoglobin A1C 5.4 4.3 - 6.0 %   Basic metabolic panel   Result Value Ref Range    Sodium 139 133 - 144 mmol/L    Potassium 4.0 3.4 - 5.3 mmol/L    Chloride 105 94 - 109 mmol/L    Carbon Dioxide 24 20 - 32 mmol/L    Anion Gap 10 3 - 14 mmol/L    Glucose 89 70 - 99 mg/dL    Urea Nitrogen 14 7 - 30 mg/dL    Creatinine 0.99 0.52 - 1.04 mg/dL    GFR Estimate 58 (L) >60 mL/min/1.7m2    GFR Estimate If Black 70 >60 mL/min/1.7m2    Calcium 8.8 8.5 - 10.1 mg/dL   ALT   Result Value Ref Range    ALT 27 0 - 50 U/L   Estimated Average Glucose   Result Value Ref Range    Estimated Average Glucose 108 mg/dL             Sincerely,        Yessi Dorsey MD

## 2018-03-09 NOTE — NURSING NOTE
"Chief Complaint   Patient presents with     Physical       Initial /68 (BP Location: Right arm, Patient Position: Chair, Cuff Size: Adult Large)  Pulse 90  Temp 98.1  F (36.7  C) (Tympanic)  Resp 18  Ht 5' 3\" (1.6 m)  Wt 272 lb (123.4 kg)  SpO2 95%  BMI 48.18 kg/m2 Estimated body mass index is 48.18 kg/(m^2) as calculated from the following:    Height as of this encounter: 5' 3\" (1.6 m).    Weight as of this encounter: 272 lb (123.4 kg).  Medication Reconciliation: complete   Pamela M Lechevalier LPN      "

## 2018-03-10 ASSESSMENT — PATIENT HEALTH QUESTIONNAIRE - PHQ9: SUM OF ALL RESPONSES TO PHQ QUESTIONS 1-9: 9

## 2018-03-10 ASSESSMENT — ANXIETY QUESTIONNAIRES: GAD7 TOTAL SCORE: 1

## 2018-03-13 ENCOUNTER — APPOINTMENT (OUTPATIENT)
Dept: OCCUPATIONAL MEDICINE | Facility: OTHER | Age: 58
End: 2018-03-13

## 2018-03-13 PROCEDURE — 99000 SPECIMEN HANDLING OFFICE-LAB: CPT

## 2018-03-16 ENCOUNTER — OFFICE VISIT (OUTPATIENT)
Dept: OBGYN | Facility: OTHER | Age: 58
End: 2018-03-16
Attending: FAMILY MEDICINE
Payer: MEDICARE

## 2018-03-16 VITALS
HEART RATE: 84 BPM | TEMPERATURE: 98.1 F | BODY MASS INDEX: 48.55 KG/M2 | DIASTOLIC BLOOD PRESSURE: 70 MMHG | SYSTOLIC BLOOD PRESSURE: 112 MMHG | WEIGHT: 274 LBS | HEIGHT: 63 IN | OXYGEN SATURATION: 95 %

## 2018-03-16 DIAGNOSIS — Z98.890 STATUS POST SCAR REVISION: Primary | ICD-10-CM

## 2018-03-16 DIAGNOSIS — L76.82 PAIN AT SURGICAL INCISION: ICD-10-CM

## 2018-03-16 PROCEDURE — G0463 HOSPITAL OUTPT CLINIC VISIT: HCPCS

## 2018-03-16 PROCEDURE — 99213 OFFICE O/P EST LOW 20 MIN: CPT | Performed by: OBSTETRICS & GYNECOLOGY

## 2018-03-16 ASSESSMENT — PAIN SCALES - GENERAL: PAINLEVEL: NO PAIN (0)

## 2018-03-16 NOTE — NURSING NOTE
"Chief Complaint   Patient presents with     Consult     Pain at surgical site-hysterectomy scar-1994/1995-Dr Dorsey is referring.  Has had the pain for years, clothing can bother it, pressure       Initial /70 (BP Location: Right arm, Patient Position: Chair, Cuff Size: Adult Large)  Pulse 84  Temp 98.1  F (36.7  C) (Tympanic)  Ht 5' 3\" (1.6 m)  Wt 274 lb (124.3 kg)  SpO2 95%  BMI 48.54 kg/m2 Estimated body mass index is 48.54 kg/(m^2) as calculated from the following:    Height as of this encounter: 5' 3\" (1.6 m).    Weight as of this encounter: 274 lb (124.3 kg).  Medication Reconciliation: yajaira ROBINS      "

## 2018-03-16 NOTE — MR AVS SNAPSHOT
"              After Visit Summary   3/16/2018    Maite Suero    MRN: [id number]           Patient Information     Date Of Birth          1960        Visit Information        Provider Department      3/16/2018 2:00 PM Renny Murrell MD [facility]        Today's Diagnoses     Pain at surgical incision           Follow-ups after your visit        Your next 10 appointments already scheduled     Oct 16, 2018  1:00 PM CDT   (Arrive by 12:45 PM)   Hearing Eval with Malick Dawson   [facility] [place] ([facility] [place] )    [address]  [place] [address]   [phone]            Oct 16, 2018  1:30 PM CDT   (Arrive by 1:15 PM)   Return Visit with Darling Osuna NP   [facility] ([facility] [place] )    [address]  [place] [address]   [phone]              Who to contact     If you have questions or need follow up information about today's clinic visit or your schedule please contact [facility] directly at [phone].  Normal or non-critical lab and imaging results will be communicated to you by KlickExhart, letter or phone within 4 business days after the clinic has received the results. If you do not hear from us within 7 days, please contact the clinic through KlickExhart or phone. If you have a critical or abnormal lab result, we will notify you by phone as soon as possible.  Submit refill requests through Departing or call your pharmacy and they will forward the refill request to us. Please allow 3 business days for your refill to be completed.          Additional Information About Your Visit        KlickExhart Information     Departing lets you send messages to your doctor, view your test results, renew your prescriptions, schedule appointments and more. To sign up, go to [email or web address] . Click on \"Log in\" on the left side of the screen, which will take you to the Welcome page. Then click on \"Sign up Now\" on the " "right side of the page.     You will be asked to enter the access code listed below, as well as some personal information. Please follow the directions to create your username and password.     Your access code is: [email or web address]  Expires: 2018  2:21 PM     Your access code will  in 90 days. If you need help or a new code, please call your [place] clinic or [phone].        Care EveryWhere ID     This is your Care EveryWhere ID. This could be used by other organizations to access your [place] medical records  [id number]        Your Vitals Were     Pulse Temperature Height Pulse Oximetry BMI (Body Mass Index)       84 98.1  F (36.7  C) (Tympanic) 5' 3\" (1.6 m) 95% 48.54 kg/m2        Blood Pressure from Last 3 Encounters:   18 112/70   18 112/68   17 119/74    Weight from Last 3 Encounters:   18 274 lb (124.3 kg)   18 272 lb (123.4 kg)   17 277 lb (125.6 kg)              Today, you had the following     No orders found for display         Today's Medication Changes          These changes are accurate as of 3/16/18  2:05 PM.  If you have any questions, ask your nurse or doctor.               These medicines have changed or have updated prescriptions.        Dose/Directions    furosemide 20 MG tablet   Commonly known as:  LASIX   This may have changed:  See the new instructions.   Used for:  Edema, unspecified type        TAKE 2 TABLETS BY MOUTH EVERY MORNING AND 1 TABLET BY MOUTH EVERY DAY AT NOON   Quantity:  90 tablet   Refills:  0                Primary Care Provider Office Phone # Fax #    Yessi Dorsey [phone] [facility] [address]        Equal Access to Services     EVE GONZALEZ AH: Aakash Webster, yves ramon, jaylon gipsonmamichael tong. So [facility] [phone].    ATENCIÓN: Si habla español, tiene a gaviria disposición servicios gratuitos de asistencia " lingüística. Olegario al [phone].    We comply with applicable federal civil rights laws and [place] laws. We do not discriminate on the basis of race, color, national origin, age, disability, sex, sexual orientation, or gender identity.            Thank you!     Thank you for choosing [facility]  for your care. Our goal is always to provide you with excellent care. Hearing back from our patients is one way we can continue to improve our services. Please take a few minutes to complete the written survey that you may receive in the mail after your visit with us. Thank you!             Your Updated Medication List - Protect others around you: Learn how to safely use, store and throw away your medicines at www.disposemymeds.org.          This list is accurate as of 3/16/18  2:05 PM.  Always use your most recent med list.                   Brand Name Dispense Instructions for use Diagnosis    ABILIFY PO      Take 10 mg by mouth daily        aspirin 81 MG EC tablet   Generic drug:  aspirin      Take 81 mg by mouth daily        buPROPion 300 MG 24 hr tablet    WELLBUTRIN XL     Take 450 mg by mouth daily        COLACE PO      Take 100 mg by mouth daily 2-3 tablets daily        CRESTOR 20 MG tablet   Generic drug:  rosuvastatin     90 tablet    TAKE ONE TABLET BY MOUTH ONCE DAILY    Mixed hyperlipidemia       fluticasone 50 MCG/ACT spray    FLONASE    1 Bottle    Spray 1-2 sprays into both nostrils daily    Chronic rhinitis, unspecified type       furosemide 20 MG tablet    LASIX    90 tablet    TAKE 2 TABLETS BY MOUTH EVERY MORNING AND 1 TABLET BY MOUTH EVERY DAY AT NOON    Edema, unspecified type       LAMICTAL PO           mirabegron 50 MG 24 hr tablet    MYRBETRIQ    90 tablet    Take 1 tablet (50 mg) by mouth daily    Mixed incontinence urge and stress (male)(female), OAB (overactive bladder)       omeprazole 20 MG CR capsule    priLOSEC    60 capsule    TAKE 1 CAPSULE BY MOUTH TWICE DAILY     Gastroesophageal reflux disease without esophagitis       PROBIOTIC DAILY Caps      Take by mouth daily        RITALIN LA 20 MG Cp24   Generic drug:  methylphenidate      Take 80 mg by mouth daily        ROZEREM 8 MG tablet   Generic drug:  ramelteon     30 tablet    TAKE 1 TABLET BY MOUTH EVERY NIGHT AT BEDTIME    Insomnia, unspecified type       sertraline 100 MG tablet    ZOLOFT     Take 2 tablets by mouth daily        vitamin D 2000 UNITS Caps      Take 1 capsule by mouth daily

## 2018-03-28 DIAGNOSIS — G47.00 INSOMNIA, UNSPECIFIED TYPE: ICD-10-CM

## 2018-03-28 RX ORDER — RAMELTEON 8 MG/1
TABLET, FILM COATED ORAL
Qty: 30 TABLET | Refills: 0 | Status: SHIPPED | OUTPATIENT
Start: 2018-03-28 | End: 2018-04-26

## 2018-03-28 NOTE — TELEPHONE ENCOUNTER
ROZEREM 8 MG tablet    Last Written Prescription Date:  12/28/17  Last Fill Quantity: 30,   # refills: 2  Last Office Visit: 3/9/18  Future Office visit:        Called to pharmacy.

## 2018-04-17 ENCOUNTER — APPOINTMENT (OUTPATIENT)
Dept: OCCUPATIONAL MEDICINE | Facility: OTHER | Age: 58
End: 2018-04-17

## 2018-04-17 PROCEDURE — 99000 SPECIMEN HANDLING OFFICE-LAB: CPT

## 2018-04-24 ENCOUNTER — OFFICE VISIT (OUTPATIENT)
Dept: SURGERY | Facility: OTHER | Age: 58
End: 2018-04-24
Attending: FAMILY MEDICINE
Payer: MEDICARE

## 2018-04-24 VITALS
OXYGEN SATURATION: 96 % | SYSTOLIC BLOOD PRESSURE: 102 MMHG | HEART RATE: 78 BPM | BODY MASS INDEX: 48.55 KG/M2 | HEIGHT: 63 IN | DIASTOLIC BLOOD PRESSURE: 70 MMHG | WEIGHT: 274 LBS | TEMPERATURE: 97.4 F

## 2018-04-24 DIAGNOSIS — M79.10 MYALGIA: ICD-10-CM

## 2018-04-24 DIAGNOSIS — L76.82 PAIN AT SURGICAL INCISION: Primary | ICD-10-CM

## 2018-04-24 PROCEDURE — 99203 OFFICE O/P NEW LOW 30 MIN: CPT | Performed by: SURGERY

## 2018-04-24 PROCEDURE — G0463 HOSPITAL OUTPT CLINIC VISIT: HCPCS

## 2018-04-24 ASSESSMENT — PAIN SCALES - GENERAL: PAINLEVEL: MILD PAIN (2)

## 2018-04-24 NOTE — PROGRESS NOTES
[facility] Surgery Consultation    CC:  Incisional pain     HPI:  This 57 year old year old female is seen at the request of Dr. Campo for evaluation of incisional pain. She reports that for the last 20 years since her surgery that she has had pain at her incision site that has not gone away. It is a low gnawing pain that is constant. It does seem to get worse at time. She can feel a defect in the repair that she believes is the source of her pain.  She denies any chest pain shortness of breath nausea or vomiting. She has a tardive dyskinesia of just her right arm that she can volitionally stop.      Past Medical History:   Diagnosis Date     Ankle sprain 10/3/2013     Irritable bowel      Major depressive disorder, recurrent episode, unspecified 10/3/2013     Morbid obesity (H)      Other and unspecified hyperlipidemia 10/3/2013     Plantar fasciitis      Unspecified essential hypertension 10/3/2013     Weight gain 10/3/2013       Past Surgical History:   Procedure Laterality Date     cholecystectomy       COLONOSCOPY N/A 10/9/2014    Procedure: COLONOSCOPY;  Surgeon: Vandana Daniels MD;  Location: [facility] OR     GYN SURGERY       HYSTERECTOMY      YEMI/BSO, no cervix; fibroids       Allergies   Allergen Reactions     Shellfish Allergy Anaphylaxis     Ambien [Zolpidem] Other (See Comments)     Pt states she was baking food in her sleep     Pork Allergy      Seasonal      Povidone Iodine Rash       Current Outpatient Prescriptions   Medication     ARIPiprazole (ABILIFY PO)     aspirin (ASPIRIN) 81 MG EC tablet     buPROPion (WELLBUTRIN XL) 300 MG 24 hr tablet     Cholecalciferol (VITAMIN D) 2000 UNITS CAPS     CRESTOR 20 MG tablet     Docusate Sodium (COLACE PO)     fluticasone (FLONASE) 50 MCG/ACT spray     furosemide (LASIX) 20 MG tablet     LamoTRIgine (LAMICTAL PO)     methylphenidate (RITALIN LA) 20 MG CP24     mirabegron (MYRBETRIQ) 50 MG 24 hr tablet     omeprazole (PRILOSEC) 20 MG CR capsule     ROZEREM  "8 MG tablet     sertraline (ZOLOFT) 100 MG tablet     No current facility-administered medications for this visit.        HABITS:    Social History   Substance Use Topics     Smoking status: Never Smoker     Smokeless tobacco: Never Used     Alcohol use No       Family History   Problem Relation Age of Onset     Psychotic Disorder Mother      Psychotic Disorder Father      Psychotic Disorder Brother      Psychotic Disorder Sister        REVIEW OF SYSTEMS:  Ten point review of systems negative except those mentioned in the HPI.     OBJECTIVE:    /70  Pulse 78  Temp 97.4  F (36.3  C) (Tympanic)  Ht 5' 3\" (1.6 m)  Wt 274 lb (124.3 kg)  SpO2 96%  BMI 48.54 kg/m2    GENERAL: Generally appears well, in no distress with appropriate affect.  HEENT:   Sclerae anicteric - No cervical, supra/infraclavciular lymphadenopathy, Respiratory:  No acute distress, no splinting   Cardiovascular:  Regular Rate and Rhythm  Abdomen: Obese abdomen, there is a well healed low transverse scar, just posterior to the scar is area or subcutaneous fat that has a defect in in it. There is no bulge below this with valsalva.   :  deferred  Extremities:  Extremities normal. No deformities, edema, or skin discoloration.  Skin:  no suspicious lesions or rashes  Neurological: grossly intact  Psych:  Alert, oriented, affect appropriate with normal decision making ability.    IMPRESSION:    57 y.o female with morbid obesity who presents with chronic incisional pain from a hysterectomy 20 years ago. On exam it appears that the subcutaneous fat has not scarred together leaving this defect that she feels. I can not see how this would be causing her chronic pain. I therefore discussed that pulling this layer together is unlikely to help her pain. I do not feel any diastasis in the rectus or hernia to explain her pain either. I have asked radiology to attempt a steroid injection into the area to act both as a diagnostic or therapeutic test. She " is agreeable to attempt this.      PLAN:    Refer to IR for injection of lower abdomen, scar to see if this improves her pain or not. I feel that her scar has healed nicely and therefore do not know what I would be revising. I do not feel hernia but next step would likely be a CT scan of abdomen and pelvis to look for structural defect for pain.     Lazaro Pelletier MD,     4/24/2018  10:10 AM

## 2018-04-24 NOTE — PATIENT INSTRUCTIONS
Thank you for allowing Dr. Pelletier and our surgical team to participate in your care. Please call with any scheduling questions or concerns to Mikayla at [phone]   Brionna [phone]

## 2018-04-24 NOTE — MR AVS SNAPSHOT
After Visit Summary   4/24/2018    Maite Suero    MRN: [id number]           Patient Information     Date Of Birth          1960        Visit Information        Provider Department      4/24/2018 9:30 AM Lazaro Pelletier MD [facility]        Today's Diagnoses     Pain at surgical incision    -  1    Myalgia           Care Instructions    Thank you for allowing Dr. Pelletier and our surgical team to participate in your care. Please call with any scheduling questions or concerns to Mikayla at [phone]   Brionna [phone]            Follow-ups after your visit        Your next 10 appointments already scheduled     Oct 16, 2018  1:00 PM CDT   (Arrive by 12:45 PM)   Hearing Eval with Malick Dawson   [facility] ([facility] [place] )    [address]  [place] [address]   [phone]            Oct 16, 2018  1:30 PM CDT   (Arrive by 1:15 PM)   Return Visit with Darling Osuna NP   [facility] ([facility] [place] )    [address]  [place] [address]   [phone]              Future tests that were ordered for you today     Open Future Orders        Priority Expected Expires Ordered    IR Trigger Point Inj 1 Or 2 Muscles Routine  4/24/2019 4/24/2018            Who to contact     If you have questions or need follow up information about today's clinic visit or your schedule please contact [facility] directly at [phone].  Normal or non-critical lab and imaging results will be communicated to you by MyChart, letter or phone within 4 business days after the clinic has received the results. If you do not hear from us within 7 days, please contact the clinic through MyChart or phone. If you have a critical or abnormal lab result, we will notify you by phone as soon as possible.  Submit refill requests through linkedÃ¼ or call your pharmacy and they will forward the refill request to us. Please allow 3  "business days for your refill to be completed.          Additional Information About Your Visit        MyChart Information     My Computer Works lets you send messages to your doctor, view your test results, renew your prescriptions, schedule appointments and more. To sign up, go to [email or web address] . Click on \"Log in\" on the left side of the screen, which will take you to the Welcome page. Then click on \"Sign up Now\" on the right side of the page.     You will be asked to enter the access code listed below, as well as some personal information. Please follow the directions to create your username and password.     Your access code is: [email or web address]  Expires: 2018  2:21 PM     Your access code will  in 90 days. If you need help or a new code, please call your [place] clinic or [phone].        Care EveryWhere ID     This is your Care EveryWhere ID. This could be used by other organizations to access your [place] medical records  [id number]        Your Vitals Were     Pulse Temperature Height Pulse Oximetry BMI (Body Mass Index)       78 97.4  F (36.3  C) (Tympanic) 5' 3\" (1.6 m) 96% 48.54 kg/m2        Blood Pressure from Last 3 Encounters:   18 102/70   18 112/70   18 112/68    Weight from Last 3 Encounters:   18 274 lb (124.3 kg)   18 274 lb (124.3 kg)   18 272 lb (123.4 kg)                 Today's Medication Changes          These changes are accurate as of 18 10:44 AM.  If you have any questions, ask your nurse or doctor.               These medicines have changed or have updated prescriptions.        Dose/Directions    furosemide 20 MG tablet   Commonly known as:  LASIX   This may have changed:  See the new instructions.   Used for:  Edema, unspecified type        TAKE 2 TABLETS BY MOUTH EVERY MORNING AND 1 TABLET BY MOUTH EVERY DAY AT NOON   Quantity:  90 tablet   Refills:  5         Stop taking these medicines if you haven't already. Please contact your " care team if you have questions.     PROBIOTIC DAILY Caps                    Primary Care Provider Office Phone # Fax #    Yessi Dorsey [phone] [address] [facility] [address]        Equal Access to Services     EVE GONZALEZ : Hadii aad ku hadadenserena Soamtt, waaxda luqadaha, qaybta kaalmada adedorothy, michael michaelin hayaashahnaz rivasfrankieerasmo muñoz. So [facility] [place] [facility] [phone].    ATENCIÓN: Si habla español, tiene a gaviria disposición servicios gratuitos de asistencia lingüística. Llame al [phone].    We comply with applicable federal civil rights laws and [place] laws. We do not discriminate on the basis of race, color, national origin, age, disability, sex, sexual orientation, or gender identity.            Thank you!     Thank you for choosing [facility]  for your care. Our goal is always to provide you with excellent care. Hearing back from our patients is one way we can continue to improve our services. Please take a few minutes to complete the written survey that you may receive in the mail after your visit with us. Thank you!             Your Updated Medication List - Protect others around you: Learn how to safely use, store and throw away your medicines at www.disposemymeds.org.          This list is accurate as of 4/24/18 10:44 AM.  Always use your most recent med list.                   Brand Name Dispense Instructions for use Diagnosis    ABILIFY PO      Take 10 mg by mouth daily        aspirin 81 MG EC tablet   Generic drug:  aspirin      Take 81 mg by mouth daily        buPROPion 300 MG 24 hr tablet    WELLBUTRIN XL     Take 450 mg by mouth daily        COLACE PO      Take 100 mg by mouth daily 1 tablet daily        CRESTOR 20 MG tablet   Generic drug:  rosuvastatin     90 tablet    TAKE ONE TABLET BY MOUTH ONCE DAILY    Mixed hyperlipidemia       fluticasone 50 MCG/ACT spray    FLONASE    1 Bottle    Spray 1-2 sprays into both nostrils daily     Chronic rhinitis, unspecified type       furosemide 20 MG tablet    LASIX    90 tablet    TAKE 2 TABLETS BY MOUTH EVERY MORNING AND 1 TABLET BY MOUTH EVERY DAY AT NOON    Edema, unspecified type       LAMICTAL PO           mirabegron 50 MG 24 hr tablet    MYRBETRIQ    90 tablet    Take 1 tablet (50 mg) by mouth daily    Mixed incontinence urge and stress (male)(female), OAB (overactive bladder)       omeprazole 20 MG CR capsule    priLOSEC    60 capsule    TAKE 1 CAPSULE BY MOUTH TWICE DAILY    Gastroesophageal reflux disease without esophagitis       RITALIN LA 20 MG Cp24   Generic drug:  methylphenidate      Take 80 mg by mouth daily        ROZEREM 8 MG tablet   Generic drug:  ramelteon     30 tablet    TAKE 1 TABLET BY MOUTH EVERY NIGHT AT BEDTIME    Insomnia, unspecified type       sertraline 100 MG tablet    ZOLOFT     Take 2 tablets by mouth daily        vitamin D 2000 units Caps      Take 1 capsule by mouth daily

## 2018-04-24 NOTE — NURSING NOTE
"Chief Complaint   Patient presents with     Surgical Followup     s/p scar revision, pain at incision site       Initial /70  Pulse 78  Temp 97.4  F (36.3  C) (Tympanic)  Ht 5' 3\" (1.6 m)  Wt 274 lb (124.3 kg)  SpO2 96%  BMI 48.54 kg/m2 Estimated body mass index is 48.54 kg/(m^2) as calculated from the following:    Height as of this encounter: 5' 3\" (1.6 m).    Weight as of this encounter: 274 lb (124.3 kg).  Medication Reconciliation: complete     Maryan Alcantara      "

## 2018-04-26 DIAGNOSIS — G47.00 INSOMNIA, UNSPECIFIED TYPE: ICD-10-CM

## 2018-04-26 RX ORDER — RAMELTEON 8 MG/1
TABLET, FILM COATED ORAL
Qty: 30 TABLET | Refills: 3 | Status: SHIPPED | OUTPATIENT
Start: 2018-04-26 | End: 2018-08-28

## 2018-04-26 NOTE — TELEPHONE ENCOUNTER
Rozerem  Last office visit:  3-9-2018  Last refill date:  3-  #:  30       Refills:  0  Pharmacy: [facility], [place]    Thank you!

## 2018-04-30 DIAGNOSIS — K21.9 GASTROESOPHAGEAL REFLUX DISEASE WITHOUT ESOPHAGITIS: ICD-10-CM

## 2018-04-30 DIAGNOSIS — E78.2 MIXED HYPERLIPIDEMIA: ICD-10-CM

## 2018-04-30 RX ORDER — ROSUVASTATIN CALCIUM 20 MG/1
TABLET, COATED ORAL
Qty: 90 TABLET | Refills: 3 | Status: SHIPPED | OUTPATIENT
Start: 2018-04-30 | End: 2019-03-04

## 2018-04-30 NOTE — TELEPHONE ENCOUNTER
Prilosec  Last Written Prescription Date:  2/5/18  Last Fill Qty:  60, # Refills:  2  Last Office Visit:  3/9/18    Crestor  Last Written Prescription Date:  2/2/17  Last Fill Qty:  90, # Refills:  3  Last Office Visit:  3/9/18

## 2018-05-01 ENCOUNTER — TELEPHONE (OUTPATIENT)
Dept: FAMILY MEDICINE | Facility: OTHER | Age: 58
End: 2018-05-01

## 2018-05-01 NOTE — TELEPHONE ENCOUNTER
Received an APPROVAL from [employer] on 05/01/2018 for Rozerem. Effective 01/31/2018 to 05/01/2019. Forms scanned to [facility].

## 2018-05-25 NOTE — PROGRESS NOTES
Outpatient Physical Therapy Discharge Note     Patient: Maite Suero  : 1960    Beginning/End Dates of Reporting Period:  2017 to 2018    Referring Provider: Dr. Campo    Therapy Diagnosis: pain and some vertigo     Client Self Report: states her left side of her face hurts pointing towards TMJ area, and slight dizziness with turniung right.    Objective Measurements:                                 eval only and was given strategies for jaw and posture as well as vertigo         Outcome Measures (most recent score):      Goals:  Goal Identifier functional/long   Goal Description Client will be independent with HEP and be free of dizziness wihen she is moving to right.   Target Date 10/27/17   Date Met      Progress:     Goal Identifier     Goal Description     Target Date     Date Met      Progress:     Goal Identifier     Goal Description     Target Date     Date Met      Progress:     Goal Identifier     Goal Description     Target Date     Date Met      Progress:     Goal Identifier     Goal Description     Target Date     Date Met      Progress:     Goal Identifier     Goal Description     Target Date     Date Met      Progress:     Goal Identifier     Goal Description     Target Date     Date Met      Progress:     Goal Identifier     Goal Description     Target Date     Date Met      Progress:     Progress Toward Goals:   Progress limited due to did  Not come back after eval    Plan:  Discharge from therapy.    Discharge:    Reason for Discharge: Patient chooses to discontinue therapy.    Equipment Issued:     Discharge Plan: Patient to continue home program.

## 2018-06-21 DIAGNOSIS — J31.0 CHRONIC RHINITIS, UNSPECIFIED TYPE: ICD-10-CM

## 2018-06-21 RX ORDER — FLUTICASONE PROPIONATE 50 MCG
SPRAY, SUSPENSION (ML) NASAL
Qty: 16 ML | Refills: 1 | Status: SHIPPED | OUTPATIENT
Start: 2018-06-21 | End: 2018-08-18

## 2018-08-18 DIAGNOSIS — J31.0 CHRONIC RHINITIS, UNSPECIFIED TYPE: ICD-10-CM

## 2018-08-20 RX ORDER — FLUTICASONE PROPIONATE 50 MCG
SPRAY, SUSPENSION (ML) NASAL
Qty: 16 ML | Refills: 1 | Status: SHIPPED | OUTPATIENT
Start: 2018-08-20 | End: 2018-10-13

## 2018-08-27 NOTE — PROGRESS NOTES
SUBJECTIVE:                                                    Maite Suero is a 58 year old female who presents to clinic today for the following health issues:      Hyperlipidemia Follow-Up      Rate your low fat/cholesterol diet?: poor    Taking statin?  Yes, no muscle aches from statin    Other lipid medications/supplements?:  Flax seed    Hypertension Follow-up      Outpatient blood pressures are not being checked.    Low Salt Diet: not monitoring salt    Patient will be due for labs early September, she will return fasting.        Amount of exercise or physical activity: 6-7 days/week for an average of less than 15 minutes    Problems taking medications regularly: No    Medication side effects: none    Diet: regular (no restrictions)      Patient is also due for refill of Myrbetriq, she states she has been doing well with current medications, and she was only being seen by Urology for refills of medication.    Patient has a superficial abdominal mass along the right upper side, she would like to have this removed.      Problem list and histories reviewed & adjusted, as indicated.  Additional history: as documented    Patient Active Problem List   Diagnosis     Benign essential hypertension     Hyperlipidemia     Major depressive disorder, recurrent episode (H)     Ankle sprain     Weight gain     GERD (gastroesophageal reflux disease)     Low back pain     Cervicalgia (NECK)     Urinary, incontinence, stress female     Irritable bowel syndrome     Morbid obesity (H)     Dissociative identity disorder     Vitamin D deficiency     OAB (overactive bladder)     ACP (advance care planning)     Mixed stress and urge urinary incontinence     Past Surgical History:   Procedure Laterality Date     cholecystectomy       COLONOSCOPY N/A 10/9/2014    Procedure: COLONOSCOPY;  Surgeon: Vandana Daniels MD;  Location: [facility]     GYN SURGERY       HYSTERECTOMY      YEMI/BSO, no cervix; fibroids       Social History  "  Substance Use Topics     Smoking status: Never Smoker     Smokeless tobacco: Never Used     Alcohol use No     Family History   Problem Relation Age of Onset     Psychotic Disorder Mother      Psychotic Disorder Father      Psychotic Disorder Brother      Psychotic Disorder Sister          Current Outpatient Prescriptions   Medication Sig Dispense Refill     mirabegron (MYRBETRIQ) 50 MG 24 hr tablet Take 1 tablet (50 mg) by mouth daily 90 tablet 3     aspirin (ASPIRIN) 81 MG EC tablet Take 81 mg by mouth daily        buPROPion (WELLBUTRIN XL) 300 MG 24 hr tablet Take 450 mg by mouth daily       Cholecalciferol (VITAMIN D) 2000 UNITS CAPS Take 1 capsule by mouth daily       Docusate Sodium (COLACE PO) Take 100 mg by mouth daily 1 tablet daily       fluticasone (FLONASE) 50 MCG/ACT spray SHAKE LIQUID AND USE 1 TO 2 SPRAYS IN EACH NOSTRIL DAILY 16 mL 1     methylphenidate (RITALIN LA) 20 MG CP24 Take 80 mg by mouth daily       omeprazole (PRILOSEC) 20 MG CR capsule TAKE 1 CAPSULE BY MOUTH TWICE DAILY 60 capsule 11     rosuvastatin (CRESTOR) 20 MG tablet TAKE 1 TABLET BY MOUTH DAILY 90 tablet 3     ROZEREM 8 MG tablet TAKE 1 TABLET BY MOUTH EVERY NIGHT AT BEDTIME 30 tablet 3     sertraline (ZOLOFT) 100 MG tablet Take 2 tablets by mouth daily       Allergies   Allergen Reactions     Shellfish Allergy Anaphylaxis     Ambien [Zolpidem] Other (See Comments)     Pt states she was baking food in her sleep     Pork Allergy      Seasonal      Povidone Iodine Rash       ROS:  Constitutional, HEENT, cardiovascular, pulmonary, gi and gu systems are negative, except as otherwise noted.    OBJECTIVE:     /78 (BP Location: Left arm, Patient Position: Chair, Cuff Size: Adult Large)  Pulse 91  Temp 98.3  F (36.8  C) (Tympanic)  Resp 16  Ht 5' 3\" (1.6 m)  Wt 273 lb (123.8 kg)  SpO2 96%  BMI 48.36 kg/m2  Body mass index is 48.36 kg/(m^2).  GENERAL: healthy, alert and no distress  ABD: fairly superficial mobile mass, " nontender  PSYCH: mentation appears normal, affect normal/bright    Diagnostic Test Results:  none     ASSESSMENT/PLAN:     Hyperlipidemia; controlled   Plan:  Labs:   Future lab orders placed.    Hypertension; controlled   Associated with the following complications:    None   Plan:  Labs:   As above          ICD-10-CM    1. Hyperlipidemia, unspecified hyperlipidemia type E78.5 Lipid Profile     ALT   2. Benign essential hypertension I10 Basic metabolic panel   3. OAB (overactive bladder) N32.81 mirabegron (MYRBETRIQ) 50 MG 24 hr tablet   4. Mixed incontinence urge and stress (male)(female) N39.46 mirabegron (MYRBETRIQ) 50 MG 24 hr tablet   5. Abdominal wall mass of right upper quadrant R19.01 GENERAL SURG ADULT REFERRAL       FUTURE APPOINTMENTS:       - Follow-up visit in 6 months, sooner as needed.      Yessi Dorsey MD  [facility]

## 2018-08-28 ENCOUNTER — OFFICE VISIT (OUTPATIENT)
Dept: FAMILY MEDICINE | Facility: OTHER | Age: 58
End: 2018-08-28
Attending: FAMILY MEDICINE
Payer: MEDICARE

## 2018-08-28 VITALS
TEMPERATURE: 98.3 F | OXYGEN SATURATION: 96 % | SYSTOLIC BLOOD PRESSURE: 108 MMHG | WEIGHT: 273 LBS | DIASTOLIC BLOOD PRESSURE: 78 MMHG | RESPIRATION RATE: 16 BRPM | BODY MASS INDEX: 48.37 KG/M2 | HEIGHT: 63 IN | HEART RATE: 91 BPM

## 2018-08-28 DIAGNOSIS — N39.46 MIXED INCONTINENCE URGE AND STRESS (MALE)(FEMALE): ICD-10-CM

## 2018-08-28 DIAGNOSIS — I10 BENIGN ESSENTIAL HYPERTENSION: ICD-10-CM

## 2018-08-28 DIAGNOSIS — G47.00 INSOMNIA, UNSPECIFIED TYPE: ICD-10-CM

## 2018-08-28 DIAGNOSIS — Z12.39 SCREENING BREAST EXAMINATION: Primary | ICD-10-CM

## 2018-08-28 DIAGNOSIS — N32.81 OAB (OVERACTIVE BLADDER): ICD-10-CM

## 2018-08-28 DIAGNOSIS — R19.01 ABDOMINAL WALL MASS OF RIGHT UPPER QUADRANT: ICD-10-CM

## 2018-08-28 DIAGNOSIS — E78.5 HYPERLIPIDEMIA, UNSPECIFIED HYPERLIPIDEMIA TYPE: Primary | ICD-10-CM

## 2018-08-28 PROCEDURE — G0463 HOSPITAL OUTPT CLINIC VISIT: HCPCS

## 2018-08-28 PROCEDURE — 99214 OFFICE O/P EST MOD 30 MIN: CPT | Performed by: FAMILY MEDICINE

## 2018-08-28 RX ORDER — MIRABEGRON 50 MG/1
50 TABLET, EXTENDED RELEASE ORAL DAILY
Qty: 90 TABLET | Refills: 3 | Status: SHIPPED | OUTPATIENT
Start: 2018-08-28 | End: 2019-06-24

## 2018-08-28 ASSESSMENT — PAIN SCALES - GENERAL: PAINLEVEL: NO PAIN (1)

## 2018-08-28 ASSESSMENT — ANXIETY QUESTIONNAIRES
3. WORRYING TOO MUCH ABOUT DIFFERENT THINGS: NOT AT ALL
IF YOU CHECKED OFF ANY PROBLEMS ON THIS QUESTIONNAIRE, HOW DIFFICULT HAVE THESE PROBLEMS MADE IT FOR YOU TO DO YOUR WORK, TAKE CARE OF THINGS AT HOME, OR GET ALONG WITH OTHER PEOPLE: NOT DIFFICULT AT ALL
4. TROUBLE RELAXING: NOT AT ALL
1. FEELING NERVOUS, ANXIOUS, OR ON EDGE: NOT AT ALL
6. BECOMING EASILY ANNOYED OR IRRITABLE: SEVERAL DAYS
5. BEING SO RESTLESS THAT IT IS HARD TO SIT STILL: NOT AT ALL
7. FEELING AFRAID AS IF SOMETHING AWFUL MIGHT HAPPEN: NOT AT ALL
2. NOT BEING ABLE TO STOP OR CONTROL WORRYING: NOT AT ALL
GAD7 TOTAL SCORE: 1

## 2018-08-28 NOTE — MR AVS SNAPSHOT
After Visit Summary   8/28/2018    Maite Suero    MRN: [id number]           Patient Information     Date Of Birth          1960        Visit Information        Provider Department      8/28/2018 10:15 AM Yessi Dorsey MD [facility]        Today's Diagnoses     Hyperlipidemia, unspecified hyperlipidemia type    -  1    Benign essential hypertension        OAB (overactive bladder)        Mixed incontinence urge and stress (male)(female)        Abdominal wall mass of right upper quadrant           Follow-ups after your visit        Additional Services     GENERAL SURG ADULT REFERRAL       Your provider has referred you to: [facility] [phone]  [email or web address]    Superficial mass (possibly cyst), patient requests removal    Please be aware that coverage of these services is subject to the terms and limitations of your health insurance plan.  Call member services at your health plan with any benefit or coverage questions.      Please bring the following with you to your appointment:    (1) Any X-Rays, CTs or MRIs which have been performed.  Contact the facility where they were done to arrange for  prior to your scheduled appointment.   (2) List of current medications   (3) This referral request   (4) Any documents/labs given to you for this referral                  Follow-up notes from your care team     Return in about 6 months (around 2/28/2019).      Your next 10 appointments already scheduled     Sep 05, 2018  2:30 PM CDT   (Arrive by 2:15 PM)   New Visit with Lazaro Pelletier MD   [facility] ([facility] [place] )    [address] [place] [address]   [phone]            Oct 16, 2018  1:00 PM CDT   (Arrive by 12:45 PM)   Hearing Eval with Malick Dawson   [facility] ([facility] )    [address] [place] [address]  "  [phone]            Oct 16, 2018  2:00 PM CDT   (Arrive by 1:45 PM)   MA SCREENING BILATERAL W/ GREG with HCMA1   [facility] [place] [facility] ([facility] [place] )    [address]  [place] [address]   [phone]           Three-dimensional (3D) mammograms are available at [place] locations in [facility], [place], [place], [facility], [place], [place], and [place]. -Health locations include [place] and [facility] in [place]. Benefits of 3D mammograms include: - Improved rate of cancer detection - Decreases your chance of having to go back for more tests, which means fewer: - \"False-positive\" results (This means that there is an abnormal area but it isn't cancer.) - Invasive testing procedures, such as a biopsy or surgery - Can provide clearer images of the breast if you have dense breast tissue. 3D mammography is an optional exam that anyone can have with a 2D mammogram. It doesn't replace or take the place of a 2D mammogram. 2D mammograms remain an effective screening test for all women.  Not all insurance companies cover the cost of a 3D mammogram. Check with your insurance.            Mar 05, 2019  9:45 AM CST   (Arrive by 9:30 AM)   SHORT with Yessi Dorsey MD   [facility] ([facility] )    [address]  [place] [address]   [phone]              Future tests that were ordered for you today     Open Future Orders        Priority Expected Expires Ordered    MA Screen Bilateral w/Greg Routine  8/28/2019 8/28/2018    Basic metabolic panel Routine 9/10/2018 8/28/2019 8/28/2018    Lipid Profile Routine 9/10/2018 8/28/2019 8/28/2018    ALT Routine 9/10/2018 8/28/2019 8/28/2018            Who to contact     If you have questions or need follow up information about today's clinic visit or your schedule please contact [facility] directly at [phone].  Normal or " "non-critical lab and imaging results will be communicated to you by MyChart, letter or phone within 4 business days after the clinic has received the results. If you do not hear from us within 7 days, please contact the clinic through [employer] - /l?k â€¢ splik/t or phone. If you have a critical or abnormal lab result, we will notify you by phone as soon as possible.  Submit refill requests through [employer] or call your pharmacy and they will forward the refill request to us. Please allow 3 business days for your refill to be completed.          Additional Information About Your Visit        [employer] Information     [employer] lets you send messages to your doctor, view your test results, renew your prescriptions, schedule appointments and more. To sign up, go to [email or web address] . Click on \"Log in\" on the left side of the screen, which will take you to the Welcome page. Then click on \"Sign up Now\" on the right side of the page.     You will be asked to enter the access code listed below, as well as some personal information. Please follow the directions to create your username and password.     Your access code is: [email or web address]  Expires: 2018 11:26 AM     Your access code will  in 90 days. If you need help or a new code, please call your [place] clinic or [phone].        Care EveryWhere ID     This is your Care EveryWhere ID. This could be used by other organizations to access your [place] medical records  [id number]        Your Vitals Were     Pulse Temperature Respirations Height Pulse Oximetry BMI (Body Mass Index)    91 98.3  F (36.8  C) (Tympanic) 16 5' 3\" (1.6 m) 96% 48.36 kg/m2       Blood Pressure from Last 3 Encounters:   18 108/78   18 102/70   18 112/70    Weight from Last 3 Encounters:   18 273 lb (123.8 kg)   18 274 lb (124.3 kg)   18 274 lb (124.3 kg)              We Performed the Following     GENERAL SURG ADULT REFERRAL          Where to get your medicines    "   These medications were sent to [id number] [address]  [address] [facility] [address]  [address] , [place] [address]     Phone:  [phone]     mirabegron 50 MG 24 hr tablet          Primary Care Provider Office Phone # Fax #    Yessi ROSANGELA Dorsey [phone] [address] [facility] [address]        Equal Access to Services     EVE GONZALEZ : Hadii aad ku hadasho Soomaali, waaxda luqadaha, qaybta kaalmada adeegyada, waxay idiin hayaan adeeg kharaerasmo lanorm muñoz. So [facility] [phone].    ATENCIÓN: Si habla español, tiene a gaviria disposición servicios gratuitos de asistencia lingüística. [facility] [phone].    We comply with applicable federal civil rights laws and [place] laws. We do not discriminate on the basis of race, color, national origin, age, disability, sex, sexual orientation, or gender identity.            Thank you!     Thank you for choosing [facility]  for your care. Our goal is always to provide you with excellent care. Hearing back from our patients is one way we can continue to improve our services. Please take a few minutes to complete the written survey that you may receive in the mail after your visit with us. Thank you!             Your Updated Medication List - Protect others around you: Learn how to safely use, store and throw away your medicines at www.disposemymeds.org.          This list is accurate as of 8/28/18  1:19 PM.  Always use your most recent med list.                   Brand Name Dispense Instructions for use Diagnosis    aspirin 81 MG EC tablet   Generic drug:  aspirin      Take 81 mg by mouth daily        buPROPion 300 MG 24 hr tablet    WELLBUTRIN XL     Take 450 mg by mouth daily        COLACE PO      Take 100 mg by mouth daily 1 tablet daily        fluticasone 50 MCG/ACT spray    FLONASE    16 mL    SHAKE LIQUID AND USE 1 TO 2 SPRAYS IN EACH NOSTRIL DAILY    Chronic  rhinitis, unspecified type       mirabegron 50 MG 24 hr tablet    MYRBETRIQ    90 tablet    Take 1 tablet (50 mg) by mouth daily    Mixed incontinence urge and stress (male)(female), OAB (overactive bladder)       omeprazole 20 MG CR capsule    priLOSEC    60 capsule    TAKE 1 CAPSULE BY MOUTH TWICE DAILY    Gastroesophageal reflux disease without esophagitis       RITALIN LA 20 MG Cp24   Generic drug:  methylphenidate      Take 80 mg by mouth daily        rosuvastatin 20 MG tablet    CRESTOR    90 tablet    TAKE 1 TABLET BY MOUTH DAILY    Mixed hyperlipidemia       ROZEREM 8 MG tablet   Generic drug:  ramelteon     30 tablet    TAKE 1 TABLET BY MOUTH EVERY NIGHT AT BEDTIME    Insomnia, unspecified type       sertraline 100 MG tablet    ZOLOFT     Take 2 tablets by mouth daily        vitamin D 2000 units Caps      Take 1 capsule by mouth daily

## 2018-08-28 NOTE — NURSING NOTE
"Chief Complaint   Patient presents with     RECHECK       Initial /78 (BP Location: Left arm, Patient Position: Chair, Cuff Size: Adult Large)  Pulse 91  Temp 98.3  F (36.8  C) (Tympanic)  Resp 16  Ht 5' 3\" (1.6 m)  Wt 273 lb (123.8 kg)  SpO2 96%  BMI 48.36 kg/m2 Estimated body mass index is 48.36 kg/(m^2) as calculated from the following:    Height as of this encounter: 5' 3\" (1.6 m).    Weight as of this encounter: 273 lb (123.8 kg).  Medication Reconciliation: complete    Pamela M. Lechevalier, LPN    "

## 2018-08-28 NOTE — LETTER
My Depression Action Plan  Name: Maite Suero   Date of Birth 1960  Date: 8/27/2018    My doctor: Yessi Dorsey   My clinic: [facility]  [address] [place] [address] [address]  [place] [address]  [phone]          GREEN    ZONE   Good Control    What it looks like:     Things are going generally well. You have normal up s and down s. You may even feel depressed from time to time, but bad moods usually last less than a day.   What you need to do:  1. Continue to care for yourself (see self care plan)  2. Check your depression survival kit and update it as needed  3. Follow your physician s recommendations including any medication.  4. Do not stop taking medication unless you consult with your physician first.           YELLOW         ZONE Getting Worse    What it looks like:     Depression is starting to interfere with your life.     It may be hard to get out of bed; you may be starting to isolate yourself from others.    Symptoms of depression are starting to last most all day and this has happened for several days.     You may have suicidal thoughts but they are not constant.   What you need to do:     1. Call your care team, your response to treatment will improve if you keep your care team informed of your progress. Yellow periods are signs an adjustment may need to be made.     2. Continue your self-care, even if you have to fake it!    3. Talk to someone in your support network    4. Open up your depression survival kit           RED    ZONE Medical Alert - Get Help    What it looks like:     Depression is seriously interfering with your life.     You may experience these or other symptoms: You can t get out of bed most days, can t work or engage in other necessary activities, you have trouble taking care of basic hygiene, or basic responsibilities, thoughts of suicide or death that will not go away, self-injurious behavior.     What you need to do:  1. Call your care team  and request a same-day appointment. If they are not available (weekends or after hours) call your local crisis line, emergency room or 911.            Depression Self Care Plan / Survival Kit    Self-Care for Depression  Here s the deal. Your body and mind are really not as separate as most people think.  What you do and think affects how you feel and how you feel influences what you do and think. This means if you do things that people who feel good do, it will help you feel better.  Sometimes this is all it takes.  There is also a place for medication and therapy depending on how severe your depression is, so be sure to consult with your medical provider and/ or Behavioral Health Consultant if your symptoms are worsening or not improving.     In order to better manage my stress, I will:    Exercise  Get some form of exercise, every day. This will help reduce pain and release endorphins, the  feel good  chemicals in your brain. This is almost as good as taking antidepressants!  This is not the same as joining a gym and then never going! (they count on that by the way ) It can be as simple as just going for a walk or doing some gardening, anything that will get you moving.      Hygiene   Maintain good hygiene (Get out of bed in the morning, Make your bed, Brush your teeth, Take a shower, and Get dressed like you were going to work, even if you are unemployed).  If your clothes don't fit try to get ones that do.    Diet  I will strive to eat foods that are good for me, drink plenty of water, and avoid excessive sugar, caffeine, alcohol, and other mood-altering substances.  Some foods that are helpful in depression are: complex carbohydrates, B vitamins, flaxseed, fish or fish oil, fresh fruits and vegetables.    Psychotherapy  I agree to participate in Individual Therapy (if recommended).    Medication  If prescribed medications, I agree to take them.  Missing doses can result in serious side effects.  I understand  that drinking alcohol, or other illicit drug use, may cause potential side effects.  I will not stop my medication abruptly without first discussing it with my provider.    Staying Connected With Others  I will stay in touch with my friends, family members, and my primary care provider/team.    Use your imagination  Be creative.  We all have a creative side; it doesn t matter if it s oil painting, sand castles, or mud pies! This will also kick up the endorphins.    Witness Beauty  (AKA stop and smell the roses) Take a look outside, even in mid-winter. Notice colors, textures. Watch the squirrels and birds.     Service to others  Be of service to others.  There is always someone else in need.  By helping others we can  get out of ourselves  and remember the really important things.  This also provides opportunities for practicing all the other parts of the program.    Humor  Laugh and be silly!  Adjust your TV habits for less news and crime-drama and more comedy.    Control your stress  Try breathing deep, massage therapy, biofeedback, and meditation. Find time to relax each day.     My support system    Clinic Contact:  Phone number:    Contact 1:  Phone number:    Contact 2:  Phone number:    [employer]/:  Phone number:    Therapist:  Phone number:    Local crisis center:    Phone number:    Other community support:  Phone number:

## 2018-08-29 ASSESSMENT — ANXIETY QUESTIONNAIRES: GAD7 TOTAL SCORE: 1

## 2018-08-29 ASSESSMENT — PATIENT HEALTH QUESTIONNAIRE - PHQ9: SUM OF ALL RESPONSES TO PHQ QUESTIONS 1-9: 3

## 2018-08-29 NOTE — TELEPHONE ENCOUNTER
[place]  Last Office Visit: 8/28/18  Last Refill Date:4/26/18  # 30          Refills  3      Thank you!

## 2018-08-30 RX ORDER — RAMELTEON 8 MG/1
TABLET, FILM COATED ORAL
Qty: 30 TABLET | Refills: 3 | Status: SHIPPED | OUTPATIENT
Start: 2018-08-30 | End: 2019-01-07

## 2018-09-05 ENCOUNTER — OFFICE VISIT (OUTPATIENT)
Dept: SURGERY | Facility: OTHER | Age: 58
End: 2018-09-05
Attending: FAMILY MEDICINE
Payer: MEDICARE

## 2018-09-05 VITALS
DIASTOLIC BLOOD PRESSURE: 72 MMHG | TEMPERATURE: 97.8 F | SYSTOLIC BLOOD PRESSURE: 116 MMHG | HEIGHT: 63 IN | WEIGHT: 273 LBS | OXYGEN SATURATION: 95 % | BODY MASS INDEX: 48.37 KG/M2 | HEART RATE: 83 BPM | RESPIRATION RATE: 16 BRPM

## 2018-09-05 DIAGNOSIS — R19.01 ABDOMINAL WALL MASS OF RIGHT UPPER QUADRANT: ICD-10-CM

## 2018-09-05 PROCEDURE — 12031 INTMD RPR S/A/T/EXT 2.5 CM/<: CPT | Performed by: SURGERY

## 2018-09-05 PROCEDURE — G0463 HOSPITAL OUTPT CLINIC VISIT: HCPCS | Mod: 25

## 2018-09-05 PROCEDURE — 11401 EXC TR-EXT B9+MARG 0.6-1 CM: CPT

## 2018-09-05 PROCEDURE — 11401 EXC TR-EXT B9+MARG 0.6-1 CM: CPT | Performed by: SURGERY

## 2018-09-05 PROCEDURE — 88304 TISSUE EXAM BY PATHOLOGIST: CPT | Mod: TC | Performed by: SURGERY

## 2018-09-05 PROCEDURE — G0463 HOSPITAL OUTPT CLINIC VISIT: HCPCS

## 2018-09-05 PROCEDURE — 12031 INTMD RPR S/A/T/EXT 2.5 CM/<: CPT

## 2018-09-05 ASSESSMENT — PAIN SCALES - GENERAL: PAINLEVEL: MILD PAIN (2)

## 2018-09-05 NOTE — NURSING NOTE
"Chief Complaint   Patient presents with     Consult     referred by Dr Dorsey for cyst on right sided  upper abdomen causing pain while sitting for a long period of time        Initial /72 (BP Location: Right arm, Patient Position: Sitting, Cuff Size: Adult Large)  Pulse 83  Temp 97.8  F (36.6  C) (Tympanic)  Resp 16  Ht 5' 3\" (1.6 m)  Wt 273 lb (123.8 kg)  SpO2 95%  BMI 48.36 kg/m2 Estimated body mass index is 48.36 kg/(m^2) as calculated from the following:    Height as of this encounter: 5' 3\" (1.6 m).    Weight as of this encounter: 273 lb (123.8 kg).  Medication Reconciliation: complete    Desiree Fletcher LPN    "

## 2018-09-05 NOTE — PATIENT INSTRUCTIONS
Thank you for allowing Dr. Pelletier and the surgical team to participate in your care today. If you have any questions or concerns, you can call Dr. Pelletier office at [phone]* or [phone] between the hours of 8AM and 4:30PM Monday through Friday.     POST PROCEDURE INSTRUCTIONS      Apply ice to the surgical area to reduce swelling. (no longer than 20 minutes at a time)    Remove your dressing in 24 hours    Keep incision clean and dry   Do NOT soak in water such as a tub bath or swimming   Do NOT put make-up, deodorant, powders, hairspray, lotions, etc on the incision    Cover with a clean dressing daily or when wet/soiled    If you have steri-strips, these will fall off on their own in 7 days. If they are still adhered after 7 days, you may remove them by pulling gently.     You can use acetaminophen(Tylenol) or the prescription you received for pain.     If you have any bleeding, cover the wound with clean gauze and hold pressure for 10 Minutes. If the bleeding does not stop or is heavy and profuse, call the clinic or go to the Urgent Care/Emergency Department.      SIGNS OF INFECTION ARE:    Redness, swelling, red streaks, pus, drainage, warmth, fever, increased pain, foul smell.     Contact your surgeon or primary health care provider if you notice any of the warning signs.     FOLLOW - UP      Follow-up in clinic as needed

## 2018-09-05 NOTE — MR AVS SNAPSHOT
After Visit Summary   9/5/2018    Maite Suero    MRN: [id number]           Patient Information     Date Of Birth          1960        Visit Information        Provider Department      9/5/2018 2:30 PM Lazaro Pelletier MD [facility]        Today's Diagnoses     Abdominal wall mass of right upper quadrant          Care Instructions    Thank you for allowing Dr. Pelletier and the surgical team to participate in your care today. If you have any questions or concerns, you can call Dr. Pelletier office at [phone]* or [phone] between the hours of 8AM and 4:30PM Monday through Friday.     POST PROCEDURE INSTRUCTIONS      Apply ice to the surgical area to reduce swelling. (no longer than 20 minutes at a time)    Remove your dressing in 24 hours    Keep incision clean and dry   Do NOT soak in water such as a tub bath or swimming   Do NOT put make-up, deodorant, powders, hairspray, lotions, etc on the incision    Cover with a clean dressing daily or when wet/soiled    If you have steri-strips, these will fall off on their own in 7 days. If they are still adhered after 7 days, you may remove them by pulling gently.     You can use acetaminophen(Tylenol) or the prescription you received for pain.     If you have any bleeding, cover the wound with clean gauze and hold pressure for 10 Minutes. If the bleeding does not stop or is heavy and profuse, call the clinic or go to the Urgent Care/Emergency Department.      SIGNS OF INFECTION ARE:    Redness, swelling, red streaks, pus, drainage, warmth, fever, increased pain, foul smell.     Contact your surgeon or primary health care provider if you notice any of the warning signs.     FOLLOW - UP      Follow-up in clinic as needed                  Follow-ups after your visit        Your next 10 appointments already scheduled     Oct 16, 2018  1:00 PM CDT   (Arrive by 12:45 PM)   Hearing Eval with Malick Dawson   [facility]  "([facility] )    [address]  [facility] [address]   [phone]            Oct 16, 2018  2:00 PM CDT   (Arrive by 1:45 PM)   MA SCREENING BILATERAL W/ GREG with HCMA1   [facility] ([facility] )    [address] [place] [address]   [phone]           Three-dimensional (3D) mammograms are available at [facility] locations in [facility], [facility], [facility], [place], [place], and [place]. -Health locations include [place] [facility] in [place]. Benefits of 3D mammograms include: - Improved rate of cancer detection - Decreases your chance of having to go back for more tests, which means fewer: - \"False-positive\" results (This means that there is an abnormal area but it isn't cancer.) - Invasive testing procedures, such as a biopsy or surgery - Can provide clearer images of the breast if you have dense breast tissue. 3D mammography is an optional exam that anyone can have with a 2D mammogram. It doesn't replace or take the place of a 2D mammogram. 2D mammograms remain an effective screening test for all women.  Not all insurance companies cover the cost of a 3D mammogram. Check with your insurance.            Mar 05, 2019  9:45 AM CST   (Arrive by 9:30 AM)   SHORT with Yessi Dorsey MD   [facility] ([facility] )    [address]  [facility] [address]   [phone]              Who to contact     If you have questions or need follow up information about today's clinic visit or your schedule please contact [facility] directly at [phone].  Normal or non-critical lab and imaging results will be communicated to you by MyChart, letter or phone within 4 business days after the clinic has received the results. If you do not hear from us within 7 days, please contact the clinic through MyChart or phone. If you have a critical " "or abnormal lab result, we will notify you by phone as soon as possible.  Submit refill requests through [employer] or call your pharmacy and they will forward the refill request to us. Please allow 3 business days for your refill to be completed.          Additional Information About Your Visit        [employer] Information     [employer] lets you send messages to your doctor, view your test results, renew your prescriptions, schedule appointments and more. To sign up, go to [email or web address] . Click on \"Log in\" on the left side of the screen, which will take you to the Welcome page. Then click on \"Sign up Now\" on the right side of the page.     You will be asked to enter the access code listed below, as well as some personal information. Please follow the directions to create your username and password.     Your access code is: [email or web address]  Expires: 2018 11:26 AM     Your access code will  in 90 days. If you need help or a new code, please call your [facility] clinic or [phone].        Care EveryWhere ID     This is your Care EveryWhere ID. This could be used by other organizations to access your [facility] medical records  [id number]        Your Vitals Were     Pulse Temperature Respirations Height Pulse Oximetry BMI (Body Mass Index)    83 97.8  F (36.6  C) (Tympanic) 16 5' 3\" (1.6 m) 95% 48.36 kg/m2       Blood Pressure from Last 3 Encounters:   18 116/72   18 108/78   18 102/70    Weight from Last 3 Encounters:   18 273 lb (123.8 kg)   18 273 lb (123.8 kg)   18 274 lb (124.3 kg)              Today, you had the following     No orders found for display       Primary Care Provider Office Phone # Fax #    Yessi Dorsey [phone] [address] [facility] [address]        Equal Access to Services     EVE GONZALEZ AH: Aakash Webster, yves ramon, michael velaarash " la'jilliann wanda. So [facility] [phone].    ATENCIÓN: Si habla van, tiene a gaviria disposición servicios gratuitos de asistencia lingüística. Olegario lizama [phone].    We comply with applicable federal civil rights laws and [place] laws. We do not discriminate on the basis of race, color, national origin, age, disability, sex, sexual orientation, or gender identity.            Thank you!     Thank you for choosing [facility]  for your care. Our goal is always to provide you with excellent care. Hearing back from our patients is one way we can continue to improve our services. Please take a few minutes to complete the written survey that you may receive in the mail after your visit with us. Thank you!             Your Updated Medication List - Protect others around you: Learn how to safely use, store and throw away your medicines at www.disposemymeds.org.          This list is accurate as of 9/5/18  3:51 PM.  Always use your most recent med list.                   Brand Name Dispense Instructions for use Diagnosis    aspirin 81 MG EC tablet   Generic drug:  aspirin      Take 81 mg by mouth daily        buPROPion 300 MG 24 hr tablet    WELLBUTRIN XL     Take 300 mg by mouth daily        COLACE PO      Take 100 mg by mouth daily 1 tablet daily        fluticasone 50 MCG/ACT spray    FLONASE    16 mL    SHAKE LIQUID AND USE 1 TO 2 SPRAYS IN EACH NOSTRIL DAILY    Chronic rhinitis, unspecified type       mirabegron 50 MG 24 hr tablet    MYRBETRIQ    90 tablet    Take 1 tablet (50 mg) by mouth daily    Mixed incontinence urge and stress (male)(female), OAB (overactive bladder)       omeprazole 20 MG CR capsule    priLOSEC    60 capsule    TAKE 1 CAPSULE BY MOUTH TWICE DAILY    Gastroesophageal reflux disease without esophagitis       RITALIN LA 20 MG Cp24   Generic drug:  methylphenidate      Take 80 mg by mouth daily        rosuvastatin 20 MG tablet    CRESTOR    90 tablet    TAKE 1 TABLET BY MOUTH DAILY    Mixed  hyperlipidemia       ROZEREM 8 MG tablet   Generic drug:  ramelteon     30 tablet    TAKE 1 TABLET BY MOUTH EVERY NIGHT AT BEDTIME    Insomnia, unspecified type       sertraline 100 MG tablet    ZOLOFT     Take 2 tablets by mouth daily        vitamin D 2000 units Caps      Take 1 capsule by mouth daily

## 2018-09-06 NOTE — PROGRESS NOTES
"[facility] Progress Note    HPI: 58 y.o. Female who I have seen in the past for pain at her  scar, presents with new onset painful mass in her right upper quadrant abdominal wall.       S: She reports noticing a painful lump in her right upper abdomen. It is most noticeable when she is sitting in the car. She believes it has gotten slightly bigger. She noticed it a month or so ago. It does cause her pain at times and she finds herself constantly touching it and would like it removed.     O:   Vitals:  /72 (BP Location: Right arm, Patient Position: Sitting, Cuff Size: Adult Large)  Pulse 83  Temp 97.8  F (36.6  C) (Tympanic)  Resp 16  Ht 5' 3\" (1.6 m)  Wt 273 lb (123.8 kg)  SpO2 95%  BMI 48.36 kg/m2      Physical Exam:  G: alert oriented, no acute distress   ENT: sclera non-icteric   Pulm: no respiratory distress   CVS: RRR  ABD: obese, mobile 2 cm mass in the RUQ near the costal edge.   Ext: 1 cm mobile mass in left antecubital fossa.      Assessment/Plan:  Exam consistent with lipoma, will plan on excisional biopsy in clinic today. The lipoma on her left arm is asymptomatic therefore do not feel need to remove.     Lazaro Pelletier     [facility]  Minor Procedure Note    Preoperative diagnosis:  Lipoma     Postoperative diagnosis:  Same     Procedure:  Excisional biopsy right upper quadrant     Anesthesia:  Local    History: see above     Findings:  Right upper quadrant soft tissue mass consistent with fat    Tissue to pathology:    Fatty tissue mass     Details:   The requisite time out pause observed during which the patient confirmed their identity, date of birth, side and site of the requested excision.  The region was prepped and draped sterilely; local anesthesia was obtained with infiltration of 15 cc of 2% lidocaine.  A transverse incision was made over the presenting portion of the mass and carried through full thickness skin.  Using combination of blunt " and sharp dissection the mass was delivered through the incision.  The wound was  closed in layers with interrupted 3-0 Vicryl in the subcutaneous tissue.  The skin was reapproximated with 4-0 monocryl in a subcuticular fashion.  Steri Strips and sterile dressings were applied.   The patient was observed in the treatment room for 15 minutes following the procedure without sequelae.  The procedure was well tolerated and the patient was discharged with wound care instructions and followup appointment.       Lazaro Pelletier

## 2018-09-07 LAB — COPATH REPORT: NORMAL

## 2018-09-11 DIAGNOSIS — R60.9 EDEMA, UNSPECIFIED TYPE: ICD-10-CM

## 2018-09-11 DIAGNOSIS — E78.5 HYPERLIPIDEMIA, UNSPECIFIED HYPERLIPIDEMIA TYPE: ICD-10-CM

## 2018-09-11 DIAGNOSIS — I10 BENIGN ESSENTIAL HYPERTENSION: ICD-10-CM

## 2018-09-11 LAB
ALBUMIN SERPL-MCNC: 3.4 G/DL (ref 3.4–5)
ALP SERPL-CCNC: 138 U/L (ref 40–150)
ALT SERPL W P-5'-P-CCNC: 29 U/L (ref 0–50)
ANION GAP SERPL CALCULATED.3IONS-SCNC: 13 MMOL/L (ref 3–14)
AST SERPL W P-5'-P-CCNC: 25 U/L (ref 0–45)
BILIRUB SERPL-MCNC: 0.2 MG/DL (ref 0.2–1.3)
BUN SERPL-MCNC: 19 MG/DL (ref 7–30)
CALCIUM SERPL-MCNC: 8.6 MG/DL (ref 8.5–10.1)
CHLORIDE SERPL-SCNC: 103 MMOL/L (ref 94–109)
CHOLEST SERPL-MCNC: 173 MG/DL
CO2 SERPL-SCNC: 23 MMOL/L (ref 20–32)
CREAT SERPL-MCNC: 0.84 MG/DL (ref 0.52–1.04)
GFR SERPL CREATININE-BSD FRML MDRD: 70 ML/MIN/1.7M2
GLUCOSE SERPL-MCNC: 91 MG/DL (ref 70–99)
HDLC SERPL-MCNC: 61 MG/DL
LDLC SERPL CALC-MCNC: 77 MG/DL
NONHDLC SERPL-MCNC: 112 MG/DL
POTASSIUM SERPL-SCNC: 4.2 MMOL/L (ref 3.4–5.3)
PROT SERPL-MCNC: 7.6 G/DL (ref 6.8–8.8)
SODIUM SERPL-SCNC: 139 MMOL/L (ref 133–144)
TRIGL SERPL-MCNC: 174 MG/DL

## 2018-09-11 PROCEDURE — 80061 LIPID PANEL: CPT | Mod: ZL | Performed by: FAMILY MEDICINE

## 2018-09-11 PROCEDURE — 80053 COMPREHEN METABOLIC PANEL: CPT | Mod: ZL | Performed by: FAMILY MEDICINE

## 2018-09-11 PROCEDURE — 36415 COLL VENOUS BLD VENIPUNCTURE: CPT | Mod: ZL | Performed by: FAMILY MEDICINE

## 2018-09-13 ENCOUNTER — TELEPHONE (OUTPATIENT)
Dept: FAMILY MEDICINE | Facility: OTHER | Age: 58
End: 2018-09-13

## 2018-09-13 DIAGNOSIS — H91.93 DECREASED HEARING OF BOTH EARS: Primary | ICD-10-CM

## 2018-09-13 NOTE — LETTER
September 13, 2018      Maite Suero  [address]  [facility] [address]        Dear ,    We are writing to inform you of your test results.    Your test results fall within the expected range(s) or remain unchanged from previous results.  Please continue with current treatment plan.    Results for orders placed or performed in visit on 09/11/18   Lipid Profile   Result Value Ref [facility]    Cholesterol 173 <200 mg/dL    Triglycerides 174 (H) <150 mg/dL    HDL Cholesterol 61 >49 mg/dL    LDL Cholesterol Calculated 77 <100 mg/dL    Non HDL Cholesterol 112 <130 mg/dL   Comprehensive metabolic panel (BMP + Alb, Alk Phos, ALT, AST, Total. Bili, TP)   Result Value Ref [facility]    Sodium 139 133 - 144 mmol/L    Potassium 4.2 3.4 - 5.3 mmol/L    Chloride 103 94 - 109 mmol/L    Carbon Dioxide 23 20 - 32 mmol/L    Anion Gap 13 3 - 14 mmol/L    Glucose 91 70 - 99 mg/dL    Urea Nitrogen 19 7 - 30 mg/dL    Creatinine 0.84 0.52 - 1.04 mg/dL    GFR Estimate 70 >60 mL/min/1.7m2    GFR Estimate If Black 84 >60 mL/min/1.7m2    Calcium 8.6 8.5 - 10.1 mg/dL    Bilirubin Total 0.2 0.2 - 1.3 mg/dL    Albumin 3.4 3.4 - 5.0 g/dL    Protein Total 7.6 6.8 - 8.8 g/dL    Alkaline Phosphatase 138 40 - 150 U/L    ALT 29 0 - 50 U/L    AST 25 0 - 45 U/L       If you have any questions or concerns, please call the clinic at the number listed above.       Sincerely,        Yessi Dorsey MD

## 2018-09-20 ENCOUNTER — TELEPHONE (OUTPATIENT)
Dept: INTERVENTIONAL RADIOLOGY/VASCULAR | Facility: HOSPITAL | Age: 58
End: 2018-09-20

## 2018-09-20 NOTE — TELEPHONE ENCOUNTER
STEROID INJECTION REMINDER CALL    Called to remind patient of appointment on 09/21/2018 at 1230.      Patient was not available by telephone. A message was not left due to the patient being new to the department.

## 2018-09-21 ENCOUNTER — HOSPITAL ENCOUNTER (OUTPATIENT)
Dept: INTERVENTIONAL RADIOLOGY/VASCULAR | Facility: HOSPITAL | Age: 58
Discharge: HOME | End: 2018-09-21
Attending: SURGERY | Admitting: SURGERY
Payer: MEDICARE

## 2018-09-21 DIAGNOSIS — L76.82 PAIN AT SURGICAL INCISION: ICD-10-CM

## 2018-09-21 DIAGNOSIS — M79.10 MYALGIA: ICD-10-CM

## 2018-09-21 PROCEDURE — 20552 NJX 1/MLT TRIGGER POINT 1/2: CPT | Mod: TC

## 2018-09-21 PROCEDURE — 25000128 H RX IP 250 OP 636: Performed by: RADIOLOGY

## 2018-09-21 PROCEDURE — 25000125 ZZHC RX 250: Performed by: RADIOLOGY

## 2018-09-21 RX ORDER — LIDOCAINE HYDROCHLORIDE 10 MG/ML
INJECTION, SOLUTION EPIDURAL; INFILTRATION; INTRACAUDAL; PERINEURAL
Status: DISCONTINUED
Start: 2018-09-21 | End: 2018-09-22 | Stop reason: HOSPADM

## 2018-09-21 RX ORDER — TRIAMCINOLONE ACETONIDE 40 MG/ML
INJECTION, SUSPENSION INTRA-ARTICULAR; INTRAMUSCULAR
Status: DISCONTINUED
Start: 2018-09-21 | End: 2018-09-22 | Stop reason: HOSPADM

## 2018-09-21 RX ORDER — TRIAMCINOLONE ACETONIDE 40 MG/ML
40 INJECTION, SUSPENSION INTRA-ARTICULAR; INTRAMUSCULAR ONCE
Status: COMPLETED | OUTPATIENT
Start: 2018-09-21 | End: 2018-09-21

## 2018-09-21 RX ORDER — LIDOCAINE HYDROCHLORIDE 10 MG/ML
10 INJECTION, SOLUTION INFILTRATION; PERINEURAL ONCE
Status: COMPLETED | OUTPATIENT
Start: 2018-09-21 | End: 2018-09-21

## 2018-09-21 RX ADMIN — TRIAMCINOLONE ACETONIDE 80 MG: 40 INJECTION, SUSPENSION INTRA-ARTICULAR; INTRAMUSCULAR at 12:51

## 2018-09-21 RX ADMIN — LIDOCAINE HYDROCHLORIDE 10 ML: 10 INJECTION, SOLUTION EPIDURAL; INFILTRATION; INTRACAUDAL; PERINEURAL at 12:52

## 2018-09-21 NOTE — IP AVS SNAPSHOT
[facility]    [address] [address]    Phone:  [phone]    Fax:  [phone]                                       After Visit Summary   9/21/2018    Maite Suero    MRN: [id number]           After Visit Summary Signature Page     I have received my discharge instructions, and my questions have been answered. I have discussed any challenges I see with this plan with the nurse or doctor.    ..........................................................................................................................................  Patient/Patient Representative Signature      ..........................................................................................................................................  Patient Representative Print Name and Relationship to Patient    ..................................................               ................................................  Date                                   Time    ..........................................................................................................................................  Reviewed by Signature/Title    ...................................................              ..............................................  Date                                               Time          22EPIC Rev 08/18

## 2018-09-21 NOTE — DISCHARGE INSTRUCTIONS
Home number on file [phone] (home)  Is it ok to leave a message at this number(s)? Yes    Dr Pugh completed your procedure on 9/21/2018.    Current Pain Level (0-10 Scale): 2/10  Post Pain Level (0-10):  0/10    Radiology Discharge instructions for Steroid Injection    Activity Level:     Do not do any heavy activity or exercise for 24 hours.   Do not drive for 4 hours after your injection.  Diet:   Return to your normal diet.  Medications:   If you have stopped taking your Aspirin, Coumadin/Warfarin, Ibuprofen, or any   other blood thinner for this procedure you may resume in the morning unless   your primary care provider has given you other instructions.    Diabetics may see an increase in blood sugar after steroid injections. If you are concerned about your blood sugar, please contact your family doctor.    Site Care:  Remove the bandage and bathe or shower the morning after the procedure.      Please allow two weeks to experience improvement in your pain.  If you have any further issues, please contact your provider.    Call your Primary Care Provider if you have the following (if your primary care provider is not available please seek emergency care):   Nausea with vomiting   Severe headache   Drowsiness or confusion   Redness or drainage at the injection or puncture site   Temperature over 101 degrees F   Other concerns   Worsening back pain   Stiff neck

## 2018-09-21 NOTE — IP AVS SNAPSHOT
MRN:[id number]                      After Visit Summary   9/21/2018    Maite Suero    MRN: [id number]           Visit Information        Provider Department      9/21/2018 12:30 PM HII[facility]AD; HIIR1 [facility] INTERVENTIONAL [facility]           Review of your medicines      UNREVIEWED medicines. Ask your doctor about these medicines        Dose / Directions    aspirin 81 MG EC tablet   Generic drug:  aspirin        Dose:  81 mg   Take 81 mg by mouth daily   Refills:  0       buPROPion 300 MG 24 hr tablet   Commonly known as:  WELLBUTRIN XL        Dose:  300 mg   Take 300 mg by mouth daily   Refills:  0       COLACE PO        Dose:  100 mg   Take 100 mg by mouth daily 1 tablet daily   Refills:  0       fluticasone 50 MCG/ACT spray   Commonly known as:  FLONASE   Used for:  Chronic rhinitis, unspecified type        SHAKE LIQUID AND USE 1 TO 2 SPRAYS IN EACH NOSTRIL DAILY   Quantity:  16 mL   Refills:  1       mirabegron 50 MG 24 hr tablet   Commonly known as:  MYRBETRIQ   Used for:  Mixed incontinence urge and stress (male)(female), OAB (overactive bladder)        Dose:  50 mg   Take 1 tablet (50 mg) by mouth daily   Quantity:  90 tablet   Refills:  3       omeprazole 20 MG CR capsule   Commonly known as:  priLOSEC   Used for:  Gastroesophageal reflux disease without esophagitis        TAKE 1 CAPSULE BY MOUTH TWICE DAILY   Quantity:  60 capsule   Refills:  11       RITALIN LA 20 MG Cp24   Generic drug:  methylphenidate        Dose:  80 mg   Take 80 mg by mouth daily   Refills:  0       rosuvastatin 20 MG tablet   Commonly known as:  CRESTOR   Used for:  Mixed hyperlipidemia        TAKE 1 TABLET BY MOUTH DAILY   Quantity:  90 tablet   Refills:  3       ROZEREM 8 MG tablet   Used for:  Insomnia, unspecified type   Generic drug:  ramelteon        TAKE 1 TABLET BY MOUTH EVERY NIGHT AT BEDTIME   Quantity:  30 tablet   Refills:  3       sertraline 100 MG tablet   Commonly known as:  ZOLOFT        Dose:  2 tablet    Take 2 tablets by mouth daily   Refills:  0       vitamin D 2000 units Caps        Dose:  1 capsule   Take 1 capsule by mouth daily   Refills:  0                Protect others around you: Learn how to safely use, store and throw away your medicines at www.disposemymeds.org.         Follow-ups after your visit        Your next 10 appointments already scheduled     Oct 16, 2018  1:00 PM CDT   (Arrive by 12:45 PM)   Hearing Eval with Kelin Love, [place]   [facility] [place] ([facility] [place] )    [address]  [place] [address]   [phone]            Oct 16, 2018  2:00 PM CDT   (Arrive by 1:45 PM)   MA SCREENING BILATERAL W/ GREG with HCMA1   [facility] [place] ([facility] [place] )    [address]  [place] [address]   [phone]           How do I prepare for my exam? (Food and drink instructions) No Food and Drink Restrictions.  How do I prepare for my exam? (Other instructions) Do not use any powder, lotion or deodorant under your arms or on your breast. If you do, we will ask you to remove it before your exam.  What should I wear: Wear comfortable, two-piece clothing.  How long does the exam take: Most scans will take 15 minutes.  What should I bring: Bring any previous mammograms from other facilities or have them mailed to the breast center.  Do I need a :  No  is needed.  What do I need to tell my doctor: If you have any allergies, tell your care team.  What should I do after the exam: No restrictions, You may resume normal activities.  What is this test: This test is an x-ray of the breast to look for breast disease. The breast is pressed between two plates to flatten and spread the tissue. An X-ray is taken of the breast from different angles.  Who should I call with questions: If you have any questions, please call the Imaging Department where you will have your exam. Directions, parking instructions, and other information  "is available on our website, [email or web address].  Other information about my exam Three-dimensional (3D) mammograms are available at [facility] locations in [facility], [place], [place], [facility], [place], and [place]. [facility] locations include [place] and the Clinics and Surgery Center in [place].  Benefits of 3D mammograms include: * Improved rate of cancer detection * Decreases your chance of having to go back for more tests, which means fewer: * \"False-positive\" results (This means that there is an abnormal area but it isn't cancer.) * Invasive testing procedures, such as a biopsy or surgery * Can provide clearer images of the breast if you have dense breast tissue.  *3D mammography is an optional exam that anyone can have with a 2D mammogram. It doesn't replace or take the place of a 2D mammogram. 2D mammograms remain an effective screening test for all women.  Not all insurance companies cover the cost of a 3D mammogram. Check with your insurance.            Mar 05, 2019  9:45 AM CST   (Arrive by 9:30 AM)   SHORT with Yessi Dorsey MD   [facility] ([facility] )    [address]  [facility] [address]   [phone]               Care Instructions        Further instructions from your care team       Home number on file [phone] (home)  Is it ok to leave a message at this number(s)? Yes    Dr Pugh completed your procedure on 9/21/2018.    Current Pain Level (0-10 Scale): 2/10  Post Pain Level (0-10):  0/10    Radiology Discharge instructions for Steroid Injection    Activity Level:     Do not do any heavy activity or exercise for 24 hours.   Do not drive for 4 hours after your injection.  Diet:   Return to your normal diet.  Medications:   If you have stopped taking your Aspirin, Coumadin/Warfarin, Ibuprofen, or any   other blood thinner for this procedure you may resume in the morning unless   your primary care " "provider has given you other instructions.    Diabetics may see an increase in blood sugar after steroid injections. If you are concerned about your blood sugar, please contact your family doctor.    Site Care:  Remove the bandage and bathe or shower the morning after the procedure.      Please allow two weeks to experience improvement in your pain.  If you have any further issues, please contact your provider.    Call your Primary Care Provider if you have the following (if your primary care provider is not available please seek emergency care):   Nausea with vomiting   Severe headache   Drowsiness or confusion   Redness or drainage at the injection or puncture site   Temperature over 101 degrees F   Other concerns   Worsening back pain   Stiff neck           Additional Information About Your Visit        [employer] Information     [facility] [employer] lets you send messages to your doctor, view your test results, renew your prescriptions, schedule appointments and more. To sign up, go to [email or web address] . Click on \"Log in\" on the left side of the screen, which will take you to the Welcome page. Then click on \"Sign up Now\" on the right side of the page.     You will be asked to enter the access code listed below, as well as some personal information. Please follow the directions to create your username and password.     Your access code is: [email or web address]  Expires: 2018 11:26 AM     Your access code will  in 90 days. If you need help or a new code, please call your [facility] clinic or [phone].        Care EveryWhere ID     This is your Care EveryWhere ID. This could be used by other organizations to access your [facility] medical records  [id number]         Primary Care Provider Office Phone # Fax #    Yessi Dorsey [phone]      Equal Access to Services     EVE GONZALEZ AH: Aakash Webster, yves ramon, michael vela " la'jilliann wanda. So [facility] [phone].    ATENCIÓN: Si habla van, tiene a gaviria disposición servicios gratuitos de asistencia lingüística. Olegario al [phone].    We comply with applicable federal civil rights laws and [place] laws. We do not discriminate on the basis of race, color, national origin, age, disability, sex, sexual orientation, or gender identity.            Thank you!     Thank you for choosing [facility] for your care. Our goal is always to provide you with excellent care. Hearing back from our patients is one way we can continue to improve our services. Please take a few minutes to complete the written survey that you may receive in the mail after you visit with us. Thank you!             Medication List: This is a list of all your medications and when to take them. Check marks below indicate your daily home schedule. Keep this list as a reference.      Medications           Morning Afternoon Evening Bedtime As Needed    aspirin 81 MG EC tablet   Take 81 mg by mouth daily   Generic drug:  aspirin                                buPROPion 300 MG 24 hr tablet   Commonly known as:  WELLBUTRIN XL   Take 300 mg by mouth daily                                COLACE PO   Take 100 mg by mouth daily 1 tablet daily                                fluticasone 50 MCG/ACT spray   Commonly known as:  FLONASE   SHAKE LIQUID AND USE 1 TO 2 SPRAYS IN EACH NOSTRIL DAILY                                mirabegron 50 MG 24 hr tablet   Commonly known as:  MYRBETRIQ   Take 1 tablet (50 mg) by mouth daily                                omeprazole 20 MG CR capsule   Commonly known as:  priLOSEC   TAKE 1 CAPSULE BY MOUTH TWICE DAILY                                RITALIN LA 20 MG Cp24   Take 80 mg by mouth daily   Generic drug:  methylphenidate                                rosuvastatin 20 MG tablet   Commonly known as:  CRESTOR   TAKE 1 TABLET BY MOUTH DAILY                                ROZEREM 8 MG tablet   TAKE 1 TABLET BY  MOUTH EVERY NIGHT AT BEDTIME   Generic drug:  ramelteon                                sertraline 100 MG tablet   Commonly known as:  ZOLOFT   Take 2 tablets by mouth daily                                vitamin D 2000 units Caps   Take 1 capsule by mouth daily

## 2018-10-09 ENCOUNTER — TELEPHONE (OUTPATIENT)
Dept: INTERVENTIONAL RADIOLOGY/VASCULAR | Facility: HOSPITAL | Age: 58
End: 2018-10-09

## 2018-10-09 NOTE — TELEPHONE ENCOUNTER
[unfilled]    INJECTION POST CALL    Procedure: Trigger point  Radiologist(s): Dr. Amrit Pugh  Date of Procedure: 09/21/2018    I responded to the patient's questions/concerns.    Pre-procedure pain score was: 2 (See pre-procedure score)  Post-procedure pain score as of today is: 1 not constant  Percentage of pain reduction: 50%  Where is the pain located? Above pubic bone  Is the pain radiating? No    Is this new pain? No    Patient will contact the provider if there are any issues.      TIMMY BOYD

## 2019-01-07 DIAGNOSIS — R60.9 EDEMA, UNSPECIFIED TYPE: ICD-10-CM

## 2019-01-07 DIAGNOSIS — G47.00 INSOMNIA, UNSPECIFIED TYPE: ICD-10-CM

## 2019-01-09 RX ORDER — RAMELTEON 8 MG/1
TABLET, FILM COATED ORAL
Qty: 30 TABLET | Refills: 1 | Status: SHIPPED | OUTPATIENT
Start: 2019-01-09 | End: 2019-03-29

## 2019-01-09 RX ORDER — FUROSEMIDE 20 MG
TABLET ORAL
Qty: 90 TABLET | Refills: 1 | Status: SHIPPED | OUTPATIENT
Start: 2019-01-09 | End: 2019-03-05

## 2019-01-09 NOTE — TELEPHONE ENCOUNTER
ROZEREM 8 MG tablet   Last Written Prescription Date:  8/30/18  Last Fill Quantity: 30,   # refills: 3  Last Office Visit: 8/28/18  Future Office visit:    Next 5 appointments (look out 90 days)    Mar 05, 2019  9:45 AM CST  (Arrive by 9:30 AM)  SHORT with Yessi Dorsey MD  [facility] ([facility] ) [address]  [place] [address]  [phone]         Lasix       Last Written Prescription Date:  Med not on list   Last Fill Quantity: 0,   # refills: 0  Last Office Visit: 8/28/18  Future Office visit:    Next 5 appointments (look out 90 days)    Mar 05, 2019  9:45 AM CST  (Arrive by 9:30 AM)  SHORT with Yessi Dorsey MD  [facility] ([facility] ) [address]  [place] [address]  [phone]           Routing refill request to provider for review/approval because:  Drug not active on patient's medication list

## 2019-01-11 NOTE — PROGRESS NOTES
S:    Patient is complaining of continuing pain of her Pfannenstiel  surgical scar for a Hysterectomy that was done for and ovarian cyst and pelvic pain at a  Hospital          [facility]  In [id number] time frame.          The pain is bothering her to the extent that sexual intercourse is uncomfortable.          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             O:   Not in acute distress.    Afebrile         No pallor no cyanosis  No jaundice         Abdomen soft   No mass   Liver, spleen and kidneys not palpable  Not distended   No guarding         She has a Pfannenstiel incision that is low in the supra pubic region         This wound is slightly tender on palpation.          No cough impulse is detected.    A:    Persistent pain in surgical scar since surgery [id number].           Surgical scar pain is becoming more disturbing and is interfering with sexual functioning.    P:    Refer to general surgery for possible scar revision.   [FreeTextEntry1] : Assess:\par Lumbar neurogenic claudication/ Lumbar back pain\par Cervical Stenosis\par Myelopathic gait\par left arm radicular pain \par \par PLAN:\par Proposed  surgical intervention of a C 3 to C 7 laminectomy \par with C 2 to T 2 fusion\par Risk and benefits explained

## 2019-03-04 DIAGNOSIS — R60.9 EDEMA, UNSPECIFIED TYPE: ICD-10-CM

## 2019-03-04 DIAGNOSIS — E78.2 MIXED HYPERLIPIDEMIA: ICD-10-CM

## 2019-03-04 NOTE — PROGRESS NOTES
SUBJECTIVE:   Maite Suero is a 58 year old female who presents to clinic today for the following health issues:      Hyperlipidemia Follow-Up      Rate your low fat/cholesterol diet?: not monitoring fat    Taking statin?  Yes, no muscle aches from statin    Other lipid medications/supplements?:  none    Hypertension Follow-up      Outpatient blood pressures are not being checked.    Low Salt Diet: no added salt      Amount of exercise or physical activity: 2-3 days/week for an average of 15-30 minutes    Problems taking medications regularly: No    Medication side effects: none    Diet: regular (no restrictions)        Musculoskeletal problem/pain      Duration: does not remember, probably months    Description  Location: middle finger left hand    Intensity:  mild    Accompanying signs and symptoms: none    History  Previous similar problem: no   Previous evaluation:  none    Precipitating or alleviating factors:  Trauma or overuse: YES- fell on it awhile ago does not remember when   Aggravating factors include: overuse    Therapies tried and outcome: just leaving it alone makes it better    After patient had labs drawn as below, she mentions that she was not able to donate blood this week due to anemia.  She states her hemoglobin was checked twice, once was around 10 and the recheck was in the high 9's.      Problem list and histories reviewed & adjusted, as indicated.  Additional history: as documented    Patient Active Problem List   Diagnosis     Benign essential hypertension     Hyperlipidemia     Major depressive disorder, recurrent episode (H)     Ankle sprain     Weight gain     GERD (gastroesophageal reflux disease)     Low back pain     Cervicalgia (NECK)     Urinary, incontinence, stress female     Irritable bowel syndrome     Morbid obesity (H)     Dissociative identity disorder (H)     Vitamin D deficiency     OAB (overactive bladder)     ACP (advance care planning)     Mixed stress and urge  urinary incontinence     Past Surgical History:   Procedure Laterality Date     CHOLECYSTECTOMY       COLONOSCOPY N/A 10/9/2014    Procedure: COLONOSCOPY;  Surgeon: Vandana Daniels MD;  Location: [facility]     HYSTERECTOMY TOTAL ABDOMINAL, BILATERAL SALPINGO-OOPHORECTOMY, COMBINED      no cervix; fibroids       Social History     Tobacco Use     Smoking status: Never Smoker     Smokeless tobacco: Never Used   Substance Use Topics     Alcohol use: No     Family History   Problem Relation Age of Onset     Psychotic Disorder Mother      Psychotic Disorder Father      Psychotic Disorder Brother      Psychotic Disorder Sister          Current Outpatient Medications   Medication Sig Dispense Refill     aspirin (ASPIRIN) 81 MG EC tablet Take 81 mg by mouth daily        buPROPion (WELLBUTRIN XL) 300 MG 24 hr tablet Take 300 mg by mouth daily        Cholecalciferol (VITAMIN D) 2000 UNITS CAPS Take 1 capsule by mouth daily       Docusate Sodium (COLACE PO) Take 100 mg by mouth daily 1 tablet daily       methylphenidate (RITALIN LA) 20 MG CP24 Take 80 mg by mouth daily       mirabegron (MYRBETRIQ) 50 MG 24 hr tablet Take 1 tablet (50 mg) by mouth daily 90 tablet 3     omeprazole (PRILOSEC) 20 MG CR capsule TAKE 1 CAPSULE BY MOUTH TWICE DAILY 60 capsule 11     ROZEREM 8 MG tablet TAKE 1 TABLET BY MOUTH EVERY NIGHT AT BEDTIME 30 tablet 1     sertraline (ZOLOFT) 100 MG tablet Take 2 tablets by mouth daily       furosemide (LASIX) 20 MG tablet TAKE 2 TABLETS BY MOUTH EVERY MORNING AND 1 TABLET BY MOUTH EVERY DAY AT NOON 90 tablet 3     rosuvastatin (CRESTOR) 20 MG tablet TAKE 1 TABLET BY MOUTH DAILY 90 tablet 2     Allergies   Allergen Reactions     Shellfish Allergy Anaphylaxis     Ambien [Zolpidem] Other (See Comments)     Pt states she was baking food in her sleep     Pork Allergy      Seasonal      Povidone Iodine Rash       Reviewed and updated as needed this visit by clinical staff       Reviewed and updated as needed this  "visit by Provider         ROS:  Constitutional, HEENT, cardiovascular, pulmonary, gi and gu systems are negative, except as otherwise noted.    OBJECTIVE:     /72 (BP Location: Left arm, Patient Position: Chair, Cuff Size: Adult Large)   Pulse 78   Temp 97.4  F (36.3  C) (Tympanic)   Resp 18   Ht 1.6 m (5' 3\")   Wt 131.5 kg (290 lb)   SpO2 94%   BMI 51.37 kg/m    Body mass index is 51.37 kg/m .  GENERAL: healthy, alert and no distress  MS: mild swelling by PIP joint of left middle finger  PSYCH: mentation appears normal, affect normal/bright    Diagnostic Test Results:  Xray - negative for fracture or dislocation    ASSESSMENT/PLAN:     Hyperlipidemia; controlled   Plan:  Labs:   CMP and Lipid    Hypertension; controlled   Associated with the following complications:    None   Plan:  Labs:   CMP        ICD-10-CM    1. Hyperlipidemia, unspecified hyperlipidemia type E78.5 Lipid Profile     Comprehensive metabolic panel   2. Benign essential hypertension I10 Comprehensive metabolic panel   3. Pain of finger of left hand M79.645 XR Finger Left G/E 2 Views   4. Anemia, unspecified type D64.9 CBC with platelets       FUTURE LABS:       - Schedule a non-fasting blood draw early next week to recheck Hgb  FUTURE APPOINTMENTS:       - Follow-up visit in 6 months, sooner as needed.      Yessi Dorsey MD  [facility]  "

## 2019-03-05 ENCOUNTER — ANCILLARY PROCEDURE (OUTPATIENT)
Dept: GENERAL RADIOLOGY | Facility: OTHER | Age: 59
End: 2019-03-05
Attending: FAMILY MEDICINE
Payer: COMMERCIAL

## 2019-03-05 ENCOUNTER — OFFICE VISIT (OUTPATIENT)
Dept: FAMILY MEDICINE | Facility: OTHER | Age: 59
End: 2019-03-05
Attending: FAMILY MEDICINE
Payer: COMMERCIAL

## 2019-03-05 VITALS
SYSTOLIC BLOOD PRESSURE: 108 MMHG | WEIGHT: 290 LBS | HEIGHT: 63 IN | BODY MASS INDEX: 51.38 KG/M2 | HEART RATE: 78 BPM | RESPIRATION RATE: 18 BRPM | OXYGEN SATURATION: 94 % | TEMPERATURE: 97.4 F | DIASTOLIC BLOOD PRESSURE: 72 MMHG

## 2019-03-05 DIAGNOSIS — E78.5 HYPERLIPIDEMIA, UNSPECIFIED HYPERLIPIDEMIA TYPE: Primary | ICD-10-CM

## 2019-03-05 DIAGNOSIS — I10 BENIGN ESSENTIAL HYPERTENSION: ICD-10-CM

## 2019-03-05 DIAGNOSIS — M79.645 PAIN OF FINGER OF LEFT HAND: ICD-10-CM

## 2019-03-05 DIAGNOSIS — D64.9 ANEMIA, UNSPECIFIED TYPE: ICD-10-CM

## 2019-03-05 LAB
ALBUMIN SERPL-MCNC: 3.5 G/DL (ref 3.4–5)
ALP SERPL-CCNC: 125 U/L (ref 40–150)
ALT SERPL W P-5'-P-CCNC: 25 U/L (ref 0–50)
ANION GAP SERPL CALCULATED.3IONS-SCNC: 5 MMOL/L (ref 3–14)
AST SERPL W P-5'-P-CCNC: 26 U/L (ref 0–45)
BILIRUB SERPL-MCNC: 0.3 MG/DL (ref 0.2–1.3)
BUN SERPL-MCNC: 17 MG/DL (ref 7–30)
CALCIUM SERPL-MCNC: 8.3 MG/DL (ref 8.5–10.1)
CHLORIDE SERPL-SCNC: 107 MMOL/L (ref 94–109)
CHOLEST SERPL-MCNC: 168 MG/DL
CO2 SERPL-SCNC: 26 MMOL/L (ref 20–32)
CREAT SERPL-MCNC: 0.79 MG/DL (ref 0.52–1.04)
GFR SERPL CREATININE-BSD FRML MDRD: 82 ML/MIN/{1.73_M2}
GLUCOSE SERPL-MCNC: 96 MG/DL (ref 70–99)
HDLC SERPL-MCNC: 65 MG/DL
LDLC SERPL CALC-MCNC: 73 MG/DL
NONHDLC SERPL-MCNC: 103 MG/DL
POTASSIUM SERPL-SCNC: 4.2 MMOL/L (ref 3.4–5.3)
PROT SERPL-MCNC: 7.5 G/DL (ref 6.8–8.8)
SODIUM SERPL-SCNC: 138 MMOL/L (ref 133–144)
TRIGL SERPL-MCNC: 148 MG/DL

## 2019-03-05 PROCEDURE — 36415 COLL VENOUS BLD VENIPUNCTURE: CPT | Mod: ZL | Performed by: FAMILY MEDICINE

## 2019-03-05 PROCEDURE — G0463 HOSPITAL OUTPT CLINIC VISIT: HCPCS

## 2019-03-05 PROCEDURE — 73140 X-RAY EXAM OF FINGER(S): CPT | Mod: TC,LT,FY

## 2019-03-05 PROCEDURE — G0463 HOSPITAL OUTPT CLINIC VISIT: HCPCS | Mod: 25

## 2019-03-05 PROCEDURE — 80061 LIPID PANEL: CPT | Mod: ZL | Performed by: FAMILY MEDICINE

## 2019-03-05 PROCEDURE — 99214 OFFICE O/P EST MOD 30 MIN: CPT | Performed by: FAMILY MEDICINE

## 2019-03-05 PROCEDURE — 80053 COMPREHEN METABOLIC PANEL: CPT | Mod: ZL | Performed by: FAMILY MEDICINE

## 2019-03-05 RX ORDER — ROSUVASTATIN CALCIUM 20 MG/1
TABLET, COATED ORAL
Qty: 90 TABLET | Refills: 2 | Status: SHIPPED | OUTPATIENT
Start: 2019-03-05 | End: 2020-05-04

## 2019-03-05 ASSESSMENT — MIFFLIN-ST. JEOR: SCORE: 1864.56

## 2019-03-05 ASSESSMENT — PAIN SCALES - GENERAL: PAINLEVEL: NO PAIN (0)

## 2019-03-05 NOTE — TELEPHONE ENCOUNTER
furosemide  Last Written Prescription Date:  1/9/19 discontinued today, note indicates therapy complete  Last Fill Qty:  90, # Refills:  1  Last Office Visit:  Today

## 2019-03-05 NOTE — NURSING NOTE
"Chief Complaint   Patient presents with     Hypertension     Lipids       Initial /72 (BP Location: Left arm, Patient Position: Chair, Cuff Size: Adult Large)   Pulse 78   Temp 97.4  F (36.3  C) (Tympanic)   Resp 18   Ht 1.6 m (5' 3\")   Wt 131.5 kg (290 lb)   SpO2 94%   BMI 51.37 kg/m   Estimated body mass index is 51.37 kg/m  as calculated from the following:    Height as of this encounter: 1.6 m (5' 3\").    Weight as of this encounter: 131.5 kg (290 lb).  Medication Reconciliation: complete    Pamela M. Lechevalier, LPN    "

## 2019-03-05 NOTE — LETTER
March 6, 2019      Maite Suero  [address]  [place] [address]        Dear ,    We are writing to inform you of your test results.    Your test results fall within the expected range(s) or remain unchanged from previous results.  Please continue with current treatment plan.    Resulted Orders   Lipid Profile   Result Value Ref Range    Cholesterol 168 <200 mg/dL    Triglycerides 148 <150 mg/dL      Comment:      Fasting specimen    HDL Cholesterol 65 >49 mg/dL    LDL Cholesterol Calculated 73 <100 mg/dL      Comment:      Desirable:       <100 mg/dl    Non HDL Cholesterol 103 <130 mg/dL   Comprehensive metabolic panel   Result Value Ref Range    Sodium 138 133 - 144 mmol/L    Potassium 4.2 3.4 - 5.3 mmol/L    Chloride 107 94 - 109 mmol/L    Carbon Dioxide 26 20 - 32 mmol/L    Anion Gap 5 3 - 14 mmol/L    Glucose 96 70 - 99 mg/dL      Comment:      Fasting specimen    Urea Nitrogen 17 7 - 30 mg/dL    Creatinine 0.79 0.52 - 1.04 mg/dL    GFR Estimate 82 >60 mL/min/[1.73_m2]      Comment:      Non  GFR Calc  Starting 12/18/2018, serum creatinine based estimated GFR (eGFR) will be   calculated using the Chronic Kidney Disease Epidemiology Collaboration   (CKD-EPI) equation.      GFR Estimate If Black >90 >60 mL/min/[1.73_m2]      Comment:       GFR Calc  Starting 12/18/2018, serum creatinine based estimated GFR (eGFR) will be   calculated using the Chronic Kidney Disease Epidemiology Collaboration   (CKD-EPI) equation.      Calcium 8.3 (L) 8.5 - 10.1 mg/dL    Bilirubin Total 0.3 0.2 - 1.3 mg/dL    Albumin 3.5 3.4 - 5.0 g/dL    Protein Total 7.5 6.8 - 8.8 g/dL    Alkaline Phosphatase 125 40 - 150 U/L    ALT 25 0 - 50 U/L    AST 26 0 - 45 U/L       If you have any questions or concerns, please call the clinic at the number listed above.       Sincerely,        Yessi Dorsey MD

## 2019-03-06 RX ORDER — FUROSEMIDE 20 MG
TABLET ORAL
Qty: 90 TABLET | Refills: 3 | Status: SHIPPED | OUTPATIENT
Start: 2019-03-06 | End: 2019-03-19

## 2019-03-13 DIAGNOSIS — D64.9 ANEMIA, UNSPECIFIED TYPE: ICD-10-CM

## 2019-03-13 LAB
ERYTHROCYTE [DISTWIDTH] IN BLOOD BY AUTOMATED COUNT: 16 % (ref 10–15)
HCT VFR BLD AUTO: 34.8 % (ref 35–47)
HGB BLD-MCNC: 10.7 G/DL (ref 11.7–15.7)
MCH RBC QN AUTO: 24.7 PG (ref 26.5–33)
MCHC RBC AUTO-ENTMCNC: 30.7 G/DL (ref 31.5–36.5)
MCV RBC AUTO: 80 FL (ref 78–100)
PLATELET # BLD AUTO: 306 10E9/L (ref 150–450)
RBC # BLD AUTO: 4.33 10E12/L (ref 3.8–5.2)
WBC # BLD AUTO: 6.8 10E9/L (ref 4–11)

## 2019-03-13 PROCEDURE — 85027 COMPLETE CBC AUTOMATED: CPT | Mod: ZL | Performed by: FAMILY MEDICINE

## 2019-03-13 PROCEDURE — 36415 COLL VENOUS BLD VENIPUNCTURE: CPT | Mod: ZL | Performed by: FAMILY MEDICINE

## 2019-03-14 DIAGNOSIS — D64.9 ANEMIA, UNSPECIFIED TYPE: ICD-10-CM

## 2019-03-14 DIAGNOSIS — D64.9 ANEMIA, UNSPECIFIED TYPE: Primary | ICD-10-CM

## 2019-03-14 LAB
BASOPHILS # BLD AUTO: 0 10E9/L (ref 0–0.2)
BASOPHILS NFR BLD AUTO: 0.4 %
DIFFERENTIAL METHOD BLD: ABNORMAL
EOSINOPHIL # BLD AUTO: 0.2 10E9/L (ref 0–0.7)
EOSINOPHIL NFR BLD AUTO: 2.8 %
ERYTHROCYTE [DISTWIDTH] IN BLOOD BY AUTOMATED COUNT: 16.1 % (ref 10–15)
FERRITIN SERPL-MCNC: 7 NG/ML (ref 8–252)
FOLATE SERPL-MCNC: 6.8 NG/ML
HCT VFR BLD AUTO: 33.6 % (ref 35–47)
HGB BLD-MCNC: 10.2 G/DL (ref 11.7–15.7)
IRON SATN MFR SERPL: 5 % (ref 15–46)
IRON SERPL-MCNC: 24 UG/DL (ref 35–180)
LYMPHOCYTES # BLD AUTO: 1.7 10E9/L (ref 0.8–5.3)
LYMPHOCYTES NFR BLD AUTO: 20.5 %
MCH RBC QN AUTO: 24.8 PG (ref 26.5–33)
MCHC RBC AUTO-ENTMCNC: 30.4 G/DL (ref 31.5–36.5)
MCV RBC AUTO: 82 FL (ref 78–100)
MONOCYTES # BLD AUTO: 0.6 10E9/L (ref 0–1.3)
MONOCYTES NFR BLD AUTO: 7.6 %
NEUTROPHILS # BLD AUTO: 5.7 10E9/L (ref 1.6–8.3)
NEUTROPHILS NFR BLD AUTO: 68.7 %
PLATELET # BLD AUTO: 278 10E9/L (ref 150–450)
RBC # BLD AUTO: 4.11 10E12/L (ref 3.8–5.2)
RETICS # AUTO: 80.7 10E9/L (ref 25–95)
RETICS/RBC NFR AUTO: 1.9 % (ref 0.5–2)
TIBC SERPL-MCNC: 441 UG/DL (ref 240–430)
VIT B12 SERPL-MCNC: 426 PG/ML (ref 193–986)
WBC # BLD AUTO: 8.3 10E9/L (ref 4–11)

## 2019-03-14 PROCEDURE — 82728 ASSAY OF FERRITIN: CPT | Mod: ZL | Performed by: FAMILY MEDICINE

## 2019-03-14 PROCEDURE — 82607 VITAMIN B-12: CPT | Mod: ZL | Performed by: FAMILY MEDICINE

## 2019-03-14 PROCEDURE — 82746 ASSAY OF FOLIC ACID SERUM: CPT | Mod: ZL | Performed by: FAMILY MEDICINE

## 2019-03-14 PROCEDURE — 36415 COLL VENOUS BLD VENIPUNCTURE: CPT | Mod: ZL | Performed by: FAMILY MEDICINE

## 2019-03-14 PROCEDURE — 40000847 ZZHCL STATISTIC MORPHOLOGY W/INTERP HISTOLOGY TC 85060: Mod: ZL | Performed by: FAMILY MEDICINE

## 2019-03-14 PROCEDURE — 99000 SPECIMEN HANDLING OFFICE-LAB: CPT | Performed by: FAMILY MEDICINE

## 2019-03-14 PROCEDURE — 85045 AUTOMATED RETICULOCYTE COUNT: CPT | Mod: ZL | Performed by: FAMILY MEDICINE

## 2019-03-14 PROCEDURE — 83540 ASSAY OF IRON: CPT | Mod: ZL | Performed by: FAMILY MEDICINE

## 2019-03-14 PROCEDURE — 83550 IRON BINDING TEST: CPT | Mod: ZL | Performed by: FAMILY MEDICINE

## 2019-03-14 PROCEDURE — 85025 COMPLETE CBC W/AUTO DIFF WBC: CPT | Mod: ZL | Performed by: FAMILY MEDICINE

## 2019-03-15 LAB — COPATH REPORT: NORMAL

## 2019-03-18 ENCOUNTER — TELEPHONE (OUTPATIENT)
Dept: FAMILY MEDICINE | Facility: OTHER | Age: 59
End: 2019-03-18

## 2019-03-18 DIAGNOSIS — D64.9 ANEMIA, UNSPECIFIED TYPE: ICD-10-CM

## 2019-03-18 PROCEDURE — 82274 ASSAY TEST FOR BLOOD FECAL: CPT | Mod: ZL | Performed by: FAMILY MEDICINE

## 2019-03-18 NOTE — TELEPHONE ENCOUNTER
11:27 AM    Reason for Call: OVERBOOK    Patient is having the following symptoms: Pain on bottom after a fall    The patient is requesting an appointment for today or tomorrow  with  Dr. Campo    Was an appointment offered for this call? No    Preferred method for responding to this message: [phone]    If we cannot reach you directly, may we leave a detailed response at the number you provided?  Yes      Deysi Macias

## 2019-03-19 ENCOUNTER — OFFICE VISIT (OUTPATIENT)
Dept: FAMILY MEDICINE | Facility: OTHER | Age: 59
End: 2019-03-19
Attending: FAMILY MEDICINE
Payer: COMMERCIAL

## 2019-03-19 ENCOUNTER — ANCILLARY PROCEDURE (OUTPATIENT)
Dept: GENERAL RADIOLOGY | Facility: OTHER | Age: 59
End: 2019-03-19
Attending: FAMILY MEDICINE
Payer: COMMERCIAL

## 2019-03-19 VITALS
DIASTOLIC BLOOD PRESSURE: 78 MMHG | OXYGEN SATURATION: 97 % | HEART RATE: 79 BPM | TEMPERATURE: 97.2 F | SYSTOLIC BLOOD PRESSURE: 116 MMHG

## 2019-03-19 DIAGNOSIS — S30.0XXA PELVIC CONTUSION, INITIAL ENCOUNTER: Primary | ICD-10-CM
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
## 2019-03-19 DIAGNOSIS — R10.2 PAIN IN PELVIS: ICD-10-CM

## 2019-03-19 DIAGNOSIS — M53.3 PAIN IN THE COCCYX: ICD-10-CM

## 2019-03-19 LAB — HEMOCCULT SP1 STL QL: NEGATIVE

## 2019-03-19 PROCEDURE — G0463 HOSPITAL OUTPT CLINIC VISIT: HCPCS

## 2019-03-19 PROCEDURE — 99213 OFFICE O/P EST LOW 20 MIN: CPT | Performed by: FAMILY MEDICINE

## 2019-03-19 PROCEDURE — 72170 X-RAY EXAM OF PELVIS: CPT | Mod: TC,FY

## 2019-03-19 ASSESSMENT — PAIN SCALES - GENERAL: PAINLEVEL: MODERATE PAIN (5)

## 2019-03-19 NOTE — PATIENT INSTRUCTIONS

## 2019-03-19 NOTE — NURSING NOTE
"Chief Complaint   Patient presents with     Musculoskeletal Problem     Fall Follow Up       Initial /78 (BP Location: Right arm, Patient Position: Sitting, Cuff Size: Adult Large)   Pulse 79   Temp 97.2  F (36.2  C) (Tympanic)   SpO2 97%  Estimated body mass index is 51.37 kg/m  as calculated from the following:    Height as of 3/5/19: 1.6 m (5' 3\").    Weight as of 3/5/19: 131.5 kg (290 lb).  Medication Reconciliation: complete    Alycia Alberts LPN  "

## 2019-03-20 DIAGNOSIS — D50.8 IRON DEFICIENCY ANEMIA SECONDARY TO INADEQUATE DIETARY IRON INTAKE: Primary | ICD-10-CM

## 2019-03-20 RX ORDER — FERROUS SULFATE 325(65) MG
325 TABLET, DELAYED RELEASE (ENTERIC COATED) ORAL 2 TIMES DAILY
Qty: 60 TABLET | Refills: 2 | Status: SHIPPED | OUTPATIENT
Start: 2019-03-20

## 2019-03-29 DIAGNOSIS — G47.00 INSOMNIA, UNSPECIFIED TYPE: ICD-10-CM

## 2019-03-29 RX ORDER — RAMELTEON 8 MG/1
TABLET, FILM COATED ORAL
Qty: 30 TABLET | Refills: 2 | Status: SHIPPED | OUTPATIENT
Start: 2019-03-29 | End: 2019-06-24

## 2019-05-10 NOTE — ED NOTES
Pt in for complaints of constant dizziness that is unrelated to position or position changes over the last 2 days. Pt reports hx of vertigo but states this is worse than it has been in the past. No complaints of chest pain, sob.   yes

## 2019-05-15 ENCOUNTER — TELEPHONE (OUTPATIENT)
Dept: FAMILY MEDICINE | Facility: OTHER | Age: 59
End: 2019-05-15

## 2019-05-15 NOTE — TELEPHONE ENCOUNTER
9:51 AM    Reason for Call: OVERBOOK    Patient is having the following symptoms: pain from fall for 1-2  days.    The patient is requesting an appointment for Requesting appt for next week on Tues 5/21/19 or  Thu 05/23/19 with Dr. Campo.    Was an appointment offered for this call? Yes  If yes : Appointment type              Date 0/7/09/19 pt declined does not want to wait that long     Preferred method for responding to this message: Telephone Call  What is your phone number ? [phone]    If we cannot reach you directly, may we leave a detailed response at the number you provided? Yes    Can this message wait until your PCP/provider returns, if unavailable today? YES, appt requested for next week.    Shalini Hair

## 2019-05-16 ENCOUNTER — OFFICE VISIT (OUTPATIENT)
Dept: FAMILY MEDICINE | Facility: OTHER | Age: 59
End: 2019-05-16
Attending: FAMILY MEDICINE
Payer: COMMERCIAL

## 2019-05-16 VITALS
HEART RATE: 78 BPM | DIASTOLIC BLOOD PRESSURE: 72 MMHG | HEIGHT: 63 IN | RESPIRATION RATE: 14 BRPM | BODY MASS INDEX: 51.56 KG/M2 | TEMPERATURE: 98.3 F | WEIGHT: 291 LBS | SYSTOLIC BLOOD PRESSURE: 114 MMHG | OXYGEN SATURATION: 97 %

## 2019-05-16 DIAGNOSIS — M54.32 SCIATICA OF LEFT SIDE: Primary | ICD-10-CM

## 2019-05-16 DIAGNOSIS — D50.8 IRON DEFICIENCY ANEMIA SECONDARY TO INADEQUATE DIETARY IRON INTAKE: ICD-10-CM

## 2019-05-16 LAB
ERYTHROCYTE [DISTWIDTH] IN BLOOD BY AUTOMATED COUNT: 21 % (ref 10–15)
FERRITIN SERPL-MCNC: 24 NG/ML (ref 8–252)
HCT VFR BLD AUTO: 38.5 % (ref 35–47)
HGB BLD-MCNC: 12.2 G/DL (ref 11.7–15.7)
MCH RBC QN AUTO: 26.5 PG (ref 26.5–33)
MCHC RBC AUTO-ENTMCNC: 31.7 G/DL (ref 31.5–36.5)
MCV RBC AUTO: 84 FL (ref 78–100)
PLATELET # BLD AUTO: 240 10E9/L (ref 150–450)
RBC # BLD AUTO: 4.61 10E12/L (ref 3.8–5.2)
WBC # BLD AUTO: 6.1 10E9/L (ref 4–11)

## 2019-05-16 PROCEDURE — 36415 COLL VENOUS BLD VENIPUNCTURE: CPT | Mod: ZL | Performed by: FAMILY MEDICINE

## 2019-05-16 PROCEDURE — 82728 ASSAY OF FERRITIN: CPT | Mod: ZL | Performed by: FAMILY MEDICINE

## 2019-05-16 PROCEDURE — 99214 OFFICE O/P EST MOD 30 MIN: CPT | Performed by: FAMILY MEDICINE

## 2019-05-16 PROCEDURE — 85027 COMPLETE CBC AUTOMATED: CPT | Mod: ZL | Performed by: FAMILY MEDICINE

## 2019-05-16 PROCEDURE — G0463 HOSPITAL OUTPT CLINIC VISIT: HCPCS

## 2019-05-16 RX ORDER — VITAMIN E 268 MG
1000 CAPSULE ORAL
COMMUNITY
End: 2019-12-19

## 2019-05-16 RX ORDER — PREDNISONE 20 MG/1
20 TABLET ORAL DAILY
Qty: 5 TABLET | Refills: 0 | Status: SHIPPED | OUTPATIENT
Start: 2019-05-16 | End: 2019-09-05

## 2019-05-16 RX ORDER — UBIDECARENONE 100 MG
CAPSULE ORAL DAILY
COMMUNITY

## 2019-05-16 ASSESSMENT — PAIN SCALES - GENERAL: PAINLEVEL: MILD PAIN (3)

## 2019-05-16 ASSESSMENT — MIFFLIN-ST. JEOR: SCORE: 1864.1

## 2019-05-16 NOTE — PROGRESS NOTES
SUBJECTIVE:   Maite Suero is a 59 year old female who presents to clinic today for the following   health issues:      Musculoskeletal problem/pain      Duration: 3/19/19    Description  Location: Left leg    Intensity:  3/10    Accompanying signs and symptoms: none    History  Previous similar problem: No  Previous evaluation:  x-ray    Precipitating or alleviating factors:  Trauma or overuse: YES- fall  Aggravating factors include: standing and rising    Therapies tried and outcome: nothing    Patient states pain has improved from previous evaluation, but has not resolved.      Patient would like to have iron and hemoglobin rechecked today.        Additional history: as documented    Reviewed  and updated as needed this visit by clinical staff  Tobacco  Allergies  Meds         Reviewed and updated as needed this visit by Provider         Patient Active Problem List   Diagnosis     Benign essential hypertension     Hyperlipidemia     Major depressive disorder, recurrent episode (H)     Ankle sprain     Weight gain     GERD (gastroesophageal reflux disease)     Low back pain     Cervicalgia (NECK)     Urinary, incontinence, stress female     Irritable bowel syndrome     Morbid obesity (H)     Dissociative identity disorder (H)     Vitamin D deficiency     OAB (overactive bladder)     ACP (advance care planning)     Mixed stress and urge urinary incontinence     Iron deficiency anemia secondary to inadequate dietary iron intake     Past Surgical History:   Procedure Laterality Date     CHOLECYSTECTOMY       COLONOSCOPY N/A 10/9/2014    Procedure: COLONOSCOPY;  Surgeon: Vandana Daniels MD;  Location: [facility]     HYSTERECTOMY TOTAL ABDOMINAL, BILATERAL SALPINGO-OOPHORECTOMY, COMBINED      no cervix; fibroids       Social History     Tobacco Use     Smoking status: Never Smoker     Smokeless tobacco: Never Used   Substance Use Topics     Alcohol use: No     Family History   Problem Relation Age of Onset      Psychotic Disorder Mother      Psychotic Disorder Father      Psychotic Disorder Brother      Psychotic Disorder Sister          Current Outpatient Medications   Medication Sig Dispense Refill     buPROPion (WELLBUTRIN XL) 300 MG 24 hr tablet Take 300 mg by mouth daily        Cholecalciferol (VITAMIN D) 2000 UNITS CAPS Take 1 capsule by mouth daily       co-enzyme Q-10 100 MG CAPS capsule Take by mouth daily       Cranberry 1000 MG CAPS        Docusate Sodium (COLACE PO) Take 100 mg by mouth daily 1 tablet daily       ferrous sulfate (FE TABS) 325 (65 Fe) MG EC tablet Take 1 tablet (325 mg) by mouth 2 times daily (Patient taking differently: Take 325 mg by mouth daily ) 60 tablet 2     Flaxseed, Linseed, (FLAXSEED OIL) 1000 MG CAPS Take 1,000 mg by mouth       methylphenidate (RITALIN LA) 20 MG CP24 Take 80 mg by mouth daily       mirabegron (MYRBETRIQ) 50 MG 24 hr tablet Take 1 tablet (50 mg) by mouth daily 90 tablet 3     omeprazole (PRILOSEC) 20 MG CR capsule TAKE 1 CAPSULE BY MOUTH TWICE DAILY (Patient taking differently: Taking 2 tablets one time daily) 60 capsule 11     predniSONE (DELTASONE) 20 MG tablet Take 20 mg by mouth daily for 5 days. 5 tablet 0     rosuvastatin (CRESTOR) 20 MG tablet TAKE 1 TABLET BY MOUTH DAILY 90 tablet 2     ROZEREM 8 MG tablet TAKE 1 TABLET BY MOUTH EVERY NIGHT AT BEDTIME 30 tablet 2     sertraline (ZOLOFT) 100 MG tablet Take 2 tablets by mouth daily       vitamin B complex with vitamin C (VITAMIN  B COMPLEX) tablet Take 1 tablet by mouth daily       Allergies   Allergen Reactions     Shellfish Allergy Anaphylaxis     Ambien [Zolpidem] Other (See Comments)     Pt states she was baking food in her sleep     Pork Allergy      Seasonal      Povidone Iodine Rash       ROS:  Constitutional, HEENT, cardiovascular, pulmonary, gi and gu systems are negative, except as otherwise noted.    OBJECTIVE:     /72 (BP Location: Left arm, Patient Position: Sitting, Cuff Size: Adult Large)    "Pulse 78   Temp 98.3  F (36.8  C) (Tympanic)   Resp 14   Ht 1.6 m (5' 3\")   Wt 132 kg (291 lb)   SpO2 97%   BMI 51.55 kg/m    Body mass index is 51.55 kg/m .  GENERAL: healthy, alert and no distress  Comprehensive back pain exam:  Pain in tailbone area and down sciatic area of left side  PSYCH: mentation appears normal, affect normal/bright    Diagnostic Test Results:  none     ASSESSMENT/PLAN:     1. Sciatica of left side  Will use prednisone to calm symptoms.  If no improvement, consider PT referral.  - predniSONE (DELTASONE) 20 MG tablet; Take 20 mg by mouth daily for 5 days.  Dispense: 5 tablet; Refill: 0    2. Iron deficiency anemia secondary to inadequate dietary iron intake  Will recheck labs.  - CBC with platelets  - Ferritin        Yessi Dorsey MD  [facility] [place] [facility]      "

## 2019-05-28 DIAGNOSIS — K21.9 GASTROESOPHAGEAL REFLUX DISEASE WITHOUT ESOPHAGITIS: ICD-10-CM

## 2019-06-24 DIAGNOSIS — N32.81 OAB (OVERACTIVE BLADDER): ICD-10-CM

## 2019-06-24 DIAGNOSIS — N39.46 MIXED INCONTINENCE URGE AND STRESS (MALE)(FEMALE): ICD-10-CM

## 2019-06-24 DIAGNOSIS — G47.00 INSOMNIA, UNSPECIFIED TYPE: ICD-10-CM

## 2019-06-24 RX ORDER — MIRABEGRON 50 MG/1
TABLET, FILM COATED, EXTENDED RELEASE ORAL
Qty: 90 TABLET | Refills: 0 | Status: SHIPPED | OUTPATIENT
Start: 2019-06-24 | End: 2019-09-22

## 2019-06-24 RX ORDER — RAMELTEON 8 MG/1
TABLET, FILM COATED ORAL
Qty: 30 TABLET | Refills: 3 | Status: SHIPPED | OUTPATIENT
Start: 2019-06-24 | End: 2019-09-27

## 2019-06-24 NOTE — TELEPHONE ENCOUNTER
Rozerem      Last Written Prescription Date:  3/29/2019  Last Fill Quantity: 30,   # refills: 2  Last Office Visit: 5/16/2019  Future Office visit:    Next 5 appointments (look out 90 days)    Sep 05, 2019  9:45 AM CDT  (Arrive by 9:30 AM)  SHORT with Yessi Dorsey MD  [facility] ) [address]  [facility] [address]  [phone]           Routing refill request to provider for review/approval because:  Drug not on the [facility], [facility] or [facility] refill protocol or controlled substance

## 2019-08-23 DIAGNOSIS — K21.9 GASTROESOPHAGEAL REFLUX DISEASE WITHOUT ESOPHAGITIS: ICD-10-CM

## 2019-08-27 NOTE — PROGRESS NOTES
Subjective     Maite Suero is a 59 year old female who presents to clinic today for the following health issues:    HPI   Hyperlipidemia Follow-Up      Are you having any of the following symptoms? (Select all that apply)  Shortness of breath and Chest pain or pressure    Are you regularly taking any medication or supplement to lower your cholesterol?   Yes- crestor    Are you having muscle aches or other side effects that you think could be caused by your cholesterol lowering medication?  No      Hypertension Follow-up      Do you check your blood pressure regularly outside of the clinic? Yes     Are you following a low salt diet? Yes    Are your blood pressures ever more than 140 on the top number (systolic) OR more   than 90 on the bottom number (diastolic), for example 140/90? No       Depression and Anxiety Follow-Up    How are you doing with your depression since your last visit? No change    How are you doing with your anxiety since your last visit?  No change    Are you having other symptoms that might be associated with depression or anxiety? No    Have you had a significant life event? Possible new job     Do you have any concerns with your use of alcohol or other drugs? No     Patient states she is having difficulty finding a psychiatrist for medications.  She states she has been on the same medications for over 3 years, and she doesn't see that changing any time soon.  She wonders if I am comfortable taking over prescribing.    Social History     Tobacco Use     Smoking status: Never Smoker     Smokeless tobacco: Never Used   Substance Use Topics     Alcohol use: No     Drug use: No     PHQ 3/9/2018 8/28/2018 9/5/2019   PHQ-9 Total Score 9 3 12   Q9: Thoughts of better off dead/self-harm past 2 weeks Not at all Not at all Not at all     IVONNE-7 SCORE 3/9/2018 8/28/2018 9/5/2019   Total Score 1 1 0       Suicide Assessment Five-step Evaluation and Treatment (SAFE-T)      How many servings of fruits and  vegetables do you eat daily?  4 or more    On average, how many sweetened beverages do you drink each day (soda, juice, sweet tea, etc)?   1    How many days per week do you miss taking your medication? 0        Musculoskeletal problem/pain      Duration: 1 month    Description  Location: bilateral hand pain    Intensity:  moderate    Accompanying signs and symptoms: numbness and tingling    History  Previous similar problem: no   Previous evaluation:  none    Precipitating or alleviating factors:  Trauma or overuse: no   Aggravating factors include: if not moving    Therapies tried and outcome: Naproxen      Patient Active Problem List   Diagnosis     Benign essential hypertension     Hyperlipidemia     Major depressive disorder, recurrent episode (H)     Ankle sprain     Weight gain     GERD (gastroesophageal reflux disease)     Low back pain     Cervicalgia (NECK)     Urinary, incontinence, stress female     Irritable bowel syndrome     Morbid obesity (H)     Dissociative identity disorder (H)     Vitamin D deficiency     OAB (overactive bladder)     ACP (advance care planning)     Mixed stress and urge urinary incontinence     Iron deficiency anemia secondary to inadequate dietary iron intake     Past Surgical History:   Procedure Laterality Date     CHOLECYSTECTOMY       COLONOSCOPY N/A 10/9/2014    Procedure: COLONOSCOPY;  Surgeon: Vandana Daniels MD;  Location: [facility]     HYSTERECTOMY TOTAL ABDOMINAL, BILATERAL SALPINGO-OOPHORECTOMY, COMBINED      no cervix; fibroids       Social History     Tobacco Use     Smoking status: Never Smoker     Smokeless tobacco: Never Used   Substance Use Topics     Alcohol use: No     Family History   Problem Relation Age of Onset     Psychotic Disorder Mother      Psychotic Disorder Father      Psychotic Disorder Brother      Psychotic Disorder Sister          Current Outpatient Medications   Medication Sig Dispense Refill     buPROPion (WELLBUTRIN XL) 300 MG 24 hr tablet Take  "300 mg by mouth daily        Cholecalciferol (VITAMIN D) 2000 UNITS CAPS Take 1 capsule by mouth daily       co-enzyme Q-10 100 MG CAPS capsule Take by mouth daily       Cranberry 1000 MG CAPS        Docusate Sodium (COLACE PO) Take 100 mg by mouth daily 1 tablet daily       ferrous sulfate (FE TABS) 325 (65 Fe) MG EC tablet Take 1 tablet (325 mg) by mouth 2 times daily (Patient taking differently: Take 325 mg by mouth daily ) 60 tablet 2     Flaxseed, Linseed, (FLAXSEED OIL) 1000 MG CAPS Take 1,000 mg by mouth       methylphenidate (RITALIN LA) 20 MG CP24 Take 80 mg by mouth daily       MYRBETRIQ 50 MG 24 hr tablet TAKE 1 TABLET(50 MG) BY MOUTH DAILY 90 tablet 0     omeprazole (PRILOSEC) 20 MG DR capsule TAKE 1 CAPSULE BY MOUTH TWICE DAILY (Patient taking differently: patient reports taking 2 pills one time a day) 180 capsule 2     rosuvastatin (CRESTOR) 20 MG tablet TAKE 1 TABLET BY MOUTH DAILY 90 tablet 2     ROZEREM 8 MG tablet TAKE 1 TABLET BY MOUTH EVERY NIGHT AT BEDTIME 30 tablet 3     sertraline (ZOLOFT) 100 MG tablet Take 2 tablets by mouth daily       vitamin B complex with vitamin C (VITAMIN  B COMPLEX) tablet Take 1 tablet by mouth daily       Allergies   Allergen Reactions     Shellfish Allergy Anaphylaxis     Ambien [Zolpidem] Other (See Comments)     Pt states she was baking food in her sleep     Pork Allergy      Seasonal      Povidone Iodine Rash       Reviewed and updated as needed this visit by Provider         Review of Systems   ROS COMP: Constitutional, HEENT, cardiovascular, pulmonary, gi and gu systems are negative, except as otherwise noted.      Objective    /72 (BP Location: Left arm, Patient Position: Sitting, Cuff Size: Adult Large)   Pulse 88   Temp 97.4  F (36.3  C) (Tympanic)   Resp 14   Ht 1.6 m (5' 3\")   Wt 129.7 kg (286 lb)   SpO2 97%   BMI 50.66 kg/m    Body mass index is 50.66 kg/m .  Physical Exam   GENERAL: healthy, alert and no distress  MS: mild swelling of " "joints in hands, suggestive of OA  PSYCH: mentation appears normal, affect normal/bright    Diagnostic Test Results:  Ordered        Assessment & Plan     1. Hyperlipidemia, unspecified hyperlipidemia type  Labs updated, follow-up in 6 months, sooner as needed  - Comprehensive metabolic panel  - Lipid Profile    2. Benign essential hypertension  As above.  - Comprehensive metabolic panel    3. Moderate episode of recurrent major depressive disorder (H)  Her medications are stable, I am fine prescribing when due    4. Primary osteoarthritis involving multiple joints  We talked about NSAIDs and glucosamine.  Follow-up as needed.       BMI:   Estimated body mass index is 50.66 kg/m  as calculated from the following:    Height as of this encounter: 1.6 m (5' 3\").    Weight as of this encounter: 129.7 kg (286 lb).         Return in about 6 months (around 3/5/2020) for Chronic Disease Management.    Yessi Dorsey MD  [facility]      "

## 2019-09-05 ENCOUNTER — OFFICE VISIT (OUTPATIENT)
Dept: FAMILY MEDICINE | Facility: OTHER | Age: 59
End: 2019-09-05
Attending: FAMILY MEDICINE
Payer: COMMERCIAL

## 2019-09-05 VITALS
SYSTOLIC BLOOD PRESSURE: 118 MMHG | RESPIRATION RATE: 14 BRPM | HEIGHT: 63 IN | HEART RATE: 88 BPM | OXYGEN SATURATION: 97 % | BODY MASS INDEX: 50.68 KG/M2 | WEIGHT: 286 LBS | TEMPERATURE: 97.4 F | DIASTOLIC BLOOD PRESSURE: 72 MMHG

## 2019-09-05 DIAGNOSIS — M15.0 PRIMARY OSTEOARTHRITIS INVOLVING MULTIPLE JOINTS: ICD-10-CM

## 2019-09-05 DIAGNOSIS — F33.1 MODERATE EPISODE OF RECURRENT MAJOR DEPRESSIVE DISORDER (H): ICD-10-CM

## 2019-09-05 DIAGNOSIS — E78.5 HYPERLIPIDEMIA, UNSPECIFIED HYPERLIPIDEMIA TYPE: Primary | ICD-10-CM

## 2019-09-05 DIAGNOSIS — I10 BENIGN ESSENTIAL HYPERTENSION: ICD-10-CM

## 2019-09-05 LAB
ALBUMIN SERPL-MCNC: 3.6 G/DL (ref 3.4–5)
ALP SERPL-CCNC: 133 U/L (ref 40–150)
ALT SERPL W P-5'-P-CCNC: 32 U/L (ref 0–50)
ANION GAP SERPL CALCULATED.3IONS-SCNC: 7 MMOL/L (ref 3–14)
AST SERPL W P-5'-P-CCNC: 21 U/L (ref 0–45)
BILIRUB SERPL-MCNC: 0.3 MG/DL (ref 0.2–1.3)
BUN SERPL-MCNC: 21 MG/DL (ref 7–30)
CALCIUM SERPL-MCNC: 8.5 MG/DL (ref 8.5–10.1)
CHLORIDE SERPL-SCNC: 108 MMOL/L (ref 94–109)
CHOLEST SERPL-MCNC: 159 MG/DL
CO2 SERPL-SCNC: 24 MMOL/L (ref 20–32)
CREAT SERPL-MCNC: 0.8 MG/DL (ref 0.52–1.04)
GFR SERPL CREATININE-BSD FRML MDRD: 81 ML/MIN/{1.73_M2}
GLUCOSE SERPL-MCNC: 97 MG/DL (ref 70–99)
HDLC SERPL-MCNC: 56 MG/DL
LDLC SERPL CALC-MCNC: 70 MG/DL
NONHDLC SERPL-MCNC: 103 MG/DL
POTASSIUM SERPL-SCNC: 4.1 MMOL/L (ref 3.4–5.3)
PROT SERPL-MCNC: 7.6 G/DL (ref 6.8–8.8)
SODIUM SERPL-SCNC: 139 MMOL/L (ref 133–144)
TRIGL SERPL-MCNC: 166 MG/DL

## 2019-09-05 PROCEDURE — 36415 COLL VENOUS BLD VENIPUNCTURE: CPT | Mod: ZL | Performed by: FAMILY MEDICINE

## 2019-09-05 PROCEDURE — 80053 COMPREHEN METABOLIC PANEL: CPT | Mod: ZL | Performed by: FAMILY MEDICINE

## 2019-09-05 PROCEDURE — G0463 HOSPITAL OUTPT CLINIC VISIT: HCPCS

## 2019-09-05 PROCEDURE — 99214 OFFICE O/P EST MOD 30 MIN: CPT | Performed by: FAMILY MEDICINE

## 2019-09-05 PROCEDURE — 80061 LIPID PANEL: CPT | Mod: ZL | Performed by: FAMILY MEDICINE

## 2019-09-05 ASSESSMENT — PATIENT HEALTH QUESTIONNAIRE - PHQ9
5. POOR APPETITE OR OVEREATING: NOT AT ALL
SUM OF ALL RESPONSES TO PHQ QUESTIONS 1-9: 12

## 2019-09-05 ASSESSMENT — ANXIETY QUESTIONNAIRES
7. FEELING AFRAID AS IF SOMETHING AWFUL MIGHT HAPPEN: NOT AT ALL
2. NOT BEING ABLE TO STOP OR CONTROL WORRYING: NOT AT ALL
GAD7 TOTAL SCORE: 0
3. WORRYING TOO MUCH ABOUT DIFFERENT THINGS: NOT AT ALL
1. FEELING NERVOUS, ANXIOUS, OR ON EDGE: NOT AT ALL
6. BECOMING EASILY ANNOYED OR IRRITABLE: NOT AT ALL
5. BEING SO RESTLESS THAT IT IS HARD TO SIT STILL: NOT AT ALL

## 2019-09-05 ASSESSMENT — MIFFLIN-ST. JEOR: SCORE: 1841.42

## 2019-09-05 ASSESSMENT — PAIN SCALES - GENERAL: PAINLEVEL: SEVERE PAIN (6)

## 2019-09-05 NOTE — NURSING NOTE
"Chief Complaint   Patient presents with     Hypertension     Hyperlipidemia     Anxiety     Depression     Musculoskeletal Problem       Initial /72 (BP Location: Left arm, Patient Position: Sitting, Cuff Size: Adult Large)   Pulse 88   Temp 97.4  F (36.3  C) (Tympanic)   Resp 14   Ht 1.6 m (5' 3\")   Wt 129.7 kg (286 lb)   SpO2 97%   BMI 50.66 kg/m   Estimated body mass index is 50.66 kg/m  as calculated from the following:    Height as of this encounter: 1.6 m (5' 3\").    Weight as of this encounter: 129.7 kg (286 lb).  Medication Reconciliation: complete   Lakshmi Gusman LPN      "

## 2019-09-06 ASSESSMENT — ANXIETY QUESTIONNAIRES: GAD7 TOTAL SCORE: 0

## 2019-09-17 ENCOUNTER — MEDICAL CORRESPONDENCE (OUTPATIENT)
Dept: HEALTH INFORMATION MANAGEMENT | Facility: CLINIC | Age: 59
End: 2019-09-17

## 2019-09-18 DIAGNOSIS — H91.93 DECREASED HEARING OF BOTH EARS: Primary | ICD-10-CM

## 2019-09-22 DIAGNOSIS — N39.46 MIXED INCONTINENCE URGE AND STRESS (MALE)(FEMALE): ICD-10-CM

## 2019-09-22 DIAGNOSIS — N32.81 OAB (OVERACTIVE BLADDER): ICD-10-CM

## 2019-09-24 RX ORDER — MIRABEGRON 50 MG/1
TABLET, FILM COATED, EXTENDED RELEASE ORAL
Qty: 90 TABLET | Refills: 0 | Status: SHIPPED | OUTPATIENT
Start: 2019-09-24 | End: 2020-05-04

## 2019-09-26 NOTE — TELEPHONE ENCOUNTER
Lasix      Last Written Prescription Date: discontinued 10/17/17   PCP [facility] to advise.   Last Office Visit with G, P or [facility] prescribing provider: 9/8/17                                                                  Potassium   Date Value Ref Range Status   01/05/2017 3.8 3.4 - 5.3 mmol/L Final     Creatinine   Date Value Ref Range Status   01/05/2017 0.78 0.52 - 1.04 mg/dL Final     BP Readings from Last 3 Encounters:   10/17/17 112/60   10/17/17 100/64   09/12/17 118/74       
Lasix      Last Written Prescription Date: n/a  Last Fill Quantity: n/a,  # refills: n/a   Last Office Visit with G, P or [facility] prescribing provider: 9/8/17                                               
Please review, I have not seen her and do not know which dose she is to be taking.    
4 mm

## 2019-09-27 ENCOUNTER — OFFICE VISIT (OUTPATIENT)
Dept: FAMILY MEDICINE | Facility: OTHER | Age: 59
End: 2019-09-27
Attending: FAMILY MEDICINE
Payer: COMMERCIAL

## 2019-09-27 ENCOUNTER — NURSE TRIAGE (OUTPATIENT)
Dept: FAMILY MEDICINE | Facility: OTHER | Age: 59
End: 2019-09-27

## 2019-09-27 VITALS
BODY MASS INDEX: 51.26 KG/M2 | HEART RATE: 73 BPM | WEIGHT: 289.3 LBS | DIASTOLIC BLOOD PRESSURE: 70 MMHG | HEIGHT: 63 IN | RESPIRATION RATE: 14 BRPM | TEMPERATURE: 97 F | OXYGEN SATURATION: 98 % | SYSTOLIC BLOOD PRESSURE: 110 MMHG

## 2019-09-27 DIAGNOSIS — H00.021 HORDEOLUM INTERNUM OF RIGHT UPPER EYELID: Primary | ICD-10-CM

## 2019-09-27 DIAGNOSIS — K21.9 GASTROESOPHAGEAL REFLUX DISEASE WITHOUT ESOPHAGITIS: ICD-10-CM

## 2019-09-27 DIAGNOSIS — G47.00 INSOMNIA, UNSPECIFIED TYPE: ICD-10-CM

## 2019-09-27 PROCEDURE — G0463 HOSPITAL OUTPT CLINIC VISIT: HCPCS

## 2019-09-27 PROCEDURE — 99214 OFFICE O/P EST MOD 30 MIN: CPT | Performed by: FAMILY MEDICINE

## 2019-09-27 RX ORDER — TRAZODONE HYDROCHLORIDE 50 MG/1
50 TABLET, FILM COATED ORAL AT BEDTIME
Qty: 30 TABLET | Refills: 2 | Status: SHIPPED | OUTPATIENT
Start: 2019-09-27 | End: 2019-11-20

## 2019-09-27 RX ORDER — OMEPRAZOLE 40 MG/1
40 CAPSULE, DELAYED RELEASE ORAL DAILY
Qty: 90 CAPSULE | Refills: 1 | Status: SHIPPED | OUTPATIENT
Start: 2019-09-27

## 2019-09-27 RX ORDER — ERYTHROMYCIN 5 MG/G
0.5 OINTMENT OPHTHALMIC 2 TIMES DAILY
Qty: 3.5 G | Refills: 0 | Status: SHIPPED | OUTPATIENT
Start: 2019-09-27 | End: 2019-10-02

## 2019-09-27 ASSESSMENT — PAIN SCALES - GENERAL: PAINLEVEL: MILD PAIN (2)

## 2019-09-27 ASSESSMENT — MIFFLIN-ST. JEOR: SCORE: 1856.39

## 2019-09-27 NOTE — NURSING NOTE
"Chief Complaint   Patient presents with     Eye Problem       Initial /70 (BP Location: Right arm, Patient Position: Sitting, Cuff Size: Adult Regular)   Pulse 73   Temp 97  F (36.1  C) (Tympanic)   Resp 14   Ht 1.6 m (5' 3\")   Wt 131.2 kg (289 lb 4.8 oz)   SpO2 98%   BMI 51.25 kg/m   Estimated body mass index is 51.25 kg/m  as calculated from the following:    Height as of this encounter: 1.6 m (5' 3\").    Weight as of this encounter: 131.2 kg (289 lb 4.8 oz).  Medication Reconciliation: complete  Fanta Thorpe MA  "

## 2019-09-27 NOTE — TELEPHONE ENCOUNTER
"Patient called and stated that she has a swollen right upper eyelid with pain. Patient did state that she noticed this yesterday. Patient woke up this morning and it was worse. Patient was scheduled an office visit per protocol.     Reason for Disposition    [1] Eyelid is very swollen AND [2] no fever    Additional Information    Negative: Patient sounds very sick or weak to the triager    Negative: [1] Eyelid is red AND [2] fever    Negative: [1] Eyelid is swollen AND [2] fever    Negative: Redness spreads around the eye (both upper and lower eyelid are red)    Negative: [1] Blurred vision AND [2] new or worsening    Negative: 2 or more styes are present    Answer Assessment - Initial Assessment Questions  1. LOCATION: \"Which eye has the sty?\" \"Upper or lower eyelid?\"      Right upper eyelid  2. SIZE: \"How big is it?\" (Note: standard pencil eraser is 6 mm)      About the size of a pencil eraser  3. EYELID: \"Is the eyelid swollen?\" If so, ask: \"How much?\"      Whole eyelid is swollen  4. REDNESS: \"Has the redness spread onto the eyelid?\"      Redness on eyelid  5. ONSET: \"When did you notice the sty?\"      yesterday  6. VISION: \"Do you have blurred vision?\"       no  7. PAIN: \"Is it painful?\" If so, ask: \"How bad is the pain?\"  (Scale 1-10; or mild, moderate, severe)      Moderate pain  8. CONTACTS: \"Do you wear contacts?\"      no  9. OTHER SYMPTOMS: \"Do you have any other symptoms?\" (e.g., fever)      no  10. PREGNANCY: \"Is there any chance you are pregnant?\" \"When was your last menstrual period?\"        no    Protocols used: STY-A-MARANDA      "

## 2019-09-27 NOTE — TELEPHONE ENCOUNTER
Gentamicin isn't available per pharmacist, routed alternative request per pharmacy request. Pt is at pharmacy now.

## 2019-09-27 NOTE — PROGRESS NOTES
Subjective     Maite Suero is a 59 year old female who presents to clinic today for the following health issues:    HPI   Eye(s) Problem      Duration: 2 days    Description:  Location: right  Pain: YES  Redness: YES  Discharge: YES- weaping    Accompanying signs and symptoms: none    History (Trauma, foreign body exposure,): None    Precipitating or alleviating factors (contact use): None    Therapies tried and outcome: None      Insomnia      Duration: years    Description  Frequency of insomnia:  several times a week    Accompanying signs and symptoms:  none    History  Similar episodes in past:  YES  Previous evaluation/sleep study:  YES    Precipitating or alleviating factors:  New stressful situation: no   Caffeine intake after lunchtime: no   OTC decongestants: no   Any new medications: no     Therapies tried and outcome: Currently has been taking Rozarem, not effective recently, even with taking 2      GERD/Heartburn      Duration: years    Description (location/character/radiation): known reflux    Intensity:  mild, controlled with medication    Accompanying signs and symptoms:  food getting stuck: no   nausea/vomiting/blood: no   abdominal pain: no   black/tarry or bloody stools: no :    History (similar episodes/previous evaluation): None    Precipitating or alleviating factors:  worse with fatty foods and spicy foods.  current NSAID/Aspirin use: no     Therapies tried and outcome: Omeprazole (Prilosec) - helps, would like to change to once daily dosing instead of twice daily           Patient Active Problem List   Diagnosis     Benign essential hypertension     Hyperlipidemia     Major depressive disorder, recurrent episode (H)     Ankle sprain     Weight gain     GERD (gastroesophageal reflux disease)     Low back pain     Cervicalgia (NECK)     Urinary, incontinence, stress female     Irritable bowel syndrome     Morbid obesity (H)     Dissociative identity disorder (H)     Vitamin D deficiency      OAB (overactive bladder)     ACP (advance care planning)     Mixed stress and urge urinary incontinence     Iron deficiency anemia secondary to inadequate dietary iron intake     Past Surgical History:   Procedure Laterality Date     CHOLECYSTECTOMY       COLONOSCOPY N/A 10/9/2014    Procedure: COLONOSCOPY;  Surgeon: Vandana Daniels MD;  Location: [facility]     HYSTERECTOMY TOTAL ABDOMINAL, BILATERAL SALPINGO-OOPHORECTOMY, COMBINED      no cervix; fibroids       Social History     Tobacco Use     Smoking status: Never Smoker     Smokeless tobacco: Never Used   Substance Use Topics     Alcohol use: No     Family History   Problem Relation Age of Onset     Psychotic Disorder Mother      Psychotic Disorder Father      Psychotic Disorder Brother      Psychotic Disorder Sister          Current Outpatient Medications   Medication Sig Dispense Refill     buPROPion (WELLBUTRIN XL) 300 MG 24 hr tablet Take 300 mg by mouth daily        Cholecalciferol (VITAMIN D) 2000 UNITS CAPS Take 1 capsule by mouth daily       co-enzyme Q-10 100 MG CAPS capsule Take by mouth daily       Cranberry 1000 MG CAPS        Docusate Sodium (COLACE PO) Take 100 mg by mouth daily 1 tablet daily       ferrous sulfate (FE TABS) 325 (65 Fe) MG EC tablet Take 1 tablet (325 mg) by mouth 2 times daily (Patient taking differently: Take 325 mg by mouth daily ) 60 tablet 2     Flaxseed, Linseed, (FLAXSEED OIL) 1000 MG CAPS Take 1,000 mg by mouth       gentamicin (GARAMYCIN) 0.3 % ophthalmic ointment Place 0.5 inches into the right eye 2 times daily for 5 days 3.5 g 0     methylphenidate (RITALIN LA) 20 MG CP24 Take 80 mg by mouth daily       MYRBETRIQ 50 MG 24 hr tablet TAKE 1 TABLET(50 MG) BY MOUTH DAILY 90 tablet 0     omeprazole (PRILOSEC) 40 MG DR capsule Take 1 capsule (40 mg) by mouth daily 90 capsule 1     rosuvastatin (CRESTOR) 20 MG tablet TAKE 1 TABLET BY MOUTH DAILY 90 tablet 2     sertraline (ZOLOFT) 100 MG tablet Take 2 tablets by mouth daily  "      traZODone (DESYREL) 50 MG tablet Take 1 tablet (50 mg) by mouth At Bedtime 30 tablet 2     vitamin B complex with vitamin C (VITAMIN  B COMPLEX) tablet Take 1 tablet by mouth daily       Allergies   Allergen Reactions     Shellfish Allergy Anaphylaxis     Ambien [Zolpidem] Other (See Comments)     Pt states she was baking food in her sleep     Pork Allergy      Seasonal      Povidone Iodine Rash         Reviewed and updated as needed this visit by Provider         Review of Systems   ROS COMP: Constitutional, HEENT, cardiovascular, pulmonary, gi and gu systems are negative, except as otherwise noted.      Objective    /70 (BP Location: Right arm, Patient Position: Sitting, Cuff Size: Adult Regular)   Pulse 73   Temp 97  F (36.1  C) (Tympanic)   Resp 14   Ht 1.6 m (5' 3\")   Wt 131.2 kg (289 lb 4.8 oz)   SpO2 98%   BMI 51.25 kg/m    Body mass index is 51.25 kg/m .  Physical Exam   GENERAL: healthy, alert and no distress  EYES: eyelids- hordeolum/stye right eye, internal, visualized with lifting upper lid  PSYCH: mentation appears normal, affect normal/bright    Diagnostic Test Results:  none         Assessment & Plan     1. Hordeolum internum of right upper eyelid  Gentamicin prescribed, warm compresses recommended  - gentamicin (GARAMYCIN) 0.3 % ophthalmic ointment; Place 0.5 inches into the right eye 2 times daily for 5 days  Dispense: 3.5 g; Refill: 0    2. Insomnia, unspecified type  Will change Rozerem to Trazodone.  Follow-up if no improvement noted.  - traZODone (DESYREL) 50 MG tablet; Take 1 tablet (50 mg) by mouth At Bedtime  Dispense: 30 tablet; Refill: 2    3. Gastroesophageal reflux disease without esophagitis  Will change from 20 mg BID to 40 mg every day.  Follow-up as needed.  - omeprazole (PRILOSEC) 40 MG DR capsule; Take 1 capsule (40 mg) by mouth daily  Dispense: 90 capsule; Refill: 1     BMI:   Estimated body mass index is 51.25 kg/m  as calculated from the following:    Height as " "of this encounter: 1.6 m (5' 3\").    Weight as of this encounter: 131.2 kg (289 lb 4.8 oz).         Return in about 6 months (around 3/27/2020) for Chronic Disease Management.    Yessi Dorsey MD  [facility]      "

## 2019-10-17 ENCOUNTER — OFFICE VISIT (OUTPATIENT)
Dept: AUDIOLOGY | Facility: OTHER | Age: 59
End: 2019-10-17
Attending: AUDIOLOGIST
Payer: COMMERCIAL

## 2019-10-17 DIAGNOSIS — H90.3 SENSORINEURAL HEARING LOSS (SNHL) OF BOTH EARS: Primary | ICD-10-CM

## 2019-10-17 PROCEDURE — 92557 COMPREHENSIVE HEARING TEST: CPT | Performed by: AUDIOLOGIST

## 2019-10-17 PROCEDURE — 92550 TYMPANOMETRY & REFLEX THRESH: CPT | Performed by: AUDIOLOGIST

## 2019-10-17 NOTE — PROGRESS NOTES
Audiology Evaluation Completed. Please refer SCANNED AUDIOGRAM and/or TYMPANOGRAM for BACKGROUND, RESULTS, RECOMMENDATIONS.      Kelin ELIAS, [facility]  Audiologist #[id number]

## 2019-11-19 ENCOUNTER — TELEPHONE (OUTPATIENT)
Dept: FAMILY MEDICINE | Facility: OTHER | Age: 59
End: 2019-11-19

## 2019-11-19 DIAGNOSIS — G47.00 INSOMNIA, UNSPECIFIED TYPE: ICD-10-CM

## 2019-11-19 NOTE — TELEPHONE ENCOUNTER
Pt calling, was on Rozerom 8mg for sleep  Needed to take 2 to get to sleep  You didn't want her to take 2  Changed medication to Trazadone  Gave it a month and has no results, can't sleep  Asking if she should come in   Can you rx an alternate medication  Pt is moving in the near future and needs to have this taken care of    Please call pt.    Loren Pederson LPN

## 2019-11-20 RX ORDER — TRAZODONE HYDROCHLORIDE 100 MG/1
100 TABLET ORAL AT BEDTIME
Qty: 30 TABLET | Refills: 1 | Status: SHIPPED | OUTPATIENT
Start: 2019-11-20 | End: 2019-12-19

## 2019-12-19 ENCOUNTER — OFFICE VISIT (OUTPATIENT)
Dept: FAMILY MEDICINE | Facility: OTHER | Age: 59
End: 2019-12-19
Attending: FAMILY MEDICINE
Payer: COMMERCIAL

## 2019-12-19 VITALS
HEIGHT: 61 IN | OXYGEN SATURATION: 96 % | HEART RATE: 94 BPM | SYSTOLIC BLOOD PRESSURE: 122 MMHG | WEIGHT: 283.4 LBS | TEMPERATURE: 98.6 F | BODY MASS INDEX: 53.51 KG/M2 | DIASTOLIC BLOOD PRESSURE: 70 MMHG

## 2019-12-19 DIAGNOSIS — M25.50 MULTIPLE JOINT PAIN: ICD-10-CM

## 2019-12-19 DIAGNOSIS — R15.9 INCONTINENCE OF FECES, UNSPECIFIED FECAL INCONTINENCE TYPE: ICD-10-CM

## 2019-12-19 DIAGNOSIS — G47.00 INSOMNIA, UNSPECIFIED TYPE: Primary | ICD-10-CM

## 2019-12-19 LAB
ERYTHROCYTE [DISTWIDTH] IN BLOOD BY AUTOMATED COUNT: 14.9 % (ref 10–15)
ERYTHROCYTE [SEDIMENTATION RATE] IN BLOOD BY WESTERGREN METHOD: 30 MM/H (ref 0–30)
HCT VFR BLD AUTO: 37.9 % (ref 35–47)
HGB BLD-MCNC: 11.9 G/DL (ref 11.7–15.7)
MCH RBC QN AUTO: 28.5 PG (ref 26.5–33)
MCHC RBC AUTO-ENTMCNC: 31.4 G/DL (ref 31.5–36.5)
MCV RBC AUTO: 91 FL (ref 78–100)
PLATELET # BLD AUTO: 276 10E9/L (ref 150–450)
RBC # BLD AUTO: 4.17 10E12/L (ref 3.8–5.2)
WBC # BLD AUTO: 7.5 10E9/L (ref 4–11)

## 2019-12-19 PROCEDURE — 85027 COMPLETE CBC AUTOMATED: CPT | Mod: ZL | Performed by: FAMILY MEDICINE

## 2019-12-19 PROCEDURE — 82550 ASSAY OF CK (CPK): CPT | Mod: ZL | Performed by: FAMILY MEDICINE

## 2019-12-19 PROCEDURE — 85652 RBC SED RATE AUTOMATED: CPT | Mod: ZL | Performed by: FAMILY MEDICINE

## 2019-12-19 PROCEDURE — G0463 HOSPITAL OUTPT CLINIC VISIT: HCPCS

## 2019-12-19 PROCEDURE — 86431 RHEUMATOID FACTOR QUANT: CPT | Mod: ZL | Performed by: FAMILY MEDICINE

## 2019-12-19 PROCEDURE — 80053 COMPREHEN METABOLIC PANEL: CPT | Mod: ZL | Performed by: FAMILY MEDICINE

## 2019-12-19 PROCEDURE — 86038 ANTINUCLEAR ANTIBODIES: CPT | Mod: ZL | Performed by: FAMILY MEDICINE

## 2019-12-19 PROCEDURE — 99214 OFFICE O/P EST MOD 30 MIN: CPT | Performed by: FAMILY MEDICINE

## 2019-12-19 PROCEDURE — 36415 COLL VENOUS BLD VENIPUNCTURE: CPT | Mod: ZL | Performed by: FAMILY MEDICINE

## 2019-12-19 PROCEDURE — 86618 LYME DISEASE ANTIBODY: CPT | Mod: ZL | Performed by: FAMILY MEDICINE

## 2019-12-19 PROCEDURE — 86200 CCP ANTIBODY: CPT | Mod: ZL | Performed by: FAMILY MEDICINE

## 2019-12-19 RX ORDER — DOXEPIN 3 MG/1
3 TABLET, FILM COATED ORAL
Qty: 30 TABLET | Refills: 2 | Status: SHIPPED | OUTPATIENT
Start: 2019-12-19

## 2019-12-19 ASSESSMENT — PAIN SCALES - GENERAL: PAINLEVEL: NO PAIN (0)

## 2019-12-19 ASSESSMENT — MIFFLIN-ST. JEOR: SCORE: 1795.12

## 2019-12-19 NOTE — NURSING NOTE
"Chief Complaint   Patient presents with     Sleep Problem       Initial /70 (BP Location: Right arm, Patient Position: Chair, Cuff Size: Adult Regular)   Pulse 94   Temp 98.6  F (37  C) (Tympanic)   Ht 1.545 m (5' 0.83\")   Wt 128.5 kg (283 lb 6.4 oz)   SpO2 96%   BMI 53.85 kg/m   Estimated body mass index is 53.85 kg/m  as calculated from the following:    Height as of this encounter: 1.545 m (5' 0.83\").    Weight as of this encounter: 128.5 kg (283 lb 6.4 oz).  Medication Reconciliation: complete     Analilia Burns LPN    "

## 2019-12-19 NOTE — LETTER
December 26, 2019      Maite REZA [address]  [facility] [address]        Dear ,    We are writing to inform you of your test results.    Testing overall negative, except creatinine kinase being very mildly elevated (usually a marker of muscle inflammation).  We should recheck that marker in about a month or so.     Resulted Orders   CBC with platelets   Result Value Ref Range    WBC 7.5 4.0 - 11.0 10e9/L    RBC Count 4.17 3.8 - 5.2 10e12/L    Hemoglobin 11.9 11.7 - 15.7 g/dL    Hematocrit 37.9 35.0 - 47.0 %    MCV 91 78 - 100 fl    MCH 28.5 26.5 - 33.0 pg    MCHC 31.4 (L) 31.5 - 36.5 g/dL    RDW 14.9 10.0 - 15.0 %    Platelet Count 276 150 - 450 10e9/L   Comprehensive metabolic panel   Result Value Ref Range    Sodium 139 133 - 144 mmol/L    Potassium 4.4 3.4 - 5.3 mmol/L    Chloride 107 94 - 109 mmol/L    Carbon Dioxide 24 20 - 32 mmol/L    Anion Gap 8 3 - 14 mmol/L    Glucose 89 70 - 99 mg/dL      Comment:      Non Fasting    Urea Nitrogen 28 7 - 30 mg/dL    Creatinine 0.97 0.52 - 1.04 mg/dL    GFR Estimate 64 >60 mL/min/[1.73_m2]      Comment:      Non  GFR Calc  Starting 12/18/2018, serum creatinine based estimated GFR (eGFR) will be   calculated using the Chronic Kidney Disease Epidemiology Collaboration   (CKD-EPI) equation.      GFR Estimate If Black 74 >60 mL/min/[1.73_m2]      Comment:       GFR Calc  Starting 12/18/2018, serum creatinine based estimated GFR (eGFR) will be   calculated using the Chronic Kidney Disease Epidemiology Collaboration   (CKD-EPI) equation.      Calcium 8.8 8.5 - 10.1 mg/dL    Bilirubin Total 0.3 0.2 - 1.3 mg/dL    Albumin 4.1 3.4 - 5.0 g/dL    Protein Total 8.1 6.8 - 8.8 g/dL    Alkaline Phosphatase 105 40 - 150 U/L    ALT 39 0 - 50 U/L    AST 26 0 - 45 U/L   CK total   Result Value Ref Range    CK Total 233 (H) 30 - 225 U/L   Rheumatoid factor   Result Value Ref Range    Rheumatoid Factor 11 <12 IU/mL   Lyme Disease Dagmar  with reflex to WB Serum   Result Value Ref Range    Lyme Disease Antibodies Serum 0.18 0.00 - 0.89      Comment:      Negative, Absence of detectable Borrelia burdorferi antibodies. A negative   result does not exclude the possibility of Borrelia burgdorferi infection. If   early Lyme disease is suspected, a second sample should be collected and   tested 2 to 4 weeks later.     Cyclic Citrullinated Peptide Antibody IgG   Result Value Ref Range    Cyclic Citrullinated Peptide Antibody, IgG 1 <7 U/mL      Comment:      Negative   Erythrocyte sedimentation rate auto   Result Value Ref Range    Sed Rate 30 0 - 30 mm/h   Anti Nuclear Dagmar IgG by IFA with Reflex   Result Value Ref Range    ELSIE interpretation Negative NEG^Negative      Comment:                                         Reference range:  <1:40  NEGATIVE  1:40 - 1:80  BORDERLINE POSITIVE  >1:80 POSITIVE         If you have any questions or concerns, please call the clinic at the number listed above.       Sincerely,        Yessi Dorsey MD

## 2019-12-19 NOTE — PROGRESS NOTES
Subjective     Maite Suero is a 59 year old female who presents to clinic today for the following health issues:    HPI   Sleep concerns      Duration: 1-2 month    Description (location/character/radiation): unable to sleep    Intensity:  moderate    Accompanying signs and symptoms: none    History (similar episodes/previous evaluation): None    Precipitating or alleviating factors: None    Therapies tried and outcome: None    Patient had a bad experience with Ambien in the past (she tried to bake in the middle of the night while sleeping).  She notes she slept well for the most part, and she has tried a few different medications since that haven't helped with sleep (trazodone, Rozerem, melatonin, etc).  She would like something that works as well as Ambien that isn't ambien       Musculoskeletal problem/pain      Duration: years    Description  Location: diffuse joint pains, mainly low back and lower extremities    Intensity:  moderate    Accompanying signs and symptoms: unsteady on feet, falls easily    History  Previous similar problem: ongoing problem  Previous evaluation:  MRI    Precipitating or alleviating factors:  Trauma or overuse: no   Aggravating factors include: walking    Therapies tried and outcome: physical therapy and medication      Patient notes continued problems with fecal incontinence.  She would like to see a colorectal surgeon to discuss issues and possible procedures.      Patient Active Problem List   Diagnosis     Benign essential hypertension     Hyperlipidemia     Major depressive disorder, recurrent episode (H)     Ankle sprain     Weight gain     GERD (gastroesophageal reflux disease)     Low back pain     Cervicalgia (NECK)     Urinary, incontinence, stress female     Irritable bowel syndrome     Morbid obesity (H)     Dissociative identity disorder (H)     Vitamin D deficiency     OAB (overactive bladder)     ACP (advance care planning)     Mixed stress and urge urinary  incontinence     Iron deficiency anemia secondary to inadequate dietary iron intake     Past Surgical History:   Procedure Laterality Date     CHOLECYSTECTOMY       COLONOSCOPY N/A 10/9/2014    Procedure: COLONOSCOPY;  Surgeon: Vandana Daniels MD;  Location: [facility]     HYSTERECTOMY TOTAL ABDOMINAL, BILATERAL SALPINGO-OOPHORECTOMY, COMBINED      no cervix; fibroids       Social History     Tobacco Use     Smoking status: Never Smoker     Smokeless tobacco: Never Used   Substance Use Topics     Alcohol use: No     Family History   Problem Relation Age of Onset     Psychotic Disorder Mother      Psychotic Disorder Father      Psychotic Disorder Brother      Psychotic Disorder Sister          Current Outpatient Medications   Medication Sig Dispense Refill     doxepin (SILENOR) 3 MG tablet Take 1 tablet (3 mg) by mouth nightly as needed for sleep 30 tablet 2     buPROPion (WELLBUTRIN XL) 300 MG 24 hr tablet Take 300 mg by mouth daily        Cholecalciferol (VITAMIN D) 2000 UNITS CAPS Take 1 capsule by mouth daily       co-enzyme Q-10 100 MG CAPS capsule Take by mouth daily       Cranberry 1000 MG CAPS        Docusate Sodium (COLACE PO) Take 100 mg by mouth daily 1 tablet daily       ferrous sulfate (FE TABS) 325 (65 Fe) MG EC tablet Take 1 tablet (325 mg) by mouth 2 times daily (Patient taking differently: Take 325 mg by mouth daily ) 60 tablet 2     MYRBETRIQ 50 MG 24 hr tablet TAKE 1 TABLET(50 MG) BY MOUTH DAILY 90 tablet 0     omeprazole (PRILOSEC) 40 MG DR capsule Take 1 capsule (40 mg) by mouth daily 90 capsule 1     rosuvastatin (CRESTOR) 20 MG tablet TAKE 1 TABLET BY MOUTH DAILY 90 tablet 2     sertraline (ZOLOFT) 100 MG tablet Take 2 tablets by mouth daily       vitamin B complex with vitamin C (VITAMIN  B COMPLEX) tablet Take 1 tablet by mouth daily       Allergies   Allergen Reactions     Shellfish Allergy Anaphylaxis     Ambien [Zolpidem] Other (See Comments)     Pt states she was baking food in her sleep      "Pork Allergy      Seasonal      Povidone Iodine Rash         Reviewed and updated as needed this visit by Provider         Review of Systems   ROS COMP: Constitutional, HEENT, cardiovascular, pulmonary, gi and gu systems are negative, except as otherwise noted.      Objective    /70 (BP Location: Right arm, Patient Position: Chair, Cuff Size: Adult Regular)   Pulse 94   Temp 98.6  F (37  C) (Tympanic)   Ht 1.545 m (5' 0.83\")   Wt 128.5 kg (283 lb 6.4 oz)   SpO2 96%   BMI 53.85 kg/m    Body mass index is 53.85 kg/m .  Physical Exam   GENERAL: healthy, alert and no distress  PSYCH: mentation appears normal, affect normal/bright    Diagnostic Test Results:  none         Assessment & Plan     1. Insomnia, unspecified type  Will try doxepin.  We reviewed sleep hygiene.  Follow-up as needed.  - doxepin (SILENOR) 3 MG tablet; Take 1 tablet (3 mg) by mouth nightly as needed for sleep  Dispense: 30 tablet; Refill: 2    2. Multiple joint pain  Labs checked as listed, would consider MRI  - CBC with platelets  - Comprehensive metabolic panel  - CK total  - Rheumatoid factor  - Lyme Disease Dagmar with reflex to WB Serum  - Cyclic Citrullinated Peptide Antibody IgG  - Erythrocyte sedimentation rate auto  - Anti Nuclear Dagmar IgG by IFA with Reflex    3. Incontinence of feces, unspecified fecal incontinence type  Referral ordered.  - COLORECTAL SURGERY REFERRAL     BMI:   Estimated body mass index is 53.85 kg/m  as calculated from the following:    Height as of this encounter: 1.545 m (5' 0.83\").    Weight as of this encounter: 128.5 kg (283 lb 6.4 oz).       Over 30 minutes are spent with the patient, over 20 minutes of which was in education and counseling regarding current conditions and treatment/therapy options/risks/benefits/etc, including medications used and tried for sleep, discussion of joint symptoms, and follow-up planning.      Return in about 3 months (around 3/19/2020) for Follow-up.    Yessi Dorsey" MD [facility]

## 2019-12-20 ENCOUNTER — TELEPHONE (OUTPATIENT)
Dept: FAMILY MEDICINE | Facility: OTHER | Age: 59
End: 2019-12-20

## 2019-12-20 LAB
ALBUMIN SERPL-MCNC: 4.1 G/DL (ref 3.4–5)
ALP SERPL-CCNC: 105 U/L (ref 40–150)
ALT SERPL W P-5'-P-CCNC: 39 U/L (ref 0–50)
ANION GAP SERPL CALCULATED.3IONS-SCNC: 8 MMOL/L (ref 3–14)
AST SERPL W P-5'-P-CCNC: 26 U/L (ref 0–45)
BILIRUB SERPL-MCNC: 0.3 MG/DL (ref 0.2–1.3)
BUN SERPL-MCNC: 28 MG/DL (ref 7–30)
CALCIUM SERPL-MCNC: 8.8 MG/DL (ref 8.5–10.1)
CHLORIDE SERPL-SCNC: 107 MMOL/L (ref 94–109)
CK SERPL-CCNC: 233 U/L (ref 30–225)
CO2 SERPL-SCNC: 24 MMOL/L (ref 20–32)
CREAT SERPL-MCNC: 0.97 MG/DL (ref 0.52–1.04)
GFR SERPL CREATININE-BSD FRML MDRD: 64 ML/MIN/{1.73_M2}
GLUCOSE SERPL-MCNC: 89 MG/DL (ref 70–99)
POTASSIUM SERPL-SCNC: 4.4 MMOL/L (ref 3.4–5.3)
PROT SERPL-MCNC: 8.1 G/DL (ref 6.8–8.8)
SODIUM SERPL-SCNC: 139 MMOL/L (ref 133–144)

## 2019-12-20 NOTE — TELEPHONE ENCOUNTER
Received PA request from [employer]opal Chaves for Silenor 3MG tablets. [facility]. Waiting for response.

## 2019-12-20 NOTE — TELEPHONE ENCOUNTER
APPROVAL received from [employer] for Silenor. Approval dates 12/20/19 through 12/31/20. Pharmacy advised. Documentation scanned to Epic.

## 2019-12-23 LAB
B BURGDOR IGG+IGM SER QL: 0.18 (ref 0–0.89)
CCP AB SER IA-ACNC: 1 U/ML
RHEUMATOID FACT SER NEPH-ACNC: 11 IU/ML (ref 0–20)

## 2019-12-24 LAB — ANA SER QL IF: NEGATIVE

## 2020-04-05 NOTE — TELEPHONE ENCOUNTER
Lasix      Last Written Prescription Date: 09/14/2017  Last Fill Quantity: 90, # refills: 0  Last Office Visit with [facility] or [facility] prescribing provider: 09/08/2017  Next 5 appointments (look out 90 days)     Oct 17, 2017  1:00 PM CDT   (Arrive by 12:45 PM)   Return Visit with Darling Osuna NP   [facility] [place] ([facility] [place] )    [address]  [place] [address]   [phone]            Oct 17, 2017  1:45 PM CDT   (Arrive by 1:30 PM)   Return Visit with Alycia Armenta PA-C   [facility] [place] ([facility] [place] )    [address]  [place] [address]   [phone]                   Potassium   Date Value Ref Range Status   01/05/2017 3.8 3.4 - 5.3 mmol/L Final     Creatinine   Date Value Ref Range Status   01/05/2017 0.78 0.52 - 1.04 mg/dL Final     BP Readings from Last 3 Encounters:   09/12/17 118/74   09/08/17 114/72   03/27/17 119/69         
Not applicable

## 2020-05-04 DIAGNOSIS — N32.81 OAB (OVERACTIVE BLADDER): ICD-10-CM

## 2020-05-04 DIAGNOSIS — N39.46 MIXED INCONTINENCE URGE AND STRESS (MALE)(FEMALE): ICD-10-CM

## 2020-05-04 DIAGNOSIS — E78.2 MIXED HYPERLIPIDEMIA: ICD-10-CM

## 2020-05-04 RX ORDER — ROSUVASTATIN CALCIUM 20 MG/1
20 TABLET, COATED ORAL DAILY
Qty: 90 TABLET | Refills: 0 | Status: SHIPPED | OUTPATIENT
Start: 2020-05-04

## 2020-05-04 RX ORDER — MIRABEGRON 50 MG/1
50 TABLET, EXTENDED RELEASE ORAL DAILY
Qty: 90 TABLET | Refills: 0 | Status: SHIPPED | OUTPATIENT
Start: 2020-05-04

## 2020-05-04 NOTE — TELEPHONE ENCOUNTER
crestor      Last Written Prescription Date:  03/05/2019  Last Fill Quantity: 90,   # refills: 2  Last Office Visit: 12/19/2019  Future Office visit:       [email or web address]      Last Written Prescription Date:  09/24/2019  Last Fill Quantity: 90,   # refills: 0

## 2024-09-04 NOTE — MR AVS SNAPSHOT
After Visit Summary   3/9/2018    Maite Suero    MRN: [id number]           Patient Information     Date Of Birth          1960        Visit Information        Provider Department      3/9/2018 9:00 AM Yessi Dorsey MD [facility]        Today's Diagnoses     Routine general medical examination at a health care facility    -  1    Hyperlipidemia, unspecified hyperlipidemia type        Benign essential hypertension        Pain at surgical incision          Care Instructions        Preventive Health Recommendations  Female Ages 50 - 64    Yearly exam: See your health care provider every year in order to  o Review health changes.   o Discuss preventive care.    o Review your medicines if your doctor has prescribed any.      Get a Pap test every three years (unless you have an abnormal result and your provider advises testing more often).    If you get Pap tests with HPV test, you only need to test every 5 years, unless you have an abnormal result.     You do not need a Pap test if your uterus was removed (hysterectomy) and you have not had cancer.    You should be tested each year for STDs (sexually transmitted diseases) if you're at risk.     Have a mammogram every 1 to 2 years.    Have a colonoscopy at age 50, or have a yearly FIT test (stool test). These exams screen for colon cancer.      Have a cholesterol test every 5 years, or more often if advised.    Have a diabetes test (fasting glucose) every three years. If you are at risk for diabetes, you should have this test more often.     If you are at risk for osteoporosis (brittle bone disease), think about having a bone density scan (DEXA).    Shots: Get a flu shot each year. Get a tetanus shot every 10 years.    Nutrition:     Eat at least 5 servings of fruits and vegetables each day.    Eat whole-grain bread, whole-wheat pasta and brown rice instead of white grains and rice.    Talk to your provider about Calcium and  Vitamin D.     Lifestyle    Exercise at least 150 minutes a week (30 minutes a day, 5 days a week). This will help you control your weight and prevent disease.    Limit alcohol to one drink per day.    No smoking.     Wear sunscreen to prevent skin cancer.     See your dentist every six months for an exam and cleaning.    See your eye doctor every 1 to 2 years.            Follow-ups after your visit        Additional Services     OB/GYN REFERRAL       Your provider has referred you to:  [facility]    Please be aware that coverage of these services is subject to the terms and limitations of your health insurance plan.  Call member services at your health plan with any benefit or coverage questions.      Please bring the following with you to your appointment:    (1) Any X-Rays, CTs or MRIs which have been performed.  Contact the facility where they were done to arrange for  prior to your scheduled appointment.   (2) List of current medications   (3) This referral request   (4) Any documents/labs given to you for this referral                  Follow-up notes from your care team     Return in about 1 year (around 3/9/2019).      Your next 10 appointments already scheduled     Oct 16, 2018  1:00 PM CDT   (Arrive by 12:45 PM)   Hearing Eval with Malick Dawson   [facility] [place] [facility]    [address] [place] [address]   [phone]            Oct 16, 2018  1:30 PM CDT   (Arrive by 1:15 PM)   Return Visit with Darling Osuna NP   [facility] [place] ([facility] [place] )    [address]  [place] [address]   [phone]              Who to contact     If you have questions or need follow up information about today's clinic visit or your schedule please contact [facility] directly at [phone].  Normal or non-critical lab and imaging results will be communicated to you by MyChart, letter or phone within 4 business days  "after the clinic has received the results. If you do not hear from us within 7 days, please contact the clinic through [employer] or phone. If you have a critical or abnormal lab result, we will notify you by phone as soon as possible.  Submit refill requests through [employer] or call your pharmacy and they will forward the refill request to us. Please allow 3 business days for your refill to be completed.          Additional Information About Your Visit        [employer] Information     [employer] lets you send messages to your doctor, view your test results, renew your prescriptions, schedule appointments and more. To sign up, go to [email or web address] . Click on \"Log in\" on the left side of the screen, which will take you to the Welcome page. Then click on \"Sign up Now\" on the right side of the page.     You will be asked to enter the access code listed below, as well as some personal information. Please follow the directions to create your username and password.     Your access code is: [email or web address]  Expires: 2018  1:21 PM     Your access code will  in 90 days. If you need help or a new code, please call your [place] clinic or [phone].        Care EveryWhere ID     This is your Care EveryWhere ID. This could be used by other organizations to access your [place] medical records  [id number]        Your Vitals Were     Pulse Temperature Respirations Height Pulse Oximetry BMI (Body Mass Index)    90 98.1  F (36.7  C) (Tympanic) 18 5' 3\" (1.6 m) 95% 48.18 kg/m2       Blood Pressure from Last 3 Encounters:   18 112/68   17 119/74   10/17/17 112/60    Weight from Last 3 Encounters:   18 272 lb (123.4 kg)   17 277 lb (125.6 kg)   10/17/17 274 lb (124.3 kg)              We Performed the Following     ALT     Basic metabolic panel     Hemoglobin A1c     Lipid Profile     OB/GYN REFERRAL          Today's Medication Changes          These changes are accurate as of 3/9/18  1:21 PM.  If " you have any questions, ask your nurse or doctor.               These medicines have changed or have updated prescriptions.        Dose/Directions    furosemide 20 MG tablet   Commonly known as:  LASIX   This may have changed:  See the new instructions.   Used for:  Edema, unspecified type        TAKE 2 TABLETS BY MOUTH EVERY MORNING AND 1 TABLET BY MOUTH EVERY DAY AT NOON   Quantity:  90 tablet   Refills:  0                Primary Care Provider Office Phone # Fax #    Yessi ADAMES [facility] [phone] [address] [facility] [address]        Equal Access to Services     [facility] : Hadii delmer grey hadasho Soomaali, waaxda luqadaha, qaybta kaalmada adeegyayazan, michael abdi . So [facility] [phone].    ATENCIÓN: Si habla español, tiene a gaviria disposición servicios gratuitos de asistencia lingüística. [facility] [phone].    We comply with applicable federal civil rights laws and [place] laws. We do not discriminate on the basis of race, color, national origin, age, disability, sex, sexual orientation, or gender identity.            Thank you!     Thank you for choosing [facility]  for your care. Our goal is always to provide you with excellent care. Hearing back from our patients is one way we can continue to improve our services. Please take a few minutes to complete the written survey that you may receive in the mail after your visit with us. Thank you!             Your Updated Medication List - Protect others around you: Learn how to safely use, store and throw away your medicines at www.disposemymeds.org.          This list is accurate as of 3/9/18  1:21 PM.  Always use your most recent med list.                   Brand Name Dispense Instructions for use Diagnosis    ABILIFY PO      Take 10 mg by mouth daily        aspirin 81 MG EC tablet   Generic drug:  aspirin      Take 81 mg by mouth daily        buPROPion 300 MG 24 hr tablet     WELLBUTRIN XL     Take 450 mg by mouth daily        COLACE PO      Take 100 mg by mouth daily 2-3 tablets daily        CRESTOR 20 MG tablet   Generic drug:  rosuvastatin     90 tablet    TAKE ONE TABLET BY MOUTH ONCE DAILY    Mixed hyperlipidemia       fluticasone 50 MCG/ACT spray    FLONASE    1 Bottle    Spray 1-2 sprays into both nostrils daily    Chronic rhinitis, unspecified type       furosemide 20 MG tablet    LASIX    90 tablet    TAKE 2 TABLETS BY MOUTH EVERY MORNING AND 1 TABLET BY MOUTH EVERY DAY AT NOON    Edema, unspecified type       LAMICTAL PO           mirabegron 50 MG 24 hr tablet    MYRBETRIQ    90 tablet    Take 1 tablet (50 mg) by mouth daily    Mixed incontinence urge and stress (male)(female), OAB (overactive bladder)       omeprazole 20 MG CR capsule    priLOSEC    60 capsule    TAKE 1 CAPSULE BY MOUTH TWICE DAILY    Gastroesophageal reflux disease without esophagitis       PROBIOTIC DAILY Caps      Take by mouth daily        RITALIN LA 20 MG Cp24   Generic drug:  methylphenidate      Take 80 mg by mouth daily        ROZEREM 8 MG tablet   Generic drug:  ramelteon     30 tablet    TAKE 1 TABLET BY MOUTH EVERY NIGHT AT BEDTIME    Insomnia, unspecified type       sertraline 100 MG tablet    ZOLOFT     Take 2 tablets by mouth daily        vitamin D 2000 UNITS Caps      Take 1 capsule by mouth daily           no

## 2025-01-07 NOTE — PROGRESS NOTES
SUBJECTIVE:                                                    Maite Suero is a 58 year old female who presents to clinic today for the following health issues:        Fall on Friday, Sore tailbone      Duration: since 03/15/19    Description (location/character/radiation): Left flank; low back, hip, tailbone    Intensity:  moderate    Accompanying signs and symptoms: pain and discomfort, bruising, difficulty with mobility    History (similar episodes/previous evaluation): None    Precipitating or alleviating factors: Patient fell; slipped on ice    Therapies tried and outcome: IBU; helpful         Problem list and histories reviewed & adjusted, as indicated.  Additional history: as documented    Patient Active Problem List   Diagnosis     Benign essential hypertension     Hyperlipidemia     Major depressive disorder, recurrent episode (H)     Ankle sprain     Weight gain     GERD (gastroesophageal reflux disease)     Low back pain     Cervicalgia (NECK)     Urinary, incontinence, stress female     Irritable bowel syndrome     Morbid obesity (H)     Dissociative identity disorder (H)     Vitamin D deficiency     OAB (overactive bladder)     ACP (advance care planning)     Mixed stress and urge urinary incontinence     Past Surgical History:   Procedure Laterality Date     CHOLECYSTECTOMY       COLONOSCOPY N/A 10/9/2014    Procedure: COLONOSCOPY;  Surgeon: Vandana Daniels MD;  Location: [facility]     HYSTERECTOMY TOTAL ABDOMINAL, BILATERAL SALPINGO-OOPHORECTOMY, COMBINED      no cervix; fibroids       Social History     Tobacco Use     Smoking status: Never Smoker     Smokeless tobacco: Never Used   Substance Use Topics     Alcohol use: No     Family History   Problem Relation Age of Onset     Psychotic Disorder Mother      Psychotic Disorder Father      Psychotic Disorder Brother      Psychotic Disorder Sister          Current Outpatient Medications   Medication Sig Dispense Refill     buPROPion (WELLBUTRIN XL)  300 MG 24 hr tablet Take 300 mg by mouth daily        Cholecalciferol (VITAMIN D) 2000 UNITS CAPS Take 1 capsule by mouth daily       Docusate Sodium (COLACE PO) Take 100 mg by mouth daily 1 tablet daily       methylphenidate (RITALIN LA) 20 MG CP24 Take 80 mg by mouth daily       mirabegron (MYRBETRIQ) 50 MG 24 hr tablet Take 1 tablet (50 mg) by mouth daily 90 tablet 3     omeprazole (PRILOSEC) 20 MG CR capsule TAKE 1 CAPSULE BY MOUTH TWICE DAILY 60 capsule 11     rosuvastatin (CRESTOR) 20 MG tablet TAKE 1 TABLET BY MOUTH DAILY 90 tablet 2     ROZEREM 8 MG tablet TAKE 1 TABLET BY MOUTH EVERY NIGHT AT BEDTIME 30 tablet 1     sertraline (ZOLOFT) 100 MG tablet Take 2 tablets by mouth daily       Allergies   Allergen Reactions     Shellfish Allergy Anaphylaxis     Ambien [Zolpidem] Other (See Comments)     Pt states she was baking food in her sleep     Pork Allergy      Seasonal      Povidone Iodine Rash       ROS:  Constitutional, HEENT, cardiovascular, pulmonary, gi and gu systems are negative, except as otherwise noted.    OBJECTIVE:     /78 (BP Location: Right arm, Patient Position: Sitting, Cuff Size: Adult Large)   Pulse 79   Temp 97.2  F (36.2  C) (Tympanic)   SpO2 97%   There is no height or weight on file to calculate BMI.  GENERAL: healthy, alert and no distress  PSYCH: mentation appears normal, affect normal/bright    Diagnostic Test Results:  Xray - pelvis - no fractures    ASSESSMENT/PLAN:     1. Pelvic contusion, initial encounter  Ice, rest, symptomatic cares recommended.  Follow-up if no improvement noted.    2. Fall due to slipping on ice or snow, initial encounter  - XR Pelvis 1/2 Views; Future    3. Pain in pelvis  - XR Pelvis 1/2 Views; Future        Yessi Dorsey MD  [facility]       No difficulties